# Patient Record
Sex: FEMALE | Race: WHITE | NOT HISPANIC OR LATINO | Employment: UNEMPLOYED | URBAN - METROPOLITAN AREA
[De-identification: names, ages, dates, MRNs, and addresses within clinical notes are randomized per-mention and may not be internally consistent; named-entity substitution may affect disease eponyms.]

---

## 2017-03-31 ENCOUNTER — HOSPITAL ENCOUNTER (OUTPATIENT)
Dept: FAMILY MEDICINE CLINIC | Facility: CLINIC | Age: 47
Setting detail: SPECIMEN
Discharge: HOME | End: 2017-03-31
Attending: FAMILY MEDICINE | Admitting: FAMILY MEDICINE

## 2017-03-31 LAB
ALBUMIN SERPL-MCNC: 3.9 G/DL (ref 3.5–4.8)
ALBUMIN/GLOB SERPL: 1.2 {RATIO} (ref 1–1.7)
ALP SERPL-CCNC: 55 IU/L (ref 32–91)
ALT SERPL-CCNC: 33 IU/L (ref 14–54)
ANION GAP SERPL CALC-SCNC: 14.3 MMOL/L (ref 10–20)
AST SERPL-CCNC: 25 IU/L (ref 15–41)
BILIRUB SERPL-MCNC: 0.7 MG/DL (ref 0.3–1.2)
BUN SERPL-MCNC: 11 MG/DL (ref 8–20)
BUN/CREAT SERPL: 15.7 (ref 5.4–26.2)
CALCIUM SERPL-MCNC: 9 MG/DL (ref 8.9–10.3)
CHLORIDE SERPL-SCNC: 104 MMOL/L (ref 101–111)
CHOLEST SERPL-MCNC: 216 MG/DL
CHOLEST/HDLC SERPL: 4 {RATIO}
CONV CO2: 25 MMOL/L (ref 22–32)
CONV LDL CHOLESTEROL DIRECT: 146 MG/DL (ref 0–100)
CONV TOTAL PROTEIN: 7.1 G/DL (ref 6.1–7.9)
CREAT UR-MCNC: 0.7 MG/DL (ref 0.4–1)
GLOBULIN UR ELPH-MCNC: 3.2 G/DL (ref 2.5–3.8)
GLUCOSE SERPL-MCNC: 94 MG/DL (ref 65–99)
HDLC SERPL-MCNC: 54 MG/DL
LDLC/HDLC SERPL: 2.7 {RATIO}
LIPID INTERPRETATION: ABNORMAL
POTASSIUM SERPL-SCNC: 4.3 MMOL/L (ref 3.6–5.1)
SODIUM SERPL-SCNC: 139 MMOL/L (ref 136–144)
TRIGL SERPL-MCNC: 85 MG/DL
VLDLC SERPL CALC-MCNC: 16.3 MG/DL

## 2018-10-07 ENCOUNTER — HOSPITAL ENCOUNTER (OUTPATIENT)
Dept: URGENT CARE | Facility: CLINIC | Age: 48
Setting detail: SPECIMEN
Discharge: HOME | End: 2018-10-07
Attending: FAMILY MEDICINE | Admitting: FAMILY MEDICINE

## 2018-10-07 LAB
BACTERIA SPEC AEROBE CULT: NORMAL
COLONY COUNT: NORMAL
Lab: NORMAL
MICRO REPORT STATUS: NORMAL
SPECIMEN SOURCE: NORMAL

## 2019-04-01 ENCOUNTER — HOSPITAL ENCOUNTER (OUTPATIENT)
Dept: FAMILY MEDICINE CLINIC | Facility: CLINIC | Age: 49
Setting detail: SPECIMEN
Discharge: HOME | End: 2019-04-01
Attending: FAMILY MEDICINE | Admitting: FAMILY MEDICINE

## 2019-04-01 LAB
ALBUMIN SERPL-MCNC: 3.9 G/DL (ref 3.5–4.8)
ALBUMIN/GLOB SERPL: 1.3 {RATIO} (ref 1–1.7)
ALP SERPL-CCNC: 69 IU/L (ref 32–91)
ALT SERPL-CCNC: 21 IU/L (ref 14–54)
ANION GAP SERPL CALC-SCNC: 14.8 MMOL/L (ref 10–20)
AST SERPL-CCNC: 24 IU/L (ref 15–41)
BASOPHILS # BLD AUTO: 0.1 10*3/UL (ref 0–0.2)
BASOPHILS NFR BLD AUTO: 1 % (ref 0–2)
BILIRUB SERPL-MCNC: 0.5 MG/DL (ref 0.3–1.2)
BUN SERPL-MCNC: 10 MG/DL (ref 8–20)
BUN/CREAT SERPL: 14.3 (ref 5.4–26.2)
CALCIUM SERPL-MCNC: 9.1 MG/DL (ref 8.9–10.3)
CHLORIDE SERPL-SCNC: 102 MMOL/L (ref 101–111)
CHOLEST SERPL-MCNC: 210 MG/DL
CHOLEST/HDLC SERPL: 4 {RATIO}
CONV CO2: 26 MMOL/L (ref 22–32)
CONV LDL CHOLESTEROL DIRECT: 146 MG/DL (ref 0–100)
CONV TOTAL PROTEIN: 6.8 G/DL (ref 6.1–7.9)
CREAT UR-MCNC: 0.7 MG/DL (ref 0.4–1)
DIFFERENTIAL METHOD BLD: (no result)
EOSINOPHIL # BLD AUTO: 0.3 10*3/UL (ref 0–0.3)
EOSINOPHIL # BLD AUTO: 5 % (ref 0–3)
ERYTHROCYTE [DISTWIDTH] IN BLOOD BY AUTOMATED COUNT: 15.2 % (ref 11.5–14.5)
GLOBULIN UR ELPH-MCNC: 2.9 G/DL (ref 2.5–3.8)
GLUCOSE SERPL-MCNC: 101 MG/DL (ref 65–99)
HCT VFR BLD AUTO: 36.3 % (ref 35–49)
HDLC SERPL-MCNC: 52 MG/DL
HGB BLD-MCNC: 11.8 G/DL (ref 12–15)
LDLC/HDLC SERPL: 2.8 {RATIO}
LIPID INTERPRETATION: ABNORMAL
LYMPHOCYTES # BLD AUTO: 1.5 10*3/UL (ref 0.8–4.8)
LYMPHOCYTES NFR BLD AUTO: 27 % (ref 18–42)
MCH RBC QN AUTO: 27.3 PG (ref 26–32)
MCHC RBC AUTO-ENTMCNC: 32.4 G/DL (ref 32–36)
MCV RBC AUTO: 84.3 FL (ref 80–94)
MONOCYTES # BLD AUTO: 0.5 10*3/UL (ref 0.1–1.3)
MONOCYTES NFR BLD AUTO: 9 % (ref 2–11)
NEUTROPHILS # BLD AUTO: 3.2 10*3/UL (ref 2.3–8.6)
NEUTROPHILS NFR BLD AUTO: 58 % (ref 50–75)
NRBC BLD AUTO-RTO: 0 /100{WBCS}
NRBC/RBC NFR BLD MANUAL: 0 10*3/UL
PLATELET # BLD AUTO: 293 10*3/UL (ref 150–450)
PMV BLD AUTO: 9 FL (ref 7.4–10.4)
POTASSIUM SERPL-SCNC: 3.8 MMOL/L (ref 3.6–5.1)
RBC # BLD AUTO: 4.3 10*6/UL (ref 4–5.4)
SODIUM SERPL-SCNC: 139 MMOL/L (ref 136–144)
TRIGL SERPL-MCNC: 252 MG/DL
VLDLC SERPL CALC-MCNC: 12.1 MG/DL
WBC # BLD AUTO: 5.6 10*3/UL (ref 4.5–11.5)

## 2019-08-05 RX ORDER — LISINOPRIL 10 MG/1
TABLET ORAL
Qty: 30 TABLET | Refills: 0 | Status: SHIPPED | OUTPATIENT
Start: 2019-08-05 | End: 2019-08-31 | Stop reason: SDUPTHER

## 2019-09-01 RX ORDER — LISINOPRIL 10 MG/1
TABLET ORAL
Qty: 30 TABLET | Refills: 3 | Status: SHIPPED | OUTPATIENT
Start: 2019-09-01 | End: 2019-12-22

## 2019-12-22 RX ORDER — LISINOPRIL 10 MG/1
TABLET ORAL
Qty: 30 TABLET | Refills: 3 | Status: SHIPPED | OUTPATIENT
Start: 2019-12-22 | End: 2020-06-29 | Stop reason: SDUPTHER

## 2019-12-31 RX ORDER — METHOCARBAMOL 500 MG/1
500 TABLET, FILM COATED ORAL EVERY 24 HOURS
Qty: 30 TABLET | Refills: 0 | Status: SHIPPED | OUTPATIENT
Start: 2019-12-31 | End: 2020-09-16

## 2020-02-03 ENCOUNTER — LAB (OUTPATIENT)
Dept: LAB | Facility: HOSPITAL | Age: 50
End: 2020-02-03

## 2020-02-03 ENCOUNTER — OFFICE VISIT (OUTPATIENT)
Dept: CARDIOLOGY | Facility: CLINIC | Age: 50
End: 2020-02-03

## 2020-02-03 VITALS
WEIGHT: 176.75 LBS | HEIGHT: 63 IN | DIASTOLIC BLOOD PRESSURE: 89 MMHG | OXYGEN SATURATION: 100 % | BODY MASS INDEX: 31.32 KG/M2 | SYSTOLIC BLOOD PRESSURE: 129 MMHG | HEART RATE: 103 BPM

## 2020-02-03 DIAGNOSIS — I10 ESSENTIAL HYPERTENSION: ICD-10-CM

## 2020-02-03 DIAGNOSIS — R00.2 PALPITATIONS: ICD-10-CM

## 2020-02-03 DIAGNOSIS — E78.5 DYSLIPIDEMIA: ICD-10-CM

## 2020-02-03 DIAGNOSIS — R06.02 SOB (SHORTNESS OF BREATH): ICD-10-CM

## 2020-02-03 DIAGNOSIS — R00.2 PALPITATIONS: Primary | ICD-10-CM

## 2020-02-03 LAB
ALBUMIN SERPL-MCNC: 4.1 G/DL (ref 3.5–5.2)
ALBUMIN/GLOB SERPL: 1.4 G/DL
ALP SERPL-CCNC: 86 U/L (ref 39–117)
ALT SERPL W P-5'-P-CCNC: 22 U/L (ref 1–33)
ANION GAP SERPL CALCULATED.3IONS-SCNC: 12.4 MMOL/L (ref 5–15)
AST SERPL-CCNC: 24 U/L (ref 1–32)
BASOPHILS # BLD AUTO: 0.06 10*3/MM3 (ref 0–0.2)
BASOPHILS NFR BLD AUTO: 1 % (ref 0–1.5)
BILIRUB SERPL-MCNC: <0.2 MG/DL (ref 0.2–1.2)
BUN BLD-MCNC: 15 MG/DL (ref 6–20)
BUN/CREAT SERPL: 22.1 (ref 7–25)
CALCIUM SPEC-SCNC: 9.2 MG/DL (ref 8.6–10.5)
CHLORIDE SERPL-SCNC: 100 MMOL/L (ref 98–107)
CO2 SERPL-SCNC: 25.6 MMOL/L (ref 22–29)
CREAT BLD-MCNC: 0.68 MG/DL (ref 0.57–1)
DEPRECATED RDW RBC AUTO: 41.5 FL (ref 37–54)
EOSINOPHIL # BLD AUTO: 0.16 10*3/MM3 (ref 0–0.4)
EOSINOPHIL NFR BLD AUTO: 2.7 % (ref 0.3–6.2)
ERYTHROCYTE [DISTWIDTH] IN BLOOD BY AUTOMATED COUNT: 14 % (ref 12.3–15.4)
GFR SERPL CREATININE-BSD FRML MDRD: 92 ML/MIN/1.73
GLOBULIN UR ELPH-MCNC: 3 GM/DL
GLUCOSE BLD-MCNC: 110 MG/DL (ref 65–99)
HCT VFR BLD AUTO: 35.3 % (ref 34–46.6)
HGB BLD-MCNC: 11.1 G/DL (ref 12–15.9)
IMM GRANULOCYTES # BLD AUTO: 0.03 10*3/MM3 (ref 0–0.05)
IMM GRANULOCYTES NFR BLD AUTO: 0.5 % (ref 0–0.5)
LYMPHOCYTES # BLD AUTO: 1.83 10*3/MM3 (ref 0.7–3.1)
LYMPHOCYTES NFR BLD AUTO: 30.4 % (ref 19.6–45.3)
MAGNESIUM SERPL-MCNC: 2 MG/DL (ref 1.6–2.6)
MCH RBC QN AUTO: 25.6 PG (ref 26.6–33)
MCHC RBC AUTO-ENTMCNC: 31.4 G/DL (ref 31.5–35.7)
MCV RBC AUTO: 81.5 FL (ref 79–97)
MONOCYTES # BLD AUTO: 0.49 10*3/MM3 (ref 0.1–0.9)
MONOCYTES NFR BLD AUTO: 8.2 % (ref 5–12)
NEUTROPHILS # BLD AUTO: 3.44 10*3/MM3 (ref 1.7–7)
NEUTROPHILS NFR BLD AUTO: 57.2 % (ref 42.7–76)
NRBC BLD AUTO-RTO: 0 /100 WBC (ref 0–0.2)
PLATELET # BLD AUTO: 383 10*3/MM3 (ref 140–450)
PMV BLD AUTO: 10.7 FL (ref 6–12)
POTASSIUM BLD-SCNC: 4 MMOL/L (ref 3.5–5.2)
PROT SERPL-MCNC: 7.1 G/DL (ref 6–8.5)
RBC # BLD AUTO: 4.33 10*6/MM3 (ref 3.77–5.28)
SODIUM BLD-SCNC: 138 MMOL/L (ref 136–145)
TSH SERPL DL<=0.05 MIU/L-ACNC: 1.47 UIU/ML (ref 0.27–4.2)
WBC NRBC COR # BLD: 6.01 10*3/MM3 (ref 3.4–10.8)

## 2020-02-03 PROCEDURE — 36415 COLL VENOUS BLD VENIPUNCTURE: CPT

## 2020-02-03 PROCEDURE — 85025 COMPLETE CBC W/AUTO DIFF WBC: CPT

## 2020-02-03 PROCEDURE — 84443 ASSAY THYROID STIM HORMONE: CPT

## 2020-02-03 PROCEDURE — 93000 ELECTROCARDIOGRAM COMPLETE: CPT | Performed by: INTERNAL MEDICINE

## 2020-02-03 PROCEDURE — 83735 ASSAY OF MAGNESIUM: CPT

## 2020-02-03 PROCEDURE — 99204 OFFICE O/P NEW MOD 45 MIN: CPT | Performed by: INTERNAL MEDICINE

## 2020-02-03 PROCEDURE — 80053 COMPREHEN METABOLIC PANEL: CPT

## 2020-02-03 RX ORDER — OMEPRAZOLE 40 MG/1
CAPSULE, DELAYED RELEASE ORAL
COMMUNITY
Start: 2020-01-13 | End: 2021-08-11

## 2020-02-03 NOTE — PROGRESS NOTES
Encounter Date:02/03/2020  New patient      Patient ID: Itzel Hogan is a 49 y.o. female.    Chief Complaint:  Palpitations  Hypertension  Dyslipidemia  Shortness of breath      History of Present Illness  The patient is a pleasant 49-year-old white female he is here for evaluation of palpitations and shortness of breath.  Patient has history of hypertension and dyslipidemia.  She recently went to urgent care center on 12/30/2019 with an episode of hives.  Patient's blood pressure was elevated.  Since then patient has been having intermittent palpitations and having exertional shortness of breath.  Denies having any chest discomfort heaviness or tightness in the chest.  Denies having any jaw pain left arm pain.  Patient has family history of mitral valve replacement.  No other associated aggravating or alleviating factors.    Patient is not having any chest discomfort dizziness or syncope.  Denies having any headache ,abdominal pain ,nausea, vomiting , diarrhea constipation, loss of weight or loss of appetite.  Denies having any excessive bruising ,hematuria or blood in the stool.    Review of all systems negative except as indicated    Assessment and Plan     ]]]]]]]]]]]]]]]]]  Impression  ========  -Palpitations    -Exertional shortness of breath    -Hypertension dyslipidemia migraine headaches    -Status post tonsillectomy sinus surgery carpal tunnel release    -Family history of coronary artery disease.    -Non-smoker    -Allergic to penicillin cephalexin prochlorperazine  ===========  Plan  ========  Patient has palpitations and exertional shortness of breath.  EKG showed sinus rhythm with small inferior Q waves.  Patient has multiple risk factors.  Ischemic cardiac work-up.  Stress Cardiolite test  Echocardiogram  Patient to have lab work including comprehensive metabolic panel CBC magnesium level and TSH.  Patient may benefit from additional beta-blocker.  Follow-up in the office on the same day.  Further  plan will depend on patient's progress.  [[[[[[[[[[[[[[[[[           Diagnosis Plan   1. Palpitations  Comprehensive Metabolic Panel    CBC & Differential    Magnesium    TSH    Adult Transthoracic Echo Complete W/ Cont if Necessary Per Protocol    Stress Test With Myocardial Perfusion One Day    ECG 12 Lead   2. Essential hypertension  Comprehensive Metabolic Panel    CBC & Differential    Magnesium    TSH    Adult Transthoracic Echo Complete W/ Cont if Necessary Per Protocol    Stress Test With Myocardial Perfusion One Day    ECG 12 Lead   3. Dyslipidemia  Comprehensive Metabolic Panel    CBC & Differential    Magnesium    TSH    Adult Transthoracic Echo Complete W/ Cont if Necessary Per Protocol    Stress Test With Myocardial Perfusion One Day    ECG 12 Lead   4. SOB (shortness of breath)  Comprehensive Metabolic Panel    CBC & Differential    Magnesium    TSH    Adult Transthoracic Echo Complete W/ Cont if Necessary Per Protocol    Stress Test With Myocardial Perfusion One Day    ECG 12 Lead   LAB RESULTS (LAST 7 DAYS)    CBC        BMP        CMP         BNP        TROPONIN        CoAg        Creatinine Clearance  CrCl cannot be calculated (Patient's most recent lab result is older than the maximum 30 days allowed.).    ABG        Radiology  No radiology results for the last day                The following portions of the patient's history were reviewed and updated as appropriate: allergies, current medications, past family history, past medical history, past social history, past surgical history and problem list.    Review of Systems   Constitution: Negative for malaise/fatigue.   Cardiovascular: Positive for chest pain (with exertion). Negative for leg swelling, palpitations and syncope.   Respiratory: Positive for shortness of breath (with exertion).    Skin: Negative for rash.   Gastrointestinal: Negative for nausea and vomiting.   Neurological: Negative for dizziness, light-headedness and numbness.          Current Outpatient Medications:   •  azelastine (ASTELIN) 0.1 % nasal spray, 1 spray into the nostril(s) as directed by provider., Disp: , Rfl:   •  cetirizine (ZYRTEC ALLERGY) 10 MG tablet, Daily., Disp: , Rfl:   •  fluticasone (FLONASE ALLERGY RELIEF) 50 MCG/ACT nasal spray, FLONASE ALLERGY RELIEF 50 MCG/ACT SUSP, Disp: , Rfl:   •  lisinopril (PRINIVIL,ZESTRIL) 10 MG tablet, TAKE 1 TABLET BY MOUTH DAILY, Disp: 30 tablet, Rfl: 3  •  methocarbamol (ROBAXIN) 500 MG tablet, Take 1 tablet by mouth Daily. (Patient taking differently: Take 500 mg by mouth As Needed.), Disp: 30 tablet, Rfl: 0  •  montelukast (SINGULAIR) 10 MG tablet, Take 10 mg by mouth Daily., Disp: , Rfl:   •  Multiple Vitamins-Minerals (MULTIVITAMIN WITH MINERALS) tablet tablet, Take 1 tablet by mouth Daily., Disp: , Rfl:   •  omeprazole (priLOSEC) 40 MG capsule, TK 1 C PO QD, Disp: , Rfl:   •  predniSONE (DELTASONE) 10 MG (21) tablet pack, Take  by mouth Daily. Use as directed on package, Disp: 21 each, Rfl: 0    Allergies   Allergen Reactions   • Prochlorperazine Dizziness   • Cephalexin Hives   • Penicillins Hives       Family History   Problem Relation Age of Onset   • Stroke Mother    • Heart failure Mother    • Cancer Mother    • Skin cancer Father        Past Surgical History:   Procedure Laterality Date   • CARPAL TUNNEL RELEASE Bilateral    • SINUS SURGERY     • TONSILLECTOMY     • WISDOM TOOTH EXTRACTION         Past Medical History:   Diagnosis Date   • Allergies    • Hyperlipidemia    • Hypertension    • Migraine        Family History   Problem Relation Age of Onset   • Stroke Mother    • Heart failure Mother    • Cancer Mother    • Skin cancer Father        Social History     Socioeconomic History   • Marital status:      Spouse name: Not on file   • Number of children: Not on file   • Years of education: Not on file   • Highest education level: Not on file   Tobacco Use   • Smoking status: Never Smoker   • Smokeless  "tobacco: Never Used   Substance and Sexual Activity   • Alcohol use: Yes     Comment: occasionally           ECG 12 Lead  Date/Time: 2/3/2020 11:11 AM  Performed by: Oleg Flores MD  Authorized by: Oleg Flores MD   Previous ECG: no previous ECG available  Comments: Normal sinus rhythm nonspecific ST-T wave changes 90/min small inferior Q waves normal axis normal intervals no ectopy no previous tracing available for comparison              Objective:       Physical Exam    /89 (BP Location: Left arm, Patient Position: Sitting, Cuff Size: Adult)   Pulse 103   Ht 160 cm (63\")   Wt 80.2 kg (176 lb 12 oz)   LMP 01/05/2020   SpO2 100%   BMI 31.31 kg/m²   The patient is alert, oriented and in no distress.    Vital signs as noted above.    Head and neck revealed no carotid bruits or jugular venous distension.  No thyromegaly or lymphadenopathy is present.    Lungs clear.  No wheezing.  Breath sounds are normal bilaterally.    Heart normal first and second heart sounds.  No murmur..  No pericardial rub is present.  No gallop is present.    Abdomen soft and nontender.  No organomegaly is present.    Extremities revealed good peripheral pulses without any pedal edema.    Skin warm and dry.    Musculoskeletal system is grossly normal.    CNS grossly normal.        "

## 2020-02-11 ENCOUNTER — HOSPITAL ENCOUNTER (OUTPATIENT)
Dept: CARDIOLOGY | Facility: HOSPITAL | Age: 50
Discharge: HOME | End: 2020-02-11

## 2020-02-11 ENCOUNTER — HOSPITAL ENCOUNTER (OUTPATIENT)
Dept: CARDIOLOGY | Facility: HOSPITAL | Age: 50
Discharge: HOME | End: 2020-02-11
Admitting: INTERNAL MEDICINE

## 2020-02-11 ENCOUNTER — OFFICE VISIT (OUTPATIENT)
Dept: CARDIOLOGY | Facility: CLINIC | Age: 50
End: 2020-02-11

## 2020-02-11 VITALS
WEIGHT: 176 LBS | DIASTOLIC BLOOD PRESSURE: 62 MMHG | HEIGHT: 63 IN | SYSTOLIC BLOOD PRESSURE: 99 MMHG | BODY MASS INDEX: 31.18 KG/M2

## 2020-02-11 VITALS
DIASTOLIC BLOOD PRESSURE: 80 MMHG | WEIGHT: 176 LBS | HEART RATE: 89 BPM | HEIGHT: 63 IN | SYSTOLIC BLOOD PRESSURE: 130 MMHG | BODY MASS INDEX: 31.18 KG/M2

## 2020-02-11 DIAGNOSIS — R06.02 SOB (SHORTNESS OF BREATH): ICD-10-CM

## 2020-02-11 DIAGNOSIS — E78.5 DYSLIPIDEMIA: ICD-10-CM

## 2020-02-11 DIAGNOSIS — I10 ESSENTIAL HYPERTENSION: ICD-10-CM

## 2020-02-11 DIAGNOSIS — R00.2 PALPITATIONS: ICD-10-CM

## 2020-02-11 DIAGNOSIS — I10 ESSENTIAL HYPERTENSION: Primary | ICD-10-CM

## 2020-02-11 LAB
BH CV ECHO MEAS - ACS: 2 CM
BH CV ECHO MEAS - AO MAX PG (FULL): 0.85 MMHG
BH CV ECHO MEAS - AO MAX PG: 5.7 MMHG
BH CV ECHO MEAS - AO MEAN PG (FULL): 1 MMHG
BH CV ECHO MEAS - AO MEAN PG: 3.6 MMHG
BH CV ECHO MEAS - AO ROOT AREA: 5.2 CM^2
BH CV ECHO MEAS - AO ROOT DIAM: 2.6 CM
BH CV ECHO MEAS - AO V2 MAX: 118.9 CM/SEC
BH CV ECHO MEAS - AO V2 MEAN: 92.1 CM/SEC
BH CV ECHO MEAS - AO V2 VTI: 26.1 CM
BH CV ECHO MEAS - ASC AORTA: 2.7 CM
BH CV ECHO MEAS - AVA(I,A): 2 CM^2
BH CV ECHO MEAS - AVA(I,D): 2 CM^2
BH CV ECHO MEAS - AVA(V,A): 2.1 CM^2
BH CV ECHO MEAS - AVA(V,D): 2.1 CM^2
BH CV ECHO MEAS - EDV(CUBED): 71.4 ML
BH CV ECHO MEAS - EDV(MOD-SP4): 77.6 ML
BH CV ECHO MEAS - EDV(TEICH): 76.3 ML
BH CV ECHO MEAS - EF(CUBED): 70.6 %
BH CV ECHO MEAS - EF(MOD-SP4): 69.7 %
BH CV ECHO MEAS - EF(TEICH): 62.7 %
BH CV ECHO MEAS - ESV(CUBED): 21 ML
BH CV ECHO MEAS - ESV(MOD-SP4): 23.5 ML
BH CV ECHO MEAS - ESV(TEICH): 28.5 ML
BH CV ECHO MEAS - FS: 33.5 %
BH CV ECHO MEAS - IVS/LVPW: 0.75
BH CV ECHO MEAS - IVSD: 0.8 CM
BH CV ECHO MEAS - LA DIMENSION: 3.9 CM
BH CV ECHO MEAS - LA/AO: 1.5
BH CV ECHO MEAS - LV MASS(C)D: 123.3 GRAMS
BH CV ECHO MEAS - LV MAX PG: 4.8 MMHG
BH CV ECHO MEAS - LV MEAN PG: 2.6 MMHG
BH CV ECHO MEAS - LV V1 MAX: 109.6 CM/SEC
BH CV ECHO MEAS - LV V1 MEAN: 77.1 CM/SEC
BH CV ECHO MEAS - LV V1 VTI: 22.2 CM
BH CV ECHO MEAS - LVIDD: 4.1 CM
BH CV ECHO MEAS - LVIDS: 2.8 CM
BH CV ECHO MEAS - LVOT AREA: 2.3 CM^2
BH CV ECHO MEAS - LVOT DIAM: 1.7 CM
BH CV ECHO MEAS - LVPWD: 1.1 CM
BH CV ECHO MEAS - MV A MAX VEL: 63.2 CM/SEC
BH CV ECHO MEAS - MV DEC SLOPE: 303.5 CM/SEC^2
BH CV ECHO MEAS - MV DEC TIME: 0.2 SEC
BH CV ECHO MEAS - MV E MAX VEL: 61.6 CM/SEC
BH CV ECHO MEAS - MV E/A: 0.97
BH CV ECHO MEAS - MV MAX PG: 3.8 MMHG
BH CV ECHO MEAS - MV MEAN PG: 1.9 MMHG
BH CV ECHO MEAS - MV V2 MAX: 97.7 CM/SEC
BH CV ECHO MEAS - MV V2 MEAN: 67 CM/SEC
BH CV ECHO MEAS - MV V2 VTI: 12.7 CM
BH CV ECHO MEAS - MVA(VTI): 4.1 CM^2
BH CV ECHO MEAS - PA ACC TIME: 0.15 SEC
BH CV ECHO MEAS - PA PR(ACCEL): 9.4 MMHG
BH CV ECHO MEAS - PULM DIAS VEL: 43.8 CM/SEC
BH CV ECHO MEAS - PULM S/D: 1.3
BH CV ECHO MEAS - PULM SYS VEL: 58.4 CM/SEC
BH CV ECHO MEAS - RAP SYSTOLE: 3 MMHG
BH CV ECHO MEAS - RV MAX PG: 3.6 MMHG
BH CV ECHO MEAS - RV MEAN PG: 1.9 MMHG
BH CV ECHO MEAS - RV V1 MAX: 95.3 CM/SEC
BH CV ECHO MEAS - RV V1 MEAN: 64.2 CM/SEC
BH CV ECHO MEAS - RV V1 VTI: 19.4 CM
BH CV ECHO MEAS - RVDD: 1.8 CM
BH CV ECHO MEAS - RVSP: 20.9 MMHG
BH CV ECHO MEAS - SV(AO): 135.3 ML
BH CV ECHO MEAS - SV(CUBED): 50.4 ML
BH CV ECHO MEAS - SV(LVOT): 51.7 ML
BH CV ECHO MEAS - SV(MOD-SP4): 54.1 ML
BH CV ECHO MEAS - SV(TEICH): 47.8 ML
BH CV ECHO MEAS - TR MAX VEL: 211.3 CM/SEC
BH CV STRESS BP STAGE 1: NORMAL
BH CV STRESS BP STAGE 2: NORMAL
BH CV STRESS DURATION MIN STAGE 1: 3
BH CV STRESS DURATION MIN STAGE 2: 3
BH CV STRESS DURATION SEC STAGE 1: 0
BH CV STRESS DURATION SEC STAGE 2: 0
BH CV STRESS GRADE STAGE 1: 10
BH CV STRESS GRADE STAGE 2: 12
BH CV STRESS HR STAGE 1: 130
BH CV STRESS HR STAGE 2: 144
BH CV STRESS METS STAGE 1: 5
BH CV STRESS METS STAGE 2: 7.5
BH CV STRESS PROTOCOL 1: NORMAL
BH CV STRESS RECOVERY BP: NORMAL MMHG
BH CV STRESS RECOVERY HR: 120 BPM
BH CV STRESS SPEED STAGE 1: 1.7
BH CV STRESS SPEED STAGE 2: 2.5
BH CV STRESS STAGE 1: 1
BH CV STRESS STAGE 2: 2
LV EF 2D ECHO EST: 60 %
MAXIMAL PREDICTED HEART RATE: 172 BPM
MAXIMAL PREDICTED HEART RATE: 172 BPM
PERCENT MAX PREDICTED HR: 90.7 %
STRESS BASELINE BP: NORMAL MMHG
STRESS BASELINE HR: 87 BPM
STRESS PERCENT HR: 107 %
STRESS POST EXERCISE DUR MIN: 8 MIN
STRESS POST EXERCISE DUR SEC: 50 SEC
STRESS POST PEAK HR: 156 BPM
STRESS TARGET HR: 146 BPM
STRESS TARGET HR: 146 BPM

## 2020-02-11 PROCEDURE — 93018 CV STRESS TEST I&R ONLY: CPT | Performed by: INTERNAL MEDICINE

## 2020-02-11 PROCEDURE — 0 TECHNETIUM SESTAMIBI: Performed by: INTERNAL MEDICINE

## 2020-02-11 PROCEDURE — A9500 TC99M SESTAMIBI: HCPCS | Performed by: INTERNAL MEDICINE

## 2020-02-11 PROCEDURE — 99213 OFFICE O/P EST LOW 20 MIN: CPT | Performed by: INTERNAL MEDICINE

## 2020-02-11 PROCEDURE — 78452 HT MUSCLE IMAGE SPECT MULT: CPT | Performed by: INTERNAL MEDICINE

## 2020-02-11 PROCEDURE — 93016 CV STRESS TEST SUPVJ ONLY: CPT | Performed by: INTERNAL MEDICINE

## 2020-02-11 PROCEDURE — 78452 HT MUSCLE IMAGE SPECT MULT: CPT

## 2020-02-11 PROCEDURE — 93017 CV STRESS TEST TRACING ONLY: CPT

## 2020-02-11 PROCEDURE — 93306 TTE W/DOPPLER COMPLETE: CPT

## 2020-02-11 PROCEDURE — 93306 TTE W/DOPPLER COMPLETE: CPT | Performed by: INTERNAL MEDICINE

## 2020-02-11 RX ADMIN — TECHNETIUM TC 99M SESTAMIBI 1 DOSE: 1 INJECTION INTRAVENOUS at 13:30

## 2020-02-11 RX ADMIN — TECHNETIUM TC 99M SESTAMIBI 1 DOSE: 1 INJECTION INTRAVENOUS at 15:15

## 2020-02-11 NOTE — PROGRESS NOTES
Encounter Date:02/11/2020  Last seen 2/3/2020      Patient ID: Itzel Hogan is a 49 y.o. female.    Chief Complaint:    Palpitations  Hypertension  Dyslipidemia  Shortness of breath        History of Present Illness  The patient is a pleasant 49-year-old white female he is here for evaluation of palpitations and shortness of breath.  Patient has history of hypertension and dyslipidemia.  She recently went to urgent care center on 12/30/2019 with an episode of hives.  Patient's blood pressure was elevated.  Since then patient has been having intermittent palpitations and having exertional shortness of breath.  Denies having any chest discomfort heaviness or tightness in the chest.  Denies having any jaw pain left arm pain.  Patient has family history of mitral valve replacement.  No other associated aggravating or alleviating factors.     Patient is not having any chest discomfort dizziness or syncope.  Denies having any headache ,abdominal pain ,nausea, vomiting , diarrhea constipation, loss of weight or loss of appetite.  Denies having any excessive bruising ,hematuria or blood in the stool.     Review of all systems negative except as indicated     Assessment and Plan      ]]]]]]]]]]]]]]]]]  Impression  ========  -Palpitations     -Exertional shortness of breath  Echocardiogram-normal 2/11/2020  Stress Cardiolite test-normal 2/11/2020     -Hypertension dyslipidemia migraine headaches     -Status post tonsillectomy sinus surgery carpal tunnel release     -Family history of coronary artery disease.     -Non-smoker     -Allergic to penicillin cephalexin prochlorperazine  ===========  Plan  ========  Patient has palpitations and exertional shortness of breath.  EKG showed sinus rhythm with small inferior Q waves.  Patient has multiple risk factors.  Ischemic cardiac work-up.  Stress Cardiolite test-normal as above  Echocardiogram-normal as above  Patient to have lab work including comprehensive metabolic panel CBC  magnesium level and TSH.-  Reviewed and normal  Patient may benefit from additional beta-blocker.  Follow-up in the office in 6 weeks.  Consider cardiac catheterization if patient has continued symptoms  Further plan will depend on patient's progress.  [[[[[[[[[[[[[[[[[           Diagnosis Plan   1. Essential hypertension     2. Palpitations     3. Dyslipidemia     LAB RESULTS (LAST 7 DAYS)    CBC        BMP        CMP         BNP        TROPONIN        CoAg        Creatinine Clearance  Estimated Creatinine Clearance: 101.3 mL/min (by C-G formula based on SCr of 0.68 mg/dL).    ABG        Radiology  No radiology results for the last day                The following portions of the patient's history were reviewed and updated as appropriate: allergies, current medications, past family history, past medical history, past social history, past surgical history and problem list.    Review of Systems   Constitution: Negative for fever and malaise/fatigue.   Cardiovascular: Negative for chest pain, dyspnea on exertion and palpitations.   Respiratory: Negative for cough and shortness of breath.    Skin: Negative for rash.   Gastrointestinal: Negative for abdominal pain, nausea and vomiting.   Neurological: Negative for focal weakness and headaches.   All other systems reviewed and are negative.        Current Outpatient Medications:   •  azelastine (ASTELIN) 0.1 % nasal spray, 1 spray into the nostril(s) as directed by provider., Disp: , Rfl:   •  cetirizine (ZYRTEC ALLERGY) 10 MG tablet, Daily., Disp: , Rfl:   •  fluticasone (FLONASE ALLERGY RELIEF) 50 MCG/ACT nasal spray, FLONASE ALLERGY RELIEF 50 MCG/ACT SUSP, Disp: , Rfl:   •  lisinopril (PRINIVIL,ZESTRIL) 10 MG tablet, TAKE 1 TABLET BY MOUTH DAILY, Disp: 30 tablet, Rfl: 3  •  methocarbamol (ROBAXIN) 500 MG tablet, Take 1 tablet by mouth Daily. (Patient taking differently: Take 500 mg by mouth As Needed.), Disp: 30 tablet, Rfl: 0  •  montelukast (SINGULAIR) 10 MG tablet,  "Take 10 mg by mouth Daily., Disp: , Rfl:   •  Multiple Vitamins-Minerals (MULTIVITAMIN WITH MINERALS) tablet tablet, Take 1 tablet by mouth Daily., Disp: , Rfl:   •  omeprazole (priLOSEC) 40 MG capsule, TK 1 C PO QD, Disp: , Rfl:   •  predniSONE (DELTASONE) 10 MG (21) tablet pack, Take  by mouth Daily. Use as directed on package, Disp: 21 each, Rfl: 0  No current facility-administered medications for this visit.     Allergies   Allergen Reactions   • Prochlorperazine Dizziness   • Cephalexin Hives   • Penicillins Hives       Family History   Problem Relation Age of Onset   • Stroke Mother    • Heart failure Mother    • Cancer Mother    • Skin cancer Father        Past Surgical History:   Procedure Laterality Date   • CARPAL TUNNEL RELEASE Bilateral    • SINUS SURGERY     • TONSILLECTOMY     • WISDOM TOOTH EXTRACTION         Past Medical History:   Diagnosis Date   • Allergies    • Hyperlipidemia    • Hypertension    • Migraine        Family History   Problem Relation Age of Onset   • Stroke Mother    • Heart failure Mother    • Cancer Mother    • Skin cancer Father        Social History     Socioeconomic History   • Marital status:      Spouse name: Not on file   • Number of children: Not on file   • Years of education: Not on file   • Highest education level: Not on file   Tobacco Use   • Smoking status: Never Smoker   • Smokeless tobacco: Never Used   Substance and Sexual Activity   • Alcohol use: Yes     Comment: occasionally         Procedures      Objective:       Physical Exam    /80   Pulse 89   Ht 160 cm (63\")   Wt 79.8 kg (176 lb)   BMI 31.18 kg/m²   The patient is alert, oriented and in no distress.    Vital signs as noted above.    Head and neck revealed no carotid bruits or jugular venous distension.  No thyromegaly or lymphadenopathy is present.    Lungs clear.  No wheezing.  Breath sounds are normal bilaterally.    Heart normal first and second heart sounds.  No murmur..  No " pericardial rub is present.  No gallop is present.    Abdomen soft and nontender.  No organomegaly is present.    Extremities revealed good peripheral pulses without any pedal edema.    Skin warm and dry.    Musculoskeletal system is grossly normal.    CNS grossly normal.

## 2020-04-29 RX ORDER — MONTELUKAST SODIUM 10 MG/1
TABLET ORAL
Qty: 90 TABLET | Refills: 0 | Status: SHIPPED | OUTPATIENT
Start: 2020-04-29 | End: 2020-07-24

## 2020-06-29 ENCOUNTER — OFFICE VISIT (OUTPATIENT)
Dept: CARDIOLOGY | Facility: CLINIC | Age: 50
End: 2020-06-29

## 2020-06-29 VITALS
BODY MASS INDEX: 30.65 KG/M2 | HEART RATE: 86 BPM | HEIGHT: 63 IN | OXYGEN SATURATION: 100 % | DIASTOLIC BLOOD PRESSURE: 83 MMHG | WEIGHT: 173 LBS | SYSTOLIC BLOOD PRESSURE: 120 MMHG

## 2020-06-29 DIAGNOSIS — R06.02 SOB (SHORTNESS OF BREATH): ICD-10-CM

## 2020-06-29 DIAGNOSIS — I10 ESSENTIAL HYPERTENSION: Primary | ICD-10-CM

## 2020-06-29 DIAGNOSIS — E78.5 DYSLIPIDEMIA: ICD-10-CM

## 2020-06-29 DIAGNOSIS — R00.2 PALPITATIONS: ICD-10-CM

## 2020-06-29 PROCEDURE — 93000 ELECTROCARDIOGRAM COMPLETE: CPT | Performed by: INTERNAL MEDICINE

## 2020-06-29 PROCEDURE — 99213 OFFICE O/P EST LOW 20 MIN: CPT | Performed by: INTERNAL MEDICINE

## 2020-06-29 RX ORDER — LISINOPRIL 10 MG/1
10 TABLET ORAL DAILY
Qty: 30 TABLET | Refills: 3 | Status: SHIPPED | OUTPATIENT
Start: 2020-06-29 | End: 2022-03-08 | Stop reason: SDUPTHER

## 2020-06-29 NOTE — PROGRESS NOTES
Encounter Date:06/29/2020  Last seen 2/11/2020      Patient ID: Itzel Hogan is a 49 y.o. female.    Chief Complaint:  Palpitations  Hypertension  Dyslipidemia  Shortness of breath        History of Present Illness  Since I have last seen, the patient has been without any chest discomfort ,shortness of breath, palpitations, dizziness or syncope.  Denies having any headache ,abdominal pain ,nausea, vomiting , diarrhea constipation, loss of weight or loss of appetite.  Denies having any excessive bruising ,hematuria or blood in the stool.    Review of all systems negative except as indicated    Assessment and Plan      ]]]]]]]]]]]]]]]]]  Impression  ========  -Palpitations-better     -Exertional shortness of breath-better    Echocardiogram-normal 2/11/2020  Stress Cardiolite test-normal 2/11/2020     -Hypertension dyslipidemia migraine headaches recent cholesterol 233  (March 2020)     -Status post tonsillectomy sinus surgery carpal tunnel release     -Family history of coronary artery disease.     -Non-smoker     -Allergic to penicillin cephalexin prochlorperazine  ===========  Plan  ========  Palpitations and shortness of breath better.  Patient is not having any angina pectoris or congestive heart failure  Recent labs were reviewed.  Cholesterol 233 .  Low-cholesterol low-fat diet.  Follow-up with primary care regarding elevated cholesterol.  EKG showed sinus rhythm with small inferior Q waves.  Recent lab work including comprehensive metabolic panel CBC magnesium level and TSH.-  Reviewed and normal  Follow-up in the office in 6 months.  Further plan will depend on patient's progress.  [[[[[[[[[[[[[[[[[              Diagnosis Plan   1. Essential hypertension     2. Palpitations     3. Dyslipidemia     4. SOB (shortness of breath)     LAB RESULTS (LAST 7 DAYS)    CBC        BMP        CMP         BNP        TROPONIN        CoAg        Creatinine Clearance  CrCl cannot be calculated (Patient's most  recent lab result is older than the maximum 30 days allowed.).    ABG        Radiology  No radiology results for the last day                The following portions of the patient's history were reviewed and updated as appropriate: allergies, current medications, past family history, past medical history, past social history, past surgical history and problem list.    Review of Systems   Constitution: Negative for malaise/fatigue.   Cardiovascular: Negative for chest pain, leg swelling, palpitations and syncope.   Respiratory: Negative for shortness of breath.    Skin: Negative for rash.   Gastrointestinal: Negative for nausea and vomiting.   Neurological: Negative for dizziness, light-headedness and numbness.         Current Outpatient Medications:   •  azelastine (ASTELIN) 0.1 % nasal spray, 1 spray into the nostril(s) as directed by provider., Disp: , Rfl:   •  cetirizine (ZYRTEC ALLERGY) 10 MG tablet, Daily., Disp: , Rfl:   •  fluticasone (FLONASE ALLERGY RELIEF) 50 MCG/ACT nasal spray, FLONASE ALLERGY RELIEF 50 MCG/ACT SUSP, Disp: , Rfl:   •  lisinopril (PRINIVIL,ZESTRIL) 10 MG tablet, Take 1 tablet by mouth Daily., Disp: 30 tablet, Rfl: 3  •  methocarbamol (ROBAXIN) 500 MG tablet, Take 1 tablet by mouth Daily. (Patient taking differently: Take 500 mg by mouth As Needed.), Disp: 30 tablet, Rfl: 0  •  montelukast (SINGULAIR) 10 MG tablet, TAKE 1 TABLET BY MOUTH EVERY DAY, Disp: 90 tablet, Rfl: 0  •  Multiple Vitamins-Minerals (MULTIVITAMIN WITH MINERALS) tablet tablet, Take 1 tablet by mouth Daily., Disp: , Rfl:   •  omeprazole (priLOSEC) 40 MG capsule, TK 1 C PO QD, Disp: , Rfl:   •  predniSONE (DELTASONE) 10 MG (21) tablet pack, Take  by mouth Daily. Use as directed on package, Disp: 21 each, Rfl: 0    Allergies   Allergen Reactions   • Prochlorperazine Dizziness   • Cephalexin Hives   • Penicillins Hives       Family History   Problem Relation Age of Onset   • Stroke Mother    • Heart failure Mother    •  "Cancer Mother    • Skin cancer Father        Past Surgical History:   Procedure Laterality Date   • CARPAL TUNNEL RELEASE Bilateral    • SINUS SURGERY     • TONSILLECTOMY     • WISDOM TOOTH EXTRACTION         Past Medical History:   Diagnosis Date   • Allergies    • Hyperlipidemia    • Hypertension    • Migraine        Family History   Problem Relation Age of Onset   • Stroke Mother    • Heart failure Mother    • Cancer Mother    • Skin cancer Father        Social History     Socioeconomic History   • Marital status:      Spouse name: Not on file   • Number of children: Not on file   • Years of education: Not on file   • Highest education level: Not on file   Tobacco Use   • Smoking status: Never Smoker   • Smokeless tobacco: Never Used   Substance and Sexual Activity   • Alcohol use: Yes     Comment: occasionally           ECG 12 Lead  Date/Time: 6/29/2020 1:51 PM  Performed by: Oleg Flores MD  Authorized by: Oleg Flores MD   Comparison: compared with previous ECG   Similar to previous ECG  Comments: Normal sinus rhythm nonspecific ST-T wave changes 88/min normal axis normal intervals no ectopy no change from previous EKG              Objective:       Physical Exam    /83 (BP Location: Left arm, Patient Position: Sitting, Cuff Size: Large Adult)   Pulse 86   Ht 160 cm (63\")   Wt 78.5 kg (173 lb)   SpO2 100%   BMI 30.65 kg/m²   The patient is alert, oriented and in no distress.    Vital signs as noted above.    Head and neck revealed no carotid bruits or jugular venous distension.  No thyromegaly or lymphadenopathy is present.    Lungs clear.  No wheezing.  Breath sounds are normal bilaterally.    Heart normal first and second heart sounds.  No murmur..  No pericardial rub is present.  No gallop is present.    Abdomen soft and nontender.  No organomegaly is present.    Extremities revealed good peripheral pulses without any pedal edema.    Skin warm and dry.    Musculoskeletal system is " grossly normal.    CNS grossly normal.

## 2020-07-24 RX ORDER — MONTELUKAST SODIUM 10 MG/1
TABLET ORAL
Qty: 90 TABLET | Refills: 0 | Status: SHIPPED | OUTPATIENT
Start: 2020-07-24 | End: 2020-09-16

## 2020-09-14 ENCOUNTER — HOSPITAL ENCOUNTER (EMERGENCY)
Facility: HOSPITAL | Age: 50
Discharge: HOME | End: 2020-09-14
Admitting: EMERGENCY MEDICINE

## 2020-09-14 VITALS
DIASTOLIC BLOOD PRESSURE: 73 MMHG | RESPIRATION RATE: 14 BRPM | HEART RATE: 92 BPM | TEMPERATURE: 98.2 F | OXYGEN SATURATION: 100 % | HEIGHT: 63 IN | BODY MASS INDEX: 30.78 KG/M2 | SYSTOLIC BLOOD PRESSURE: 123 MMHG | WEIGHT: 173.72 LBS

## 2020-09-14 DIAGNOSIS — R51.9 ACUTE INTRACTABLE HEADACHE, UNSPECIFIED HEADACHE TYPE: Primary | ICD-10-CM

## 2020-09-14 PROCEDURE — 25010000002 KETOROLAC TROMETHAMINE PER 15 MG: Performed by: PHYSICIAN ASSISTANT

## 2020-09-14 PROCEDURE — 96372 THER/PROPH/DIAG INJ SC/IM: CPT

## 2020-09-14 PROCEDURE — 99283 EMERGENCY DEPT VISIT LOW MDM: CPT

## 2020-09-14 PROCEDURE — 96375 TX/PRO/DX INJ NEW DRUG ADDON: CPT

## 2020-09-14 PROCEDURE — 25010000002 DIPHENHYDRAMINE PER 50 MG: Performed by: PHYSICIAN ASSISTANT

## 2020-09-14 PROCEDURE — 96374 THER/PROPH/DIAG INJ IV PUSH: CPT

## 2020-09-14 PROCEDURE — 25010000002 ONDANSETRON PER 1 MG: Performed by: PHYSICIAN ASSISTANT

## 2020-09-14 PROCEDURE — 25010000002 MORPHINE PER 10 MG: Performed by: EMERGENCY MEDICINE

## 2020-09-14 RX ORDER — SODIUM CHLORIDE 0.9 % (FLUSH) 0.9 %
10 SYRINGE (ML) INJECTION AS NEEDED
Status: DISCONTINUED | OUTPATIENT
Start: 2020-09-14 | End: 2020-09-14 | Stop reason: HOSPADM

## 2020-09-14 RX ORDER — MORPHINE SULFATE 4 MG/ML
2 INJECTION, SOLUTION INTRAMUSCULAR; INTRAVENOUS ONCE
Status: COMPLETED | OUTPATIENT
Start: 2020-09-14 | End: 2020-09-14

## 2020-09-14 RX ORDER — DIPHENHYDRAMINE HYDROCHLORIDE 50 MG/ML
25 INJECTION INTRAMUSCULAR; INTRAVENOUS ONCE
Status: COMPLETED | OUTPATIENT
Start: 2020-09-14 | End: 2020-09-14

## 2020-09-14 RX ORDER — PROCHLORPERAZINE EDISYLATE 5 MG/ML
10 INJECTION INTRAMUSCULAR; INTRAVENOUS ONCE
Status: DISCONTINUED | OUTPATIENT
Start: 2020-09-14 | End: 2020-09-14

## 2020-09-14 RX ORDER — KETOROLAC TROMETHAMINE 15 MG/ML
15 INJECTION, SOLUTION INTRAMUSCULAR; INTRAVENOUS ONCE
Status: COMPLETED | OUTPATIENT
Start: 2020-09-14 | End: 2020-09-14

## 2020-09-14 RX ORDER — SUMATRIPTAN 6 MG/.5ML
6 INJECTION, SOLUTION SUBCUTANEOUS ONCE
Status: COMPLETED | OUTPATIENT
Start: 2020-09-14 | End: 2020-09-14

## 2020-09-14 RX ORDER — ONDANSETRON 2 MG/ML
4 INJECTION INTRAMUSCULAR; INTRAVENOUS ONCE
Status: COMPLETED | OUTPATIENT
Start: 2020-09-14 | End: 2020-09-14

## 2020-09-14 RX ADMIN — SUMATRIPTAN 6 MG: 6 INJECTION SUBCUTANEOUS at 09:54

## 2020-09-14 RX ADMIN — MORPHINE SULFATE 2 MG: 4 INJECTION INTRAVENOUS at 11:03

## 2020-09-14 RX ADMIN — SODIUM CHLORIDE 500 ML: 900 INJECTION, SOLUTION INTRAVENOUS at 09:54

## 2020-09-14 RX ADMIN — DIPHENHYDRAMINE HYDROCHLORIDE 25 MG: 50 INJECTION, SOLUTION INTRAMUSCULAR; INTRAVENOUS at 09:54

## 2020-09-14 RX ADMIN — KETOROLAC TROMETHAMINE 15 MG: 15 INJECTION, SOLUTION INTRAMUSCULAR; INTRAVENOUS at 09:53

## 2020-09-14 RX ADMIN — ONDANSETRON 4 MG: 2 INJECTION INTRAMUSCULAR; INTRAVENOUS at 09:53

## 2020-09-14 NOTE — ED PROVIDER NOTES
Subjective   History: Patient is a 49-year-old female presents to the ER with a headache since Tuesday.  She reports she has a history of migraines this feels like a typical migraine but she has not been able to shake it.  She is been taking ibuprofen and Tylenol without significant relief.  She reports it is on the left side of her head and her left eye her left scalp she reports is worse when she bends over.  She had some associated blurry vision and flashes in her left eye that comes and goes along with photophobia.  She does not currently have a PCP.  No sudden onset or thunderclap onset.  It is not the worst headache of her life.      Onset: 6 days  Location: Head  Duration: Constant  Character: Pressure  Aggravating/Alleviating factors: None  Radiation none  Severity: moderate            Review of Systems   Constitutional: Negative for chills, diaphoresis, fatigue and fever.   Eyes: Positive for visual disturbance.   Respiratory: Negative for cough, choking and shortness of breath.    Cardiovascular: Negative for chest pain and palpitations.   Gastrointestinal: Negative for abdominal pain, nausea and vomiting.   Genitourinary: Negative.    Musculoskeletal: Negative.    Skin: Negative.    Neurological: Positive for headaches.   Psychiatric/Behavioral: Negative.        Past Medical History:   Diagnosis Date   • Allergies    • Hyperlipidemia    • Hypertension    • Migraine        Allergies   Allergen Reactions   • Penicillins Hives   • Prochlorperazine Dizziness   • Stadol [Butorphanol] Dizziness   • Cephalexin Hives       Past Surgical History:   Procedure Laterality Date   • CARPAL TUNNEL RELEASE Bilateral    • SINUS SURGERY     • TONSILLECTOMY     • WISDOM TOOTH EXTRACTION         Family History   Problem Relation Age of Onset   • Stroke Mother    • Heart failure Mother    • Cancer Mother    • Skin cancer Father        Social History     Socioeconomic History   • Marital status:      Spouse name: Not on  file   • Number of children: Not on file   • Years of education: Not on file   • Highest education level: Not on file   Tobacco Use   • Smoking status: Never Smoker   • Smokeless tobacco: Never Used   Substance and Sexual Activity   • Alcohol use: Yes     Comment: occasionally           Objective   Physical Exam  Constitutional:       General: She is not in acute distress.     Appearance: Normal appearance. She is well-developed. She is toxic-appearing. She is not ill-appearing or diaphoretic.   HENT:      Head: Normocephalic and atraumatic.   Eyes:      Extraocular Movements: Extraocular movements intact.   Neck:      Musculoskeletal: Normal range of motion and neck supple. No muscular tenderness.   Cardiovascular:      Rate and Rhythm: Normal rate and regular rhythm.   Pulmonary:      Effort: Pulmonary effort is normal.      Breath sounds: Normal breath sounds.   Musculoskeletal: Normal range of motion.         General: No swelling or tenderness.   Skin:     General: Skin is warm and dry.   Neurological:      General: No focal deficit present.      Mental Status: She is alert and oriented to person, place, and time. Mental status is at baseline.      Sensory: No sensory deficit.      Motor: No weakness.   Psychiatric:         Mood and Affect: Mood normal.         Behavior: Behavior normal.         Thought Content: Thought content normal.         Judgment: Judgment normal.         Procedures           ED Course          No radiology results for the last day  Labs Reviewed - No data to display  Medications   sodium chloride 0.9 % flush 10 mL (has no administration in time range)   Morphine sulfate (PF) injection 2 mg (has no administration in time range)   sodium chloride 0.9 % bolus 500 mL (500 mL Intravenous New Bag 9/14/20 3796)   SUMAtriptan (IMITREX) injection 6 mg (6 mg Subcutaneous Given 9/14/20 2019)   ketorolac (TORADOL) injection 15 mg (15 mg Intravenous Given 9/14/20 9853)   diphenhydrAMINE (BENADRYL)  injection 25 mg (25 mg Intravenous Given 9/14/20 1566)   ondansetron (ZOFRAN) injection 4 mg (4 mg Intravenous Given 9/14/20 9504)                                       MDM  Number of Diagnoses or Management Options  Acute intractable headache, unspecified headache type:   Diagnosis management comments: I examined the patient using the appropriate personal protective equipment.      DISPOSITION:   Chart Review:  Comorbidity:  has a past medical history of Allergies, Hyperlipidemia, Hypertension, and Migraine.  Differentials:this list is not all inclusive and does not constitute the entirety of considered causes --> migraine intracranial abnormality temporal arteritis    Imaging: Was interpreted by physician and reviewed by myself:  No radiology results for the last day    Disposition/Treatment:    Patient is a 49-year-old female history of migraines who presents to the ER with a migraine for the last 6 days.  She is been taking over-the-counter medication without relief she received a migraine cocktail and somewhat dull pressure she received a dose of pain medication told to follow-up with PCP this week for further management for migraine medication.  No thunderclap onset not the worst headache of her life.  She was stable at time of discharge return precaution follow-up terms provided she was in agreement with plan    Patient Progress  Patient progress: stable      Final diagnoses:   Acute intractable headache, unspecified headache type            Dolorse Prado PA-C  09/14/20 1046

## 2020-09-14 NOTE — DISCHARGE INSTRUCTIONS
Return to the ER for any worsening symptoms follow-up with your primary care provider if you do not have PCP see the above referrals.

## 2020-09-14 NOTE — ED NOTES
C/O migraine headache that has been present x six days. History of migraines.       Jojo Herrera RN  09/14/20 0939

## 2020-09-16 ENCOUNTER — HOSPITAL ENCOUNTER (OUTPATIENT)
Dept: GENERAL RADIOLOGY | Facility: HOSPITAL | Age: 50
Discharge: HOME | End: 2020-09-16
Admitting: NURSE PRACTITIONER

## 2020-09-16 ENCOUNTER — OFFICE VISIT (OUTPATIENT)
Dept: FAMILY MEDICINE CLINIC | Facility: CLINIC | Age: 50
End: 2020-09-16

## 2020-09-16 VITALS
HEART RATE: 83 BPM | WEIGHT: 174 LBS | HEIGHT: 63 IN | TEMPERATURE: 98 F | DIASTOLIC BLOOD PRESSURE: 78 MMHG | OXYGEN SATURATION: 100 % | BODY MASS INDEX: 30.83 KG/M2 | SYSTOLIC BLOOD PRESSURE: 116 MMHG

## 2020-09-16 DIAGNOSIS — M50.30 DDD (DEGENERATIVE DISC DISEASE), CERVICAL: ICD-10-CM

## 2020-09-16 DIAGNOSIS — M50.30 DDD (DEGENERATIVE DISC DISEASE), CERVICAL: Primary | ICD-10-CM

## 2020-09-16 DIAGNOSIS — G44.209 TENSION-TYPE HEADACHE, NOT INTRACTABLE, UNSPECIFIED CHRONICITY PATTERN: ICD-10-CM

## 2020-09-16 PROCEDURE — 99213 OFFICE O/P EST LOW 20 MIN: CPT | Performed by: NURSE PRACTITIONER

## 2020-09-16 PROCEDURE — 72040 X-RAY EXAM NECK SPINE 2-3 VW: CPT

## 2020-09-16 RX ORDER — MELATONIN
1000 DAILY
COMMUNITY
End: 2022-09-09

## 2020-09-16 RX ORDER — ASCORBIC ACID 500 MG
500 TABLET ORAL DAILY
COMMUNITY
End: 2022-09-09

## 2020-09-16 RX ORDER — TIZANIDINE HYDROCHLORIDE 4 MG/1
4 CAPSULE, GELATIN COATED ORAL 3 TIMES DAILY PRN
Qty: 30 CAPSULE | Refills: 2 | Status: SHIPPED | OUTPATIENT
Start: 2020-09-16 | End: 2020-12-07 | Stop reason: ALTCHOICE

## 2020-09-16 NOTE — PROGRESS NOTES
Subjective   Itzel Hogan is a 49 y.o. female.       HPI   Pt. is here today for ER follow up from 2 days ago for headache and neck pain. She was given pain relievers which she says  dulled the headache but it has not completely gone away.    Has DDD in neck; has done PT in the past. Thinks because her neck and neck muscles are painful that this is triggering her headache.  She has been trying to do the stretches she learned at PT at home but they have not been helping.  She is concerned the DDD is worsened.  She denies any numbness or tingling.  Says she uses Robaxin at home but doesn't feel like it works.  She has taken otc pain relievers without relief.    She denies any CP; palpitations; SOA; dizziness; headache; trouble with vision.    The following portions of the patient's history were reviewed and updated as appropriate: allergies, current medications, past family history, past medical history, past social history, past surgical history and problem list.    Review of Systems   Constitutional: Negative for activity change, appetite change, chills, diaphoresis, fatigue, fever, unexpected weight gain and unexpected weight loss.   HENT: Positive for rhinorrhea and sinus pressure. Negative for congestion, ear discharge, ear pain, nosebleeds, postnasal drip, sneezing, sore throat, tinnitus and trouble swallowing.    Eyes: Negative for blurred vision, double vision, photophobia and visual disturbance.   Respiratory: Negative for cough, chest tightness, shortness of breath and wheezing.    Cardiovascular: Negative for chest pain, palpitations and leg swelling.   Gastrointestinal: Negative for abdominal distention, abdominal pain, constipation, diarrhea, nausea, vomiting and indigestion.   Musculoskeletal: Positive for myalgias and neck pain. Negative for arthralgias and neck stiffness.   Skin: Negative for rash and skin lesions.   Neurological: Positive for headache. Negative for dizziness, tremors, syncope,  weakness, light-headedness, memory problem and confusion.   Psychiatric/Behavioral: Negative for depressed mood. The patient is not nervous/anxious.        Objective   Physical Exam  Vitals signs reviewed.   Constitutional:       General: She is not in acute distress.     Appearance: Normal appearance.   HENT:      Head: Normocephalic and atraumatic.      Right Ear: Hearing, tympanic membrane, ear canal and external ear normal.      Left Ear: Hearing, tympanic membrane, ear canal and external ear normal.      Nose: Nose normal.      Right Sinus: No maxillary sinus tenderness or frontal sinus tenderness.      Left Sinus: No maxillary sinus tenderness or frontal sinus tenderness.      Mouth/Throat:      Lips: Pink.      Mouth: Mucous membranes are moist.      Tongue: No lesions.      Pharynx: Oropharynx is clear. No oropharyngeal exudate.   Eyes:      General:         Right eye: No discharge.         Left eye: No discharge.      Conjunctiva/sclera: Conjunctivae normal.   Neck:      Musculoskeletal: Normal range of motion and neck supple. Muscular tenderness present. No neck rigidity.      Vascular: No carotid bruit.   Cardiovascular:      Rate and Rhythm: Normal rate and regular rhythm.      Pulses: Normal pulses.      Heart sounds: Normal heart sounds. No murmur.   Pulmonary:      Effort: Pulmonary effort is normal. No respiratory distress.      Breath sounds: Normal breath sounds. No wheezing.   Chest:      Chest wall: No tenderness.   Abdominal:      General: Abdomen is flat. Bowel sounds are normal. There is no distension.      Tenderness: There is no abdominal tenderness.   Musculoskeletal:      Cervical back: She exhibits tenderness, bony tenderness and spasm. She exhibits normal range of motion, no swelling, no edema and no deformity.   Lymphadenopathy:      Cervical: No cervical adenopathy.   Skin:     General: Skin is warm and dry.      Capillary Refill: Capillary refill takes less than 2 seconds.    Neurological:      General: No focal deficit present.      Mental Status: She is alert and oriented to person, place, and time.      Cranial Nerves: No cranial nerve deficit.   Psychiatric:         Mood and Affect: Mood normal.           Assessment/Plan   Itzel was seen today for hospital follow up visit, migraine, neck pain and sinusitis.    Diagnoses and all orders for this visit:    DDD (degenerative disc disease), cervical  Comments:  Will get updated xray of c-spine.   Tizanidine given PRN.   Heat/ice and rest; gentle stretches.   Orders:  -     XR Spine Cervical 2 or 3 View; Future  -     TiZANidine (ZANAFLEX) 4 MG capsule; Take 1 capsule by mouth 3 (Three) Times a Day As Needed for Muscle Spasms.    Tension-type headache, not intractable, unspecified chronicity pattern  Comments:  Will get updated xray of c-spine.   Tizanidine given PRN.   Can use Tylenol or Ibuprofen as needed.   Heat/ice and rest; gentle stretches.   Orders:  -     TiZANidine (ZANAFLEX) 4 MG capsule; Take 1 capsule by mouth 3 (Three) Times a Day As Needed for Muscle Spasms.

## 2020-12-07 ENCOUNTER — OFFICE VISIT (OUTPATIENT)
Dept: NEUROSURGERY | Facility: CLINIC | Age: 50
End: 2020-12-07

## 2020-12-07 VITALS
WEIGHT: 172 LBS | HEART RATE: 103 BPM | SYSTOLIC BLOOD PRESSURE: 127 MMHG | BODY MASS INDEX: 30.48 KG/M2 | DIASTOLIC BLOOD PRESSURE: 89 MMHG | HEIGHT: 63 IN

## 2020-12-07 DIAGNOSIS — M54.2 PAIN OF CERVICAL FACET JOINT: Primary | ICD-10-CM

## 2020-12-07 DIAGNOSIS — M50.30 DDD (DEGENERATIVE DISC DISEASE), CERVICAL: ICD-10-CM

## 2020-12-07 PROCEDURE — 96372 THER/PROPH/DIAG INJ SC/IM: CPT | Performed by: NURSE PRACTITIONER

## 2020-12-07 PROCEDURE — 99214 OFFICE O/P EST MOD 30 MIN: CPT | Performed by: NURSE PRACTITIONER

## 2020-12-07 RX ORDER — MELOXICAM 15 MG/1
15 TABLET ORAL NIGHTLY
Qty: 60 TABLET | Refills: 1 | Status: SHIPPED | OUTPATIENT
Start: 2020-12-07 | End: 2021-01-25

## 2020-12-07 RX ORDER — FERROUS SULFATE 325(65) MG
1 TABLET ORAL 2 TIMES DAILY
COMMUNITY
Start: 2020-10-14 | End: 2022-09-09

## 2020-12-07 RX ORDER — METHYLPREDNISOLONE ACETATE 80 MG/ML
80 INJECTION, SUSPENSION INTRA-ARTICULAR; INTRALESIONAL; INTRAMUSCULAR; SOFT TISSUE ONCE
Status: COMPLETED | OUTPATIENT
Start: 2020-12-07 | End: 2020-12-07

## 2020-12-07 RX ORDER — METHYLPREDNISOLONE 4 MG/1
TABLET ORAL
Qty: 1 EACH | Refills: 0 | Status: SHIPPED | OUTPATIENT
Start: 2020-12-07 | End: 2021-01-25

## 2020-12-07 RX ORDER — CYCLOBENZAPRINE HCL 10 MG
10 TABLET ORAL NIGHTLY
Qty: 60 TABLET | Refills: 0 | Status: SHIPPED | OUTPATIENT
Start: 2020-12-07 | End: 2022-03-08

## 2020-12-07 RX ORDER — ALBUTEROL SULFATE 90 UG/1
AEROSOL, METERED RESPIRATORY (INHALATION)
COMMUNITY
Start: 2020-11-13 | End: 2021-01-25

## 2020-12-07 RX ADMIN — METHYLPREDNISOLONE ACETATE 80 MG: 80 INJECTION, SUSPENSION INTRA-ARTICULAR; INTRALESIONAL; INTRAMUSCULAR; SOFT TISSUE at 11:27

## 2020-12-07 NOTE — PROGRESS NOTES
Subjective   History of Present Illness: Itzel Hogan is a 49 y.o. female been seen in the office today for neck pain.  Patient is new to clinic.  She states that she has been experiencing progressive cervical pain down her neck and upper thoracic area.  She also states she has a history of some lower lumbar pain as well.  Patient states that the pain in her neck and shoulder area is located on both sides of her neck.  She states that this pain is worsened upon increased activity when she works around the house.  She characterizes the pain as cramping muscle pain along the sides of her neck and down across her shoulders.  Also states that she did some tightening up the sides of her neck into her head causing headaches at times.  Patient describes no radiculopathy pain down her arms or numbness or tingling.  He denies any bowel/bladder dysfunction, leg weakness, arm pain, arm weakness, balance issues, dizziness.              Neck Pain   This is a chronic problem. The current episode started more than 1 year ago. The problem occurs constantly. The problem has been gradually worsening. The pain is associated with nothing. The pain is present in the left side and right side. The quality of the pain is described as aching. The pain is moderate. The symptoms are aggravated by stress. The pain is worse during the day. Associated symptoms include headaches. Pertinent negatives include no leg pain, numbness, pain with swallowing, photophobia, tingling, trouble swallowing or weakness. She has tried acetaminophen, muscle relaxants and NSAIDs for the symptoms. The treatment provided mild relief.       The following portions of the patient's history were reviewed and updated as appropriate: allergies, current medications, past family history, past medical history, past social history, past surgical history and problem list.    Review of Systems   HENT: Negative for trouble swallowing.    Eyes: Negative for photophobia.  "  Cardiovascular: Negative for leg swelling.   Genitourinary: Negative for difficulty urinating.   Musculoskeletal: Positive for back pain, neck pain and neck stiffness. Negative for gait problem.   Neurological: Positive for headaches. Negative for dizziness, tingling, weakness and numbness.   All other systems reviewed and are negative.      Objective     ./89 (BP Location: Left arm, Patient Position: Sitting, Cuff Size: Adult)   Pulse 103   Ht 160 cm (63\")   Wt 78 kg (172 lb)   BMI 30.47 kg/m²    Body mass index is 30.47 kg/m².      Physical Exam  Neurological:      Mental Status: She is oriented to person, place, and time.      Coordination: Finger-Nose-Finger Test and Romberg Test normal.      Gait: Gait is intact.      Deep Tendon Reflexes: Strength normal.   Psychiatric:         Speech: Speech normal.       Neurologic Exam     Mental Status   Oriented to person, place, and time.   Speech: speech is normal   Level of consciousness: alert    Cranial Nerves     CN XI   Right sternocleidomastoid strength: normal  Left sternocleidomastoid strength: normal  Right trapezius strength: normal  Left trapezius strength: normal    Motor Exam   Muscle bulk: normal  Overall muscle tone: normal    Strength   Strength 5/5 throughout.     Sensory Exam   Light touch normal.     Gait, Coordination, and Reflexes     Gait  Gait: normal    Coordination   Romberg: negative  Finger to nose coordination: normal    Reflexes   Reflexes 2+ except as noted.   Right plantar: normal  Left plantar: normal  Right Singh: absent  Left Singh: absent  Right ankle clonus: absent  Left ankle clonus: absent    Cervical spine  +/-  palpable masses, trachea midline, no lesions, no deformity  positive TTP midline, paraspinal   Decreased ROM     50 degrees right rotation   60 left rotation  Negative Spurling sign  Negative Lhermitte's sign  Muscle tightness along trapezius bilaterally  Muscle tightness paraspinal cervical  Thoracic  + TTP "  paraspinal  - deformity    Lumbar   + paraspinal  - deformity, masses, lesions  Normal ROM,     Assessment/Plan   Independent Review of Radiographic Studies:      I personally reviewed the images from the following studies.    Cervical x-rays 9/16/2020    1.  Multilevel DDD greatest at the C6-C7  2.  Grade 1 anterior listhesis of C7 on T1 of approximately 3 mm felt stable from previous imaging.    3.  Multilevel severe facet arthropathy    Medical Decision Making:    Patient is felt to be suffering from facet mediated pain along her cervical spine and into her thoracic spine.  She has no radiculopathy.  Does have severe facet arthropathy.  Upon examination, patient had tenderness bilaterally along the sides of her spine and muscles were very tight.  We will start her on a course of Flexeril, Mobic and sent her for physical therapy.  Patient wants all conservative measures completed.  She will also be given a Depo-Medrol injection today as well as on started on course of methyl prednisone Dosepak to reduce inflammation and pain.        Diagnoses and all orders for this visit:    1. Pain of cervical facet joint (Primary)  -     Ambulatory Referral to Physical Therapy Evaluate and treat; Soft Tissue Mobilizaton; Stretching, ROM, Strengthening  -     methylPREDNISolone acetate (DEPO-medrol) injection 80 mg    2. DDD (degenerative disc disease), cervical  -     Ambulatory Referral to Physical Therapy Evaluate and treat; Soft Tissue Mobilizaton; Stretching, ROM, Strengthening  -     methylPREDNISolone acetate (DEPO-medrol) injection 80 mg    Other orders  -     methylPREDNISolone (MEDROL) 4 MG dose pack; Take as directed on package instructions.  Dispense: 1 each; Refill: 0  -     meloxicam (MOBIC) 15 MG tablet; Take 1 tablet by mouth Every Night.  Dispense: 60 tablet; Refill: 1  -     cyclobenzaprine (FLEXERIL) 10 MG tablet; Take 1 tablet by mouth Every Night.  Dispense: 60 tablet; Refill: 0      Return in about 6 weeks  (around 1/18/2021).    This patient was examined wearing appropriate personal protective equipment.        Denea Church, HAVEN  12/07/20  08:41 EST

## 2021-01-12 ENCOUNTER — OFFICE VISIT (OUTPATIENT)
Dept: CARDIOLOGY | Facility: CLINIC | Age: 51
End: 2021-01-12

## 2021-01-12 VITALS
HEIGHT: 63 IN | BODY MASS INDEX: 30.39 KG/M2 | DIASTOLIC BLOOD PRESSURE: 82 MMHG | WEIGHT: 171.5 LBS | SYSTOLIC BLOOD PRESSURE: 119 MMHG | OXYGEN SATURATION: 99 % | TEMPERATURE: 97.8 F | HEART RATE: 78 BPM

## 2021-01-12 DIAGNOSIS — R06.02 SOB (SHORTNESS OF BREATH): Primary | ICD-10-CM

## 2021-01-12 DIAGNOSIS — I10 ESSENTIAL HYPERTENSION: ICD-10-CM

## 2021-01-12 DIAGNOSIS — R00.2 PALPITATIONS: ICD-10-CM

## 2021-01-12 DIAGNOSIS — E78.5 DYSLIPIDEMIA: ICD-10-CM

## 2021-01-12 PROCEDURE — 99214 OFFICE O/P EST MOD 30 MIN: CPT | Performed by: INTERNAL MEDICINE

## 2021-01-12 RX ORDER — VITAMIN B COMPLEX
CAPSULE ORAL DAILY
COMMUNITY
End: 2022-09-09

## 2021-01-12 NOTE — PROGRESS NOTES
Encounter Date:01/12/2021  Last seen 6/29/2020      Patient ID: Itzel Hogan is a 50 y.o. female.    Chief Complaint:    Palpitations  Hypertension  Dyslipidemia  Shortness of breath        History of Present Illness  Since I have last seen, the patient has been without any chest discomfort ,shortness of breath, palpitations, dizziness or syncope.  Denies having any headache ,abdominal pain ,nausea, vomiting , diarrhea constipation, loss of weight or loss of appetite.  Denies having any excessive bruising ,hematuria or blood in the stool.    Review of all systems negative except as indicated.    Reviewed ROS.  Assessment and Plan      ]]]]]]]]]]]]]]]]]  Impression  ========  -Palpitations-better     -Exertional shortness of breath-better     Echocardiogram-normal 2/11/2020  Stress Cardiolite test-normal 2/11/2020     -Hypertension dyslipidemia migraine headaches recent cholesterol 233  (March 2020)     -Status post tonsillectomy sinus surgery carpal tunnel release     -Family history of coronary artery disease.     -Non-smoker     -Allergic to penicillin cephalexin prochlorperazine  ===========  Plan  ========  Palpitations-better.    Shortness of breath better.    Hypertension-well-controlled-119/82  Continue present medications.    Dyslipidemia-diet controlled  Patient is not having any angina pectoris or congestive heart failure  Recent labs were reviewed.  Cholesterol 233 .  Low-cholesterol low-fat diet.  Recent lab work including comprehensive metabolic panel CBC magnesium level and TSH.-  Reviewed and normal    Follow-up in the office in 6 months.    Further plan will depend on patient's progress.  [[[[[[[[[[[[[[[[[             Diagnosis Plan   1. SOB (shortness of breath)     2. Palpitations     3. Essential hypertension     4. Dyslipidemia     LAB RESULTS (LAST 7 DAYS)    CBC        BMP        CMP         BNP        TROPONIN        CoAg        Creatinine Clearance  CrCl cannot be calculated  (Patient's most recent lab result is older than the maximum 30 days allowed.).    ABG        Radiology  No radiology results for the last day                The following portions of the patient's history were reviewed and updated as appropriate: allergies, current medications, past family history, past medical history, past social history, past surgical history and problem list.    Review of Systems   Constitution: Negative for malaise/fatigue.   Cardiovascular: Negative for chest pain, leg swelling, palpitations and syncope.   Respiratory: Negative for shortness of breath.    Skin: Negative for rash.   Gastrointestinal: Negative for nausea and vomiting.   Neurological: Negative for dizziness, light-headedness and numbness.         Current Outpatient Medications:   •  albuterol sulfate  (90 Base) MCG/ACT inhaler, , Disp: , Rfl:   •  azelastine (ASTELIN) 0.1 % nasal spray, 1 spray into the nostril(s) as directed by provider., Disp: , Rfl:   •  B Complex Vitamins (vitamin b complex) capsule capsule, Take  by mouth Daily., Disp: , Rfl:   •  cetirizine (ZYRTEC ALLERGY) 10 MG tablet, Daily., Disp: , Rfl:   •  cholecalciferol (VITAMIN D3) 25 MCG (1000 UT) tablet, Take 1,000 Units by mouth Daily., Disp: , Rfl:   •  cyclobenzaprine (FLEXERIL) 10 MG tablet, Take 1 tablet by mouth Every Night., Disp: 60 tablet, Rfl: 0  •  ferrous sulfate 325 (65 FE) MG tablet, Take 1 tablet by mouth 2 (Two) Times a Day., Disp: , Rfl:   •  fluticasone (FLONASE ALLERGY RELIEF) 50 MCG/ACT nasal spray, FLONASE ALLERGY RELIEF 50 MCG/ACT SUSP, Disp: , Rfl:   •  lisinopril (PRINIVIL,ZESTRIL) 10 MG tablet, Take 1 tablet by mouth Daily., Disp: 30 tablet, Rfl: 3  •  meloxicam (MOBIC) 15 MG tablet, Take 1 tablet by mouth Every Night., Disp: 60 tablet, Rfl: 1  •  methylPREDNISolone (MEDROL) 4 MG dose pack, Take as directed on package instructions., Disp: 1 each, Rfl: 0  •  omeprazole (priLOSEC) 40 MG capsule, TK 1 C PO QD, Disp: , Rfl:   •   "vitamin C (ASCORBIC ACID) 500 MG tablet, Take 500 mg by mouth Daily., Disp: , Rfl:   •  Zinc 50 MG capsule, Take  by mouth., Disp: , Rfl:     Allergies   Allergen Reactions   • Penicillins Hives   • Prochlorperazine Dizziness   • Stadol [Butorphanol] Dizziness   • Cephalexin Hives       Family History   Problem Relation Age of Onset   • Stroke Mother    • Heart failure Mother    • Cancer Mother    • Skin cancer Father        Past Surgical History:   Procedure Laterality Date   • CARPAL TUNNEL RELEASE Bilateral    • SINUS SURGERY     • TONSILLECTOMY     • WISDOM TOOTH EXTRACTION         Past Medical History:   Diagnosis Date   • Allergies    • Anemia    • B12 deficiency    • Hyperlipidemia    • Hypertension    • Migraine        Family History   Problem Relation Age of Onset   • Stroke Mother    • Heart failure Mother    • Cancer Mother    • Skin cancer Father        Social History     Socioeconomic History   • Marital status:      Spouse name: Not on file   • Number of children: Not on file   • Years of education: Not on file   • Highest education level: Not on file   Tobacco Use   • Smoking status: Never Smoker   • Smokeless tobacco: Never Used   Substance and Sexual Activity   • Alcohol use: Yes     Comment: occasionally   • Drug use: Never   • Sexual activity: Defer         Procedures      Objective:       Physical Exam    /82   Pulse 78   Temp 97.8 °F (36.6 °C)   Ht 160 cm (63\")   Wt 77.8 kg (171 lb 8 oz)   SpO2 99%   BMI 30.38 kg/m²   The patient is alert, oriented and in no distress.    Vital signs as noted above.    Head and neck revealed no carotid bruits or jugular venous distension.  No thyromegaly or lymphadenopathy is present.    Lungs clear.  No wheezing.  Breath sounds are normal bilaterally.    Heart normal first and second heart sounds.  No murmur..  No pericardial rub is present.  No gallop is present.    Abdomen soft and nontender.  No organomegaly is present.    Extremities " revealed good peripheral pulses without any pedal edema.    Skin warm and dry.    Musculoskeletal system is grossly normal.    CNS grossly normal.    Reviewed and unchanged from last visit.

## 2021-01-25 ENCOUNTER — OFFICE VISIT (OUTPATIENT)
Dept: FAMILY MEDICINE CLINIC | Facility: CLINIC | Age: 51
End: 2021-01-25

## 2021-01-25 VITALS
BODY MASS INDEX: 31.54 KG/M2 | HEIGHT: 63 IN | OXYGEN SATURATION: 98 % | DIASTOLIC BLOOD PRESSURE: 88 MMHG | WEIGHT: 178 LBS | SYSTOLIC BLOOD PRESSURE: 136 MMHG | HEART RATE: 94 BPM

## 2021-01-25 DIAGNOSIS — Z12.11 SCREENING FOR COLON CANCER: ICD-10-CM

## 2021-01-25 DIAGNOSIS — K21.9 GASTROESOPHAGEAL REFLUX DISEASE, UNSPECIFIED WHETHER ESOPHAGITIS PRESENT: Primary | ICD-10-CM

## 2021-01-25 PROCEDURE — 99213 OFFICE O/P EST LOW 20 MIN: CPT | Performed by: NURSE PRACTITIONER

## 2021-01-25 NOTE — PATIENT INSTRUCTIONS
Food Choices for Gastroesophageal Reflux Disease, Adult  When you have gastroesophageal reflux disease (GERD), the foods you eat and your eating habits are very important. Choosing the right foods can help ease your discomfort. Think about working with a nutrition specialist (dietitian) to help you make good choices.  What are tips for following this plan?    Meals  · Choose healthy foods that are low in fat, such as fruits, vegetables, whole grains, low-fat dairy products, and lean meat, fish, and poultry.  · Eat small meals often instead of 3 large meals a day. Eat your meals slowly, and in a place where you are relaxed. Avoid bending over or lying down until 2-3 hours after eating.  · Avoid eating meals 2-3 hours before bed.  · Avoid drinking a lot of liquid with meals.  · Cook foods using methods other than frying. Bake, grill, or broil food instead.  · Avoid or limit:  ? Chocolate.  ? Peppermint or spearmint.  ? Alcohol.  ? Pepper.  ? Black and decaffeinated coffee.  ? Black and decaffeinated tea.  ? Bubbly (carbonated) soft drinks.  ? Caffeinated energy drinks and soft drinks.  · Limit high-fat foods such as:  ? Fatty meat or fried foods.  ? Whole milk, cream, butter, or ice cream.  ? Nuts and nut butters.  ? Pastries, donuts, and sweets made with butter or shortening.  · Avoid foods that cause symptoms. These foods may be different for everyone. Common foods that cause symptoms include:  ? Tomatoes.  ? Oranges, merlene, and limes.  ? Peppers.  ? Spicy food.  ? Onions and garlic.  ? Vinegar.  Lifestyle  · Maintain a healthy weight. Ask your doctor what weight is healthy for you. If you need to lose weight, work with your doctor to do so safely.  · Exercise for at least 30 minutes for 5 or more days each week, or as told by your doctor.  · Wear loose-fitting clothes.  · Do not smoke. If you need help quitting, ask your doctor.  · Sleep with the head of your bed higher than your feet. Use a wedge under the  mattress or blocks under the bed frame to raise the head of the bed.  Summary  · When you have gastroesophageal reflux disease (GERD), food and lifestyle choices are very important in easing your symptoms.  · Eat small meals often instead of 3 large meals a day. Eat your meals slowly, and in a place where you are relaxed.  · Limit high-fat foods such as fatty meat or fried foods.  · Avoid bending over or lying down until 2-3 hours after eating.  · Avoid peppermint and spearmint, caffeine, alcohol, and chocolate.  This information is not intended to replace advice given to you by your health care provider. Make sure you discuss any questions you have with your health care provider.  Document Revised: 04/09/2020 Document Reviewed: 01/23/2018  Elsevier Patient Education © 2020 [employer]

## 2021-01-25 NOTE — PROGRESS NOTES
Subjective   Itzel Hogan is a 50 y.o. female.       HPI   Pt. Is here today to discuss acid reflux.  Currently on omeprazole 40 mg daily; has been on this for over a year; started by her GYN.  Recently had labs with GYN and found to have mild anemia; needs to start an iron supplement and it will interact with the omeprazole.  She has tried weaning  off it on her own but hasn't had much luck.  She doesn't monitor her diet for trigger foods.  She has tried otc meds without relief of reflux.  Her father had hx of a ulcer.  She denies any abdominal pain, N/V/D or constipation.  BM's are normal.  She had not had screening colonoscopy.      The following portions of the patient's history were reviewed and updated as appropriate: allergies, current medications, past family history, past medical history, past social history, past surgical history and problem list.    Review of Systems   Constitutional: Negative for activity change, appetite change, chills, diaphoresis, fatigue, fever, unexpected weight gain and unexpected weight loss.   Respiratory: Negative for cough, chest tightness, shortness of breath and wheezing.    Cardiovascular: Negative for chest pain, palpitations and leg swelling.   Gastrointestinal: Positive for GERD and indigestion. Negative for abdominal distention, abdominal pain, blood in stool, constipation, diarrhea, nausea and vomiting.   Genitourinary: Negative for decreased urine volume, difficulty urinating, dysuria, flank pain and frequency.   Musculoskeletal: Negative for arthralgias, back pain and myalgias.   Skin: Negative for rash.   Neurological: Negative for dizziness, weakness, light-headedness, headache and confusion.   Psychiatric/Behavioral: Negative for depressed mood. The patient is not nervous/anxious.        Objective   Physical Exam  Vitals signs reviewed.   Constitutional:       General: She is not in acute distress.     Appearance: Normal appearance.   Cardiovascular:      Rate  and Rhythm: Normal rate and regular rhythm.      Pulses: Normal pulses.      Heart sounds: Normal heart sounds. No murmur.   Pulmonary:      Effort: Pulmonary effort is normal. No respiratory distress.      Breath sounds: Normal breath sounds. No wheezing.   Chest:      Chest wall: No tenderness.   Abdominal:      General: Abdomen is flat. Bowel sounds are normal. There is no distension.      Palpations: Abdomen is soft.      Tenderness: There is no abdominal tenderness. There is no right CVA tenderness, left CVA tenderness, guarding or rebound.   Musculoskeletal:      Right lower leg: No edema.      Left lower leg: No edema.   Skin:     General: Skin is warm and dry.      Findings: No erythema.   Neurological:      General: No focal deficit present.      Mental Status: She is alert.   Psychiatric:         Mood and Affect: Mood normal.           Assessment/Plan   Diagnoses and all orders for this visit:    1. Gastroesophageal reflux disease, unspecified whether esophagitis present (Primary)  Comments:  Discussed how to wean from omeprazole over the next few months.   Referral to GI for EGD.   GERD friendly diet handout in AVS.   Orders:  -     Ambulatory referral for Screening EGD    2. Screening for colon cancer  Comments:  Referral for screening colonoscopy.   Orders:  -     Ambulatory Referral For Screening Colonoscopy

## 2021-01-27 ENCOUNTER — TELEPHONE (OUTPATIENT)
Dept: ORTHOPEDICS | Facility: OTHER | Age: 51
End: 2021-01-27

## 2021-02-08 ENCOUNTER — HOSPITAL ENCOUNTER (OUTPATIENT)
Facility: HOSPITAL | Age: 51
Setting detail: HOSPITAL OUTPATIENT SURGERY
End: 2021-02-08
Attending: INTERNAL MEDICINE | Admitting: INTERNAL MEDICINE

## 2021-03-25 RX ORDER — MELOXICAM 15 MG/1
15 TABLET ORAL DAILY
Qty: 90 TABLET | Refills: 0 | OUTPATIENT
Start: 2021-03-25

## 2021-03-25 NOTE — TELEPHONE ENCOUNTER
Refill request.  Last ov 12/7/2020, was to return in 6 weeks after physical therapy, had follow up scheduled here on 1/7/2021 but was a no show.  No physical therapy at this time that I can see

## 2021-04-07 ENCOUNTER — OFFICE (AMBULATORY)
Dept: URBAN - METROPOLITAN AREA PATHOLOGY 4 | Facility: PATHOLOGY | Age: 51
End: 2021-04-07
Payer: COMMERCIAL

## 2021-04-07 ENCOUNTER — OFFICE (AMBULATORY)
Dept: URBAN - METROPOLITAN AREA PATHOLOGY 4 | Facility: PATHOLOGY | Age: 51
End: 2021-04-07

## 2021-04-07 ENCOUNTER — ON CAMPUS - OUTPATIENT (AMBULATORY)
Dept: URBAN - METROPOLITAN AREA HOSPITAL 2 | Facility: HOSPITAL | Age: 51
End: 2021-04-07

## 2021-04-07 VITALS
DIASTOLIC BLOOD PRESSURE: 82 MMHG | HEART RATE: 85 BPM | RESPIRATION RATE: 14 BRPM | SYSTOLIC BLOOD PRESSURE: 175 MMHG | RESPIRATION RATE: 20 BRPM | HEART RATE: 82 BPM | OXYGEN SATURATION: 100 % | DIASTOLIC BLOOD PRESSURE: 92 MMHG | SYSTOLIC BLOOD PRESSURE: 158 MMHG | SYSTOLIC BLOOD PRESSURE: 117 MMHG | DIASTOLIC BLOOD PRESSURE: 69 MMHG | HEART RATE: 91 BPM | SYSTOLIC BLOOD PRESSURE: 154 MMHG | DIASTOLIC BLOOD PRESSURE: 87 MMHG | HEART RATE: 107 BPM | SYSTOLIC BLOOD PRESSURE: 126 MMHG | DIASTOLIC BLOOD PRESSURE: 103 MMHG | DIASTOLIC BLOOD PRESSURE: 80 MMHG | OXYGEN SATURATION: 98 % | SYSTOLIC BLOOD PRESSURE: 139 MMHG | HEART RATE: 97 BPM | OXYGEN SATURATION: 96 % | RESPIRATION RATE: 18 BRPM | DIASTOLIC BLOOD PRESSURE: 106 MMHG | RESPIRATION RATE: 16 BRPM | HEART RATE: 101 BPM | SYSTOLIC BLOOD PRESSURE: 138 MMHG | SYSTOLIC BLOOD PRESSURE: 152 MMHG | HEART RATE: 86 BPM | SYSTOLIC BLOOD PRESSURE: 124 MMHG | HEART RATE: 96 BPM | OXYGEN SATURATION: 99 % | SYSTOLIC BLOOD PRESSURE: 129 MMHG | DIASTOLIC BLOOD PRESSURE: 90 MMHG | HEART RATE: 104 BPM | TEMPERATURE: 97.8 F | DIASTOLIC BLOOD PRESSURE: 101 MMHG | DIASTOLIC BLOOD PRESSURE: 70 MMHG | RESPIRATION RATE: 12 BRPM | DIASTOLIC BLOOD PRESSURE: 83 MMHG | HEIGHT: 63 IN | HEART RATE: 99 BPM | WEIGHT: 175 LBS

## 2021-04-07 DIAGNOSIS — K62.1 RECTAL POLYP: ICD-10-CM

## 2021-04-07 DIAGNOSIS — R13.10 DYSPHAGIA, UNSPECIFIED: ICD-10-CM

## 2021-04-07 DIAGNOSIS — K64.1 SECOND DEGREE HEMORRHOIDS: ICD-10-CM

## 2021-04-07 DIAGNOSIS — D50.0 IRON DEFICIENCY ANEMIA SECONDARY TO BLOOD LOSS (CHRONIC): ICD-10-CM

## 2021-04-07 DIAGNOSIS — K21.00 GASTRO-ESOPHAGEAL REFLUX DISEASE WITH ESOPHAGITIS, WITHOUT B: ICD-10-CM

## 2021-04-07 PROBLEM — K20.80 OTHER ESOPHAGITIS WITHOUT BLEEDING: Status: ACTIVE | Noted: 2021-04-07

## 2021-04-07 LAB
GI HISTOLOGY: A. SELECT: (no result)
GI HISTOLOGY: B. UNSPECIFIED: (no result)
GI HISTOLOGY: PDF REPORT: (no result)

## 2021-04-07 PROCEDURE — 43450 DILATE ESOPHAGUS 1/MULT PASS: CPT | Performed by: INTERNAL MEDICINE

## 2021-04-07 PROCEDURE — 88305 TISSUE EXAM BY PATHOLOGIST: CPT | Mod: 26 | Performed by: INTERNAL MEDICINE

## 2021-04-07 PROCEDURE — 45380 COLONOSCOPY AND BIOPSY: CPT | Performed by: INTERNAL MEDICINE

## 2021-04-07 PROCEDURE — 43239 EGD BIOPSY SINGLE/MULTIPLE: CPT | Performed by: INTERNAL MEDICINE

## 2021-04-07 RX ORDER — OMEPRAZOLE 20 MG/1
TABLET, DELAYED RELEASE ORAL
Qty: 90 | Refills: 4 | Status: ACTIVE

## 2021-04-26 ENCOUNTER — TELEPHONE (OUTPATIENT)
Dept: FAMILY MEDICINE CLINIC | Facility: CLINIC | Age: 51
End: 2021-04-26

## 2021-04-26 NOTE — TELEPHONE ENCOUNTER
Caller: Itzel Hogan    Relationship: Self    Best call back number: [phone]    What medication are you requesting: ANTI ITCH    What are your current symptoms: POISON IVY COVERING ENTIRE BODY    How long have you been experiencing symptoms: SINCE THE WEEKEND    Have you had these symptoms before:    [x] Yes  [] No    Have you been treated for these symptoms before:   [x] Yes  [] No    If a prescription is needed, what is your preferred pharmacy and phone number: [id number] - [place], [place] - [address] [facility] [address] - [phone]  - [phone] FX     Additional notes:

## 2021-04-27 ENCOUNTER — OFFICE VISIT (OUTPATIENT)
Dept: FAMILY MEDICINE CLINIC | Facility: CLINIC | Age: 51
End: 2021-04-27

## 2021-04-27 VITALS
DIASTOLIC BLOOD PRESSURE: 83 MMHG | BODY MASS INDEX: 31.36 KG/M2 | HEIGHT: 63 IN | OXYGEN SATURATION: 99 % | HEART RATE: 76 BPM | WEIGHT: 177 LBS | TEMPERATURE: 98 F | SYSTOLIC BLOOD PRESSURE: 127 MMHG

## 2021-04-27 DIAGNOSIS — L23.7 POISON IVY DERMATITIS: Primary | ICD-10-CM

## 2021-04-27 PROCEDURE — 99213 OFFICE O/P EST LOW 20 MIN: CPT | Performed by: PHYSICIAN ASSISTANT

## 2021-04-27 PROCEDURE — 96372 THER/PROPH/DIAG INJ SC/IM: CPT | Performed by: PHYSICIAN ASSISTANT

## 2021-04-27 RX ORDER — METHYLPREDNISOLONE ACETATE 40 MG/ML
40 INJECTION, SUSPENSION INTRA-ARTICULAR; INTRALESIONAL; INTRAMUSCULAR; SOFT TISSUE ONCE
Status: COMPLETED | OUTPATIENT
Start: 2021-04-27 | End: 2021-04-27

## 2021-04-27 RX ORDER — OMEPRAZOLE 20 MG/1
CAPSULE, DELAYED RELEASE ORAL
COMMUNITY
Start: 2021-04-07 | End: 2022-06-20 | Stop reason: SDUPTHER

## 2021-04-27 RX ORDER — HYDROXYZINE HYDROCHLORIDE 25 MG/1
25 TABLET, FILM COATED ORAL 3 TIMES DAILY PRN
Qty: 30 TABLET | Refills: 2 | Status: SHIPPED | OUTPATIENT
Start: 2021-04-27 | End: 2022-03-08

## 2021-04-27 RX ORDER — PREDNISONE 20 MG/1
TABLET ORAL
Qty: 13 TABLET | Refills: 0 | Status: SHIPPED | OUTPATIENT
Start: 2021-04-27 | End: 2021-08-11

## 2021-04-27 RX ORDER — AZELASTINE 1 MG/ML
2 SPRAY, METERED NASAL 2 TIMES DAILY PRN
COMMUNITY
Start: 2021-03-25

## 2021-04-27 RX ADMIN — METHYLPREDNISOLONE ACETATE 40 MG: 40 INJECTION, SUSPENSION INTRA-ARTICULAR; INTRALESIONAL; INTRAMUSCULAR; SOFT TISSUE at 09:41

## 2021-04-27 NOTE — PROGRESS NOTES
Subjective   Itzel Hogan is a 50 y.o. female.     Pt presents with poison lizzeth. Pt has had multiple episodes of having poison ivy ever since buying her current piece of property.  She and her  were working on the land and both have poison ivy now.  She denies any chest pain, shortness of breath, troubles swallowing. No fevers.      Poison Lizzeth  Pertinent negatives include no fatigue, fever, shortness of breath or sore throat.        The following portions of the patient's history were reviewed and updated as appropriate: allergies, current medications, past family history, past medical history, past social history, past surgical history and problem list.  Past Medical History:   Diagnosis Date   • Allergies    • Anemia    • B12 deficiency    • Hyperlipidemia    • Hypertension    • Migraine      Past Surgical History:   Procedure Laterality Date   • CARPAL TUNNEL RELEASE Bilateral    • SINUS SURGERY     • TONSILLECTOMY     • WISDOM TOOTH EXTRACTION       Family History   Problem Relation Age of Onset   • Stroke Mother    • Heart failure Mother    • Cancer Mother    • Skin cancer Father      Social History     Socioeconomic History   • Marital status:      Spouse name: Not on file   • Number of children: Not on file   • Years of education: Not on file   • Highest education level: Not on file   Tobacco Use   • Smoking status: Never Smoker   • Smokeless tobacco: Never Used   Substance and Sexual Activity   • Alcohol use: Yes     Comment: occasionally   • Drug use: Never   • Sexual activity: Defer         Current Outpatient Medications:   •  azelastine (ASTELIN) 0.1 % nasal spray, , Disp: , Rfl:   •  B Complex Vitamins (vitamin b complex) capsule capsule, Take  by mouth Daily., Disp: , Rfl:   •  cetirizine (ZYRTEC ALLERGY) 10 MG tablet, Daily., Disp: , Rfl:   •  cholecalciferol (VITAMIN D3) 25 MCG (1000 UT) tablet, Take 1,000 Units by mouth Daily., Disp: , Rfl:   •  cyclobenzaprine (FLEXERIL) 10 MG  "tablet, Take 1 tablet by mouth Every Night., Disp: 60 tablet, Rfl: 0  •  ferrous sulfate 325 (65 FE) MG tablet, Take 1 tablet by mouth 2 (Two) Times a Day., Disp: , Rfl:   •  fluticasone (FLONASE ALLERGY RELIEF) 50 MCG/ACT nasal spray, FLONASE ALLERGY RELIEF 50 MCG/ACT SUSP, Disp: , Rfl:   •  lisinopril (PRINIVIL,ZESTRIL) 10 MG tablet, Take 1 tablet by mouth Daily., Disp: 30 tablet, Rfl: 3  •  omeprazole (priLOSEC) 20 MG capsule, TAKE 1 CAPSULE BY MOUTH EVERY DAY AS DIRECTED, Disp: , Rfl:   •  vitamin C (ASCORBIC ACID) 500 MG tablet, Take 500 mg by mouth Daily., Disp: , Rfl:   •  Zinc 50 MG capsule, Take  by mouth., Disp: , Rfl:   •  omeprazole (priLOSEC) 40 MG capsule, TK 1 C PO QD, Disp: , Rfl:     Review of Systems   Constitutional: Negative for activity change, appetite change, chills, diaphoresis, fatigue, fever, unexpected weight gain and unexpected weight loss.   HENT: Negative for sore throat and trouble swallowing.    Respiratory: Negative for chest tightness, shortness of breath and wheezing.    Cardiovascular: Negative for chest pain and palpitations.   Skin: Positive for rash.   Neurological: Negative for headache.     /83 (BP Location: Left arm, Patient Position: Sitting, Cuff Size: Adult)   Pulse 76   Temp 98 °F (36.7 °C) (Temporal)   Ht 160 cm (63\")   Wt 80.3 kg (177 lb)   SpO2 99%   BMI 31.35 kg/m²       Objective   Physical Exam  Vitals and nursing note reviewed.   Constitutional:       Appearance: Normal appearance.   Cardiovascular:      Rate and Rhythm: Normal rate and regular rhythm.      Heart sounds: Normal heart sounds.   Pulmonary:      Effort: Pulmonary effort is normal.      Breath sounds: Normal breath sounds.   Skin:     Findings: Rash present.   Neurological:      Mental Status: She is alert.   Psychiatric:         Mood and Affect: Mood normal.         Behavior: Behavior normal.         Procedures     Assessment/Plan   Diagnoses and all orders for this visit:    1. Poison " ivy dermatitis (Primary)  -     hydrOXYzine (ATARAX) 25 MG tablet; Take 1 tablet by mouth 3 (Three) Times a Day As Needed for Itching.  Dispense: 30 tablet; Refill: 2  -     methylPREDNISolone acetate (DEPO-medrol) injection 40 mg  -     predniSONE (DELTASONE) 20 MG tablet; Take 2 tabs once daily x 4 days, then take 1 tab once daily x 4 days, then take 1/2 tab once daily x 2 days  Dispense: 13 tablet; Refill: 0        Pt given depo-medrol 40mg IM injection in office today.  She can start hydroxyzine now but will wait to start oral steroid until 48 hours after injection.  She will watch for any signs of secondary infection.  She has had issues with the poison ivy being very difficult to get to resolve so she will let me know if further steroid is needed once finished with current dose.

## 2021-07-22 ENCOUNTER — OFFICE VISIT (OUTPATIENT)
Dept: CARDIOLOGY | Facility: CLINIC | Age: 51
End: 2021-07-22

## 2021-07-22 VITALS
BODY MASS INDEX: 31.18 KG/M2 | HEART RATE: 83 BPM | DIASTOLIC BLOOD PRESSURE: 79 MMHG | OXYGEN SATURATION: 100 % | WEIGHT: 176 LBS | SYSTOLIC BLOOD PRESSURE: 112 MMHG | HEIGHT: 63 IN

## 2021-07-22 DIAGNOSIS — I10 ESSENTIAL HYPERTENSION: ICD-10-CM

## 2021-07-22 DIAGNOSIS — R00.2 PALPITATIONS: ICD-10-CM

## 2021-07-22 DIAGNOSIS — E78.5 DYSLIPIDEMIA: ICD-10-CM

## 2021-07-22 DIAGNOSIS — R06.02 SOB (SHORTNESS OF BREATH): Primary | ICD-10-CM

## 2021-07-22 PROCEDURE — 99214 OFFICE O/P EST MOD 30 MIN: CPT | Performed by: INTERNAL MEDICINE

## 2021-07-22 PROCEDURE — 93000 ELECTROCARDIOGRAM COMPLETE: CPT | Performed by: INTERNAL MEDICINE

## 2021-07-22 NOTE — PROGRESS NOTES
Encounter Date:07/22/2021  Last seen 1/12/2021      Patient ID: Itzel Hogan is a 50 y.o. female.    Chief Complaint:    Palpitations  Hypertension  Dyslipidemia  Shortness of breath        History of Present Illness  Since I have last seen, the patient has been without any chest discomfort ,shortness of breath, palpitations, dizziness or syncope.  Denies having any headache ,abdominal pain ,nausea, vomiting , diarrhea constipation, loss of weight or loss of appetite.  Denies having any excessive bruising ,hematuria or blood in the stool.    Review of all systems negative except as indicated.    Reviewed ROS.  Assessment and Plan      ]]]]]]]]]]]]]]]]]  Impression  ========  -Palpitations-better     -Exertional shortness of breath-better     Echocardiogram-normal 2/11/2020  Stress Cardiolite test-normal 2/11/2020     -Hypertension dyslipidemia migraine headaches recent cholesterol 233  (March 2020)     -Status post tonsillectomy sinus surgery carpal tunnel release     -Family history of coronary artery disease.     -Non-smoker     -Allergic to penicillin cephalexin prochlorperazine  ===========  Plan  ========  Palpitations-better.     Shortness of breath better.  EKG showed sinus rhythm without any ischemic changes     Hypertension-well-controlled-119/82  Continue present medications.     Dyslipidemia-diet controlled    Medications were reviewed and updated.  Patient is not having any angina pectoris or congestive heart failure    Follow-up in the office in 6 months.     Further plan will depend on patient's progress.  [[[[[[[[[[[[[[[[[                 Diagnosis Plan   1. SOB (shortness of breath)  ECG 12 Lead   2. Palpitations  ECG 12 Lead   3. Essential hypertension  ECG 12 Lead   4. Dyslipidemia  ECG 12 Lead   LAB RESULTS (LAST 7 DAYS)    CBC        BMP        CMP         BNP        TROPONIN        CoAg        Creatinine Clearance  CrCl cannot be calculated (Patient's most recent lab result is older  than the maximum 30 days allowed.).    ABG        Radiology  No radiology results for the last day                The following portions of the patient's history were reviewed and updated as appropriate: allergies, current medications, past family history, past medical history, past social history, past surgical history and problem list.    Review of Systems   Constitutional: Negative for malaise/fatigue.   Cardiovascular: Negative for chest pain, leg swelling, palpitations and syncope.   Respiratory: Negative for shortness of breath.    Skin: Negative for rash.   Gastrointestinal: Negative for nausea and vomiting.   Neurological: Negative for dizziness, light-headedness and numbness.         Current Outpatient Medications:   •  azelastine (ASTELIN) 0.1 % nasal spray, , Disp: , Rfl:   •  B Complex Vitamins (vitamin b complex) capsule capsule, Take  by mouth Daily., Disp: , Rfl:   •  cetirizine (ZYRTEC ALLERGY) 10 MG tablet, Daily., Disp: , Rfl:   •  cholecalciferol (VITAMIN D3) 25 MCG (1000 UT) tablet, Take 1,000 Units by mouth Daily., Disp: , Rfl:   •  cyclobenzaprine (FLEXERIL) 10 MG tablet, Take 1 tablet by mouth Every Night., Disp: 60 tablet, Rfl: 0  •  ferrous sulfate 325 (65 FE) MG tablet, Take 1 tablet by mouth 2 (Two) Times a Day., Disp: , Rfl:   •  fluticasone (FLONASE ALLERGY RELIEF) 50 MCG/ACT nasal spray, FLONASE ALLERGY RELIEF 50 MCG/ACT SUSP, Disp: , Rfl:   •  hydrOXYzine (ATARAX) 25 MG tablet, Take 1 tablet by mouth 3 (Three) Times a Day As Needed for Itching., Disp: 30 tablet, Rfl: 2  •  lisinopril (PRINIVIL,ZESTRIL) 10 MG tablet, Take 1 tablet by mouth Daily., Disp: 30 tablet, Rfl: 3  •  omeprazole (priLOSEC) 20 MG capsule, TAKE 1 CAPSULE BY MOUTH EVERY DAY AS DIRECTED, Disp: , Rfl:   •  predniSONE (DELTASONE) 20 MG tablet, Take 2 tabs once daily x 4 days, then take 1 tab once daily x 4 days, then take 1/2 tab once daily x 2 days, Disp: 13 tablet, Rfl: 0  •  vitamin C (ASCORBIC ACID) 500 MG tablet,  Take 500 mg by mouth Daily., Disp: , Rfl:   •  Zinc 50 MG capsule, Take  by mouth., Disp: , Rfl:   •  omeprazole (priLOSEC) 40 MG capsule, TK 1 C PO QD, Disp: , Rfl:     Allergies   Allergen Reactions   • Penicillins Hives   • Prochlorperazine Dizziness   • Stadol [Butorphanol] Dizziness   • Cephalexin Hives       Family History   Problem Relation Age of Onset   • Stroke Mother    • Heart failure Mother    • Cancer Mother    • Asthma Mother    • Hyperlipidemia Mother    • Hypertension Mother    • Skin cancer Father    • Anemia Father    • Asthma Sister    • Heart attack Maternal Grandmother    • Heart attack Maternal Grandfather        Past Surgical History:   Procedure Laterality Date   • CARPAL TUNNEL RELEASE Bilateral    • COLONOSCOPY     • SINUS SURGERY     • TONSILLECTOMY     • WISDOM TOOTH EXTRACTION         Past Medical History:   Diagnosis Date   • Allergies    • Anemia    • B12 deficiency    • Hyperlipidemia    • Hypertension    • Migraine        Family History   Problem Relation Age of Onset   • Stroke Mother    • Heart failure Mother    • Cancer Mother    • Asthma Mother    • Hyperlipidemia Mother    • Hypertension Mother    • Skin cancer Father    • Anemia Father    • Asthma Sister    • Heart attack Maternal Grandmother    • Heart attack Maternal Grandfather        Social History     Socioeconomic History   • Marital status:      Spouse name: Not on file   • Number of children: Not on file   • Years of education: Not on file   • Highest education level: Not on file   Tobacco Use   • Smoking status: Never Smoker   • Smokeless tobacco: Never Used   Substance and Sexual Activity   • Alcohol use: Not Currently     Alcohol/week: 0.0 standard drinks     Comment: occasionally   • Drug use: Never   • Sexual activity: Yes     Partners: Male     Birth control/protection: Condom           ECG 12 Lead    Date/Time: 7/22/2021 11:29 AM  Performed by: Oleg Flores MD  Authorized by: Oleg Flores MD  "  Comparison: compared with previous ECG   Similar to previous ECG  Comparison to previous ECG: Normal sinus rhythm nonspecific ST-T wave changes 77/min normal axis normal intervals no ectopy no significant change from 6/29/2020                Objective:       Physical Exam    /79   Pulse 83   Ht 160 cm (63\")   Wt 79.8 kg (176 lb)   SpO2 100%   BMI 31.18 kg/m²   The patient is alert, oriented and in no distress.    Vital signs as noted above.    Head and neck revealed no carotid bruits or jugular venous distension.  No thyromegaly or lymphadenopathy is present.    Lungs clear.  No wheezing.  Breath sounds are normal bilaterally.    Heart normal first and second heart sounds.  No murmur..  No pericardial rub is present.  No gallop is present.    Abdomen soft and nontender.  No organomegaly is present.    Extremities revealed good peripheral pulses without any pedal edema.    Skin warm and dry.    Musculoskeletal system is grossly normal.    CNS grossly normal.    Reviewed and unchanged from last visit.        "

## 2021-07-23 DIAGNOSIS — L23.7 POISON IVY DERMATITIS: ICD-10-CM

## 2021-07-23 RX ORDER — PREDNISONE 20 MG/1
TABLET ORAL
Qty: 13 TABLET | Refills: 0 | OUTPATIENT
Start: 2021-07-23

## 2021-07-23 NOTE — TELEPHONE ENCOUNTER
Caller: Samira Itzel R    Relationship: Self    Best call back number: [phone]   Medication needed:   Requested Prescriptions     Pending Prescriptions Disp Refills   • predniSONE (DELTASONE) 20 MG tablet 13 tablet 0     Sig: Take 2 tabs once daily x 4 days, then take 1 tab once daily x 4 days, then take 1/2 tab once daily x 2 days       When do you need the refill by: 07/23/21    What additional details did the patient provide when requesting the medication: PATIENT HAS POISON IVY AND WOULD LIKE TO GET THIS REFILLED. SHE WILL BE LEAVING [facility] Monday MORNING    Does the patient have less than a 3 day supply:  [x] Yes  [] No    What is the patient's preferred pharmacy: [facility] #[id number] - [place], [place] - [address] - [phone]  - [phone] FX

## 2021-08-10 NOTE — PROGRESS NOTES
Subjective  Answers for HPI/ROS submitted by the patient on 8/11/2021  What is the primary reason for your visit?: Abbi Hogan is a 50 y.o. female.       HPI   Pt. is here today with a rash on her abdomen.   Has been working in the yard and feels she came into contact with poison ivy again.    Rash started mid abdomen but has now spread to both sides and down onto the right upper leg.  Itching intensely.  No open sores or lesions.   Pt. reports last time this happened the steroid shot worked well for her.        The following portions of the patient's history were reviewed and updated as appropriate: allergies, current medications, past family history, past medical history, past social history, past surgical history and problem list.    Review of Systems   Constitutional: Negative for fatigue and fever.   HENT: Negative for congestion, rhinorrhea and sore throat.    Eyes: Negative for pain.   Respiratory: Negative for cough and shortness of breath.    Gastrointestinal: Negative for diarrhea and vomiting.   Skin: Positive for rash.       Objective   Physical Exam  Vitals reviewed.   Constitutional:       General: She is not in acute distress.     Appearance: Normal appearance.   Cardiovascular:      Rate and Rhythm: Normal rate and regular rhythm.      Pulses: Normal pulses.      Heart sounds: Normal heart sounds. No murmur heard.     Pulmonary:      Effort: Pulmonary effort is normal. No respiratory distress.      Breath sounds: Normal breath sounds. No wheezing.   Chest:      Chest wall: No tenderness.   Abdominal:      Tenderness: There is no right CVA tenderness or left CVA tenderness.   Skin:     Findings: Rash (erythmatous papular/vesicular rash on abdomen, extending onto right upper leg.  Several areas in linear pattern.) present.   Neurological:      General: No focal deficit present.      Mental Status: She is alert and oriented to person, place, and time.   Psychiatric:         Mood and  Affect: Mood normal.           Assessment/Plan   Diagnoses and all orders for this visit:    1. Contact dermatitis due to poison ivy (Primary)  Comments:  Given 80 mg Depo Medrol IM today.    Can start oral prednisone taper on Friday.    Can use otc Calamine lotion; oatmeal baths for itch.   Orders:  -     predniSONE (DELTASONE) 20 MG tablet; Take 2 tabs once daily x 4 days, then take 1 tab once daily x 4 days, then take 1/2 tab once daily x 2 days  Dispense: 13 tablet; Refill: 0  -     methylPREDNISolone acetate (DEPO-medrol) injection 80 mg

## 2021-08-11 ENCOUNTER — OFFICE VISIT (OUTPATIENT)
Dept: FAMILY MEDICINE CLINIC | Facility: CLINIC | Age: 51
End: 2021-08-11

## 2021-08-11 VITALS
HEIGHT: 63 IN | BODY MASS INDEX: 30.83 KG/M2 | HEART RATE: 107 BPM | WEIGHT: 174 LBS | SYSTOLIC BLOOD PRESSURE: 137 MMHG | DIASTOLIC BLOOD PRESSURE: 93 MMHG | OXYGEN SATURATION: 99 %

## 2021-08-11 DIAGNOSIS — L23.7 CONTACT DERMATITIS DUE TO POISON IVY: Primary | ICD-10-CM

## 2021-08-11 PROCEDURE — 99213 OFFICE O/P EST LOW 20 MIN: CPT | Performed by: NURSE PRACTITIONER

## 2021-08-11 PROCEDURE — 96372 THER/PROPH/DIAG INJ SC/IM: CPT | Performed by: NURSE PRACTITIONER

## 2021-08-11 RX ORDER — METHYLPREDNISOLONE ACETATE 80 MG/ML
80 INJECTION, SUSPENSION INTRA-ARTICULAR; INTRALESIONAL; INTRAMUSCULAR; SOFT TISSUE ONCE
Status: COMPLETED | OUTPATIENT
Start: 2021-08-11 | End: 2021-08-11

## 2021-08-11 RX ORDER — PREDNISONE 20 MG/1
TABLET ORAL
Qty: 13 TABLET | Refills: 0 | Status: SHIPPED | OUTPATIENT
Start: 2021-08-11 | End: 2022-03-08

## 2021-08-11 RX ADMIN — METHYLPREDNISOLONE ACETATE 80 MG: 80 INJECTION, SUSPENSION INTRA-ARTICULAR; INTRALESIONAL; INTRAMUSCULAR; SOFT TISSUE at 10:44

## 2021-08-11 NOTE — PATIENT INSTRUCTIONS
Poison Ivy Dermatitis  Poison ivy dermatitis is inflammation of the skin that is caused by chemicals in the leaves of the poison ivy plant. The skin reaction often involves redness, swelling, blisters, and extreme itching.  What are the causes?  This condition is caused by a chemical (urushiol) found in the sap of the poison ivy plant. This chemical is sticky and can be easily spread to people, animals, and objects. You can get poison ivy dermatitis by:  · Having direct contact with a poison ivy plant.  · Touching animals, other people, or objects that have come in contact with poison ivy and have the chemical on them.  What increases the risk?  This condition is more likely to develop in people who:  · Are outdoors often in wooded or marshy areas.  · Go outdoors without wearing protective clothing, such as closed shoes, long pants, and a long-sleeved shirt.  What are the signs or symptoms?  Symptoms of this condition include:  · Redness of the skin.  · Extreme itching.  · A rash that often includes bumps and blisters. The rash usually appears 48 hours after exposure, if you have been exposed before. If this is the first time you have been exposed, the rash may not appear until a week after exposure.  · Swelling. This may occur if the reaction is more severe.  Symptoms usually last for 1-2 weeks. However, the first time you develop this condition, symptoms may last 3-4 weeks.  How is this diagnosed?  This condition may be diagnosed based on your symptoms and a physical exam. Your health care provider may also ask you about any recent outdoor activity.  How is this treated?  Treatment for this condition will vary depending on how severe it is. Treatment may include:  · Hydrocortisone cream or calamine lotion to relieve itching.  · Oatmeal baths to soothe the skin.  · Medicines, such as over-the-counter antihistamine tablets.  · Oral steroid medicine, for more severe reactions.  Follow these instructions at  home:  Medicines  · Take or apply over-the-counter and prescription medicines only as told by your health care provider.  · Use hydrocortisone cream or calamine lotion as needed to soothe the skin and relieve itching.  General instructions  · Do not scratch or rub your skin.  · Apply a cold, wet cloth (cold compress) to the affected areas or take baths in cool water. This will help with itching. Avoid hot baths and showers.  · Take oatmeal baths as needed. Use colloidal oatmeal. You can get this at your local pharmacy or grocery store. Follow the instructions on the packaging.  · While you have the rash, wash clothes right after you wear them.  · Keep all follow-up visits as told by your health care provider. This is important.  How is this prevented?    · Learn to identify the poison ivy plant and avoid contact with the plant. This plant can be recognized by the number of leaves. Generally, poison ivy has three leaves with flowering branches on a single stem. The leaves are typically glossy, and they have jagged edges that come to a point at the front.  · If you have been exposed to poison ivy, thoroughly wash with soap and water right away. You have about 30 minutes to remove the plant resin before it will cause the rash. Be sure to wash under your fingernails, because any plant resin there will continue to spread the rash.  · When hiking or camping, wear clothes that will help you to avoid exposure on the skin. This includes long pants, a long-sleeved shirt, tall socks, and hiking boots. You can also apply preventive lotion to your skin to help limit exposure.  · If you suspect that your clothes or outdoor gear came in contact with poison ivy, rinse them off outside with a garden hose before you bring them inside your house.  · When doing yard work or gardening, wear gloves, long sleeves, long pants, and boots. Wash your garden tools and gloves if they come in contact with poison ivy.  · If you suspect that your  pet has come into contact with poison ivy, wash him or her with pet shampoo and water. Make sure to wear gloves while washing your pet.  Contact a health care provider if you have:  · Open sores in the rash area.  · More redness, swelling, or pain in the affected area.  · Redness that spreads beyond the rash area.  · Fluid, blood, or pus coming from the affected area.  · A fever.  · A rash over a large area of your body.  · A rash on your eyes, mouth, or genitals.  · A rash that does not improve after a few weeks.  Get help right away if:  · Your face swells or your eyes swell shut.  · You have trouble breathing.  · You have trouble swallowing.  These symptoms may represent a serious problem that is an emergency. Do not wait to see if the symptoms will go away. Get medical help right away. Call your local emergency services (911 in the [place] Do not drive yourself to the hospital.  Summary  · Poison ivy dermatitis is inflammation of the skin that is caused by chemicals in the leaves of the poison ivy plant.  · Symptoms of this condition include redness, itching, a rash, and swelling.  · Do not scratch or rub your skin.  · Take or apply over-the-counter and prescription medicines only as told by your health care provider.  This information is not intended to replace advice given to you by your health care provider. Make sure you discuss any questions you have with your health care provider.  Document Revised: 04/10/2020 Document Reviewed: 12/13/2019  [employer] Patient Education © 2021 [employer]

## 2021-08-22 PROCEDURE — U0003 INFECTIOUS AGENT DETECTION BY NUCLEIC ACID (DNA OR RNA); SEVERE ACUTE RESPIRATORY SYNDROME CORONAVIRUS 2 (SARS-COV-2) (CORONAVIRUS DISEASE [COVID-19]), AMPLIFIED PROBE TECHNIQUE, MAKING USE OF HIGH THROUGHPUT TECHNOLOGIES AS DESCRIBED BY CMS-2020-01-R: HCPCS | Performed by: NURSE PRACTITIONER

## 2022-01-27 ENCOUNTER — OFFICE VISIT (OUTPATIENT)
Dept: CARDIOLOGY | Facility: CLINIC | Age: 52
End: 2022-01-27

## 2022-01-27 VITALS
HEIGHT: 62 IN | OXYGEN SATURATION: 99 % | BODY MASS INDEX: 33.95 KG/M2 | SYSTOLIC BLOOD PRESSURE: 139 MMHG | DIASTOLIC BLOOD PRESSURE: 83 MMHG | WEIGHT: 184.5 LBS | HEART RATE: 97 BPM

## 2022-01-27 DIAGNOSIS — R06.02 SOB (SHORTNESS OF BREATH): Primary | ICD-10-CM

## 2022-01-27 DIAGNOSIS — E78.5 DYSLIPIDEMIA: ICD-10-CM

## 2022-01-27 DIAGNOSIS — R00.2 PALPITATIONS: ICD-10-CM

## 2022-01-27 DIAGNOSIS — I10 ESSENTIAL HYPERTENSION: ICD-10-CM

## 2022-01-27 PROCEDURE — 99214 OFFICE O/P EST MOD 30 MIN: CPT | Performed by: INTERNAL MEDICINE

## 2022-01-27 PROCEDURE — 93000 ELECTROCARDIOGRAM COMPLETE: CPT | Performed by: INTERNAL MEDICINE

## 2022-01-27 NOTE — PROGRESS NOTES
Encounter Date:01/27/2022  Last seen 7/22/2021      Patient ID: Itzel Hogan is a 51 y.o. female.    Chief Complaint:    Palpitations  Hypertension  Dyslipidemia  Shortness of breath        History of Present Illness  Since I have last seen, the patient has been without any chest discomfort ,shortness of breath, palpitations, dizziness or syncope.  Denies having any headache ,abdominal pain ,nausea, vomiting , diarrhea constipation, loss of weight or loss of appetite.  Denies having any excessive bruising ,hematuria or blood in the stool.    Review of all systems negative except as indicated.    Reviewed ROS.  Assessment and Plan      ]]]]]]]]]]]]]]]]]  Impression  ========  -Palpitations-better     -Exertional shortness of breath-better     Echocardiogram-normal 2/11/2020  Stress Cardiolite test-normal 2/11/2020     -Hypertension dyslipidemia migraine headaches recent cholesterol 233  (March 2020)     -Status post tonsillectomy sinus surgery carpal tunnel release     -Family history of coronary artery disease.     -Non-smoker     -Allergic to penicillin cephalexin prochlorperazine  ===========  Plan  ========  Palpitations-better.     Shortness of breath better.  EKG showed sinus rhythm without any ischemic changes-1/27/2022     Hypertension-well-controlled-139/80  Continue present medications.     Dyslipidemia-diet controlled     Medications were reviewed and updated.  Patient is not having any angina pectoris or congestive heart failure     Follow-up in the office in 1 year.    Further plan will depend on patient's progress.  [[[[[[[[[[[[[[[[[                  Diagnosis Plan   1. SOB (shortness of breath)  ECG 12 Lead   2. Palpitations  ECG 12 Lead   3. Essential hypertension  ECG 12 Lead   4. Dyslipidemia  ECG 12 Lead   LAB RESULTS (LAST 7 DAYS)    CBC        BMP        CMP         BNP        TROPONIN        CoAg        Creatinine Clearance  CrCl cannot be calculated (Patient's most recent lab result  is older than the maximum 30 days allowed.).    AB        Radiology  No radiology results for the last day                The following portions of the patient's history were reviewed and updated as appropriate: allergies, current medications, past family history, past medical history, past social history, past surgical history and problem list.    Review of Systems   Constitutional: Negative for fever and malaise/fatigue.   HENT: Negative for ear pain and nosebleeds.    Eyes: Negative for blurred vision and double vision.   Cardiovascular: Negative for chest pain, dyspnea on exertion and palpitations.   Respiratory: Negative for cough and shortness of breath.    Skin: Negative for rash.   Musculoskeletal: Negative for joint pain.   Gastrointestinal: Negative for abdominal pain, nausea and vomiting.   Neurological: Negative for focal weakness and headaches.   Psychiatric/Behavioral: Negative for depression. The patient is not nervous/anxious.    All other systems reviewed and are negative.        Current Outpatient Medications:   •  azelastine (ASTELIN) 0.1 % nasal spray, , Disp: , Rfl:   •  B Complex Vitamins (vitamin b complex) capsule capsule, Take  by mouth Daily., Disp: , Rfl:   •  cetirizine (ZYRTEC ALLERGY) 10 MG tablet, Daily., Disp: , Rfl:   •  cholecalciferol (VITAMIN D3) 25 MCG (1000 UT) tablet, Take 1,000 Units by mouth Daily., Disp: , Rfl:   •  cyclobenzaprine (FLEXERIL) 10 MG tablet, Take 1 tablet by mouth Every Night., Disp: 60 tablet, Rfl: 0  •  ferrous sulfate 325 (65 FE) MG tablet, Take 1 tablet by mouth 2 (Two) Times a Day., Disp: , Rfl:   •  fluticasone (FLONASE ALLERGY RELIEF) 50 MCG/ACT nasal spray, FLONASE ALLERGY RELIEF 50 MCG/ACT SUSP, Disp: , Rfl:   •  hydrOXYzine (ATARAX) 25 MG tablet, Take 1 tablet by mouth 3 (Three) Times a Day As Needed for Itching., Disp: 30 tablet, Rfl: 2  •  lisinopril (PRINIVIL,ZESTRIL) 10 MG tablet, Take 1 tablet by mouth Daily., Disp: 30 tablet, Rfl: 3  •   omeprazole (priLOSEC) 20 MG capsule, TAKE 1 CAPSULE BY MOUTH EVERY DAY AS DIRECTED, Disp: , Rfl:   •  vitamin C (ASCORBIC ACID) 500 MG tablet, Take 500 mg by mouth Daily., Disp: , Rfl:   •  vitamin E 100 UNIT capsule, Take 100 Units by mouth Daily., Disp: , Rfl:   •  Zinc 50 MG capsule, Take  by mouth., Disp: , Rfl:   •  azithromycin (Zithromax Z-Gerry) 250 MG tablet, Take 2 tablets the first day, then 1 tablet daily for 4 days., Disp: 6 tablet, Rfl: 0  •  predniSONE (DELTASONE) 20 MG tablet, Take 2 tabs once daily x 4 days, then take 1 tab once daily x 4 days, then take 1/2 tab once daily x 2 days, Disp: 13 tablet, Rfl: 0    Allergies   Allergen Reactions   • Penicillins Hives   • Prochlorperazine Dizziness   • Stadol [Butorphanol] Dizziness   • Cephalexin Hives       Family History   Problem Relation Age of Onset   • Stroke Mother    • Heart failure Mother    • Cancer Mother    • Asthma Mother    • Hyperlipidemia Mother    • Hypertension Mother    • Skin cancer Father    • Anemia Father    • Asthma Sister    • Heart attack Maternal Grandmother    • Heart attack Maternal Grandfather        Past Surgical History:   Procedure Laterality Date   • CARPAL TUNNEL RELEASE Bilateral    • COLONOSCOPY     • SINUS SURGERY     • TONSILLECTOMY     • WISDOM TOOTH EXTRACTION         Past Medical History:   Diagnosis Date   • Allergies    • Anemia    • B12 deficiency    • Hyperlipidemia    • Hypertension    • Migraine        Family History   Problem Relation Age of Onset   • Stroke Mother    • Heart failure Mother    • Cancer Mother    • Asthma Mother    • Hyperlipidemia Mother    • Hypertension Mother    • Skin cancer Father    • Anemia Father    • Asthma Sister    • Heart attack Maternal Grandmother    • Heart attack Maternal Grandfather        Social History     Socioeconomic History   • Marital status:    Tobacco Use   • Smoking status: Never Smoker   • Smokeless tobacco: Never Used   Vaping Use   • Vaping Use: Never used  "  Substance and Sexual Activity   • Alcohol use: Not Currently     Alcohol/week: 0.0 standard drinks     Comment: occasionally   • Drug use: Never   • Sexual activity: Yes     Partners: Male     Birth control/protection: Condom           ECG 12 Lead    Date/Time: 1/27/2022 11:17 AM  Performed by: Oleg Flores MD  Authorized by: Oleg Flores MD   Comparison: compared with previous ECG   Similar to previous ECG  Comparison to previous ECG: Normal sinus rhythm normal ECG 86/min normal axis normal intervals no ectopy no significant change from 7/22/2021                Objective:       Physical Exam    /83   Pulse 97   Ht 157.5 cm (62\")   Wt 83.7 kg (184 lb 8 oz)   SpO2 99%   BMI 33.75 kg/m²   The patient is alert, oriented and in no distress.    Vital signs as noted above.    Head and neck revealed no carotid bruits or jugular venous distension.  No thyromegaly or lymphadenopathy is present.    Lungs clear.  No wheezing.  Breath sounds are normal bilaterally.    Heart normal first and second heart sounds.  No murmur..  No pericardial rub is present.  No gallop is present.    Abdomen soft and nontender.  No organomegaly is present.    Extremities revealed good peripheral pulses without any pedal edema.    Skin warm and dry.    Musculoskeletal system is grossly normal.    CNS grossly normal.    Reviewed and updated.        "

## 2022-03-07 NOTE — PROGRESS NOTES
Subjective   Itzel Hogan is a 51 y.o. female.       HPI   Pt is here today for follow up on HTN.   Currently on lisinopril 10 mg daily.   Denies any Cp; palpitations; SOA; dizziness; headache; trouble with vision.   Last lipid check was 2019; TC was 210; Trigs 252; .  She admits diet could be improved.      The following portions of the patient's history were reviewed and updated as appropriate: allergies, current medications, past family history, past medical history, past social history, past surgical history and problem list.    Review of Systems   Constitutional: Negative for activity change, appetite change, chills, diaphoresis, fatigue, fever, unexpected weight gain and unexpected weight loss.   Eyes: Negative for visual disturbance.   Respiratory: Negative for cough, chest tightness, shortness of breath and wheezing.    Cardiovascular: Negative for chest pain, palpitations and leg swelling.   Gastrointestinal: Negative for diarrhea, nausea and vomiting.   Genitourinary: Negative for dysuria, flank pain, frequency and urgency.   Neurological: Negative for dizziness, weakness and headache.   Psychiatric/Behavioral: Negative for depressed mood. The patient is not nervous/anxious.        Objective   Physical Exam  Vitals reviewed.   Constitutional:       General: She is not in acute distress.     Appearance: Normal appearance. She is obese.   Cardiovascular:      Rate and Rhythm: Normal rate and regular rhythm.      Pulses: Normal pulses.      Heart sounds: Normal heart sounds. No murmur heard.  Pulmonary:      Effort: Pulmonary effort is normal. No respiratory distress.      Breath sounds: Normal breath sounds. No wheezing or rhonchi.   Chest:      Chest wall: No tenderness.   Abdominal:      Tenderness: There is no right CVA tenderness or left CVA tenderness.   Musculoskeletal:      Right lower leg: No edema.      Left lower leg: No edema.   Skin:     General: Skin is warm and dry.      Findings: No  erythema.   Neurological:      General: No focal deficit present.      Mental Status: She is alert and oriented to person, place, and time.   Psychiatric:         Mood and Affect: Mood normal.           Assessment/Plan   Diagnoses and all orders for this visit:    1. Essential hypertension (Primary)  Comments:  Stable.   Cont. current medicaiton.   Labs today.   RTO in 6 mo.   Orders:  -     Comprehensive metabolic panel; Future  -     Lipid panel; Future  -     lisinopril (PRINIVIL,ZESTRIL) 10 MG tablet; Take 1 tablet by mouth Daily.  Dispense: 90 tablet; Refill: 3  -     Hemoglobin A1c; Future    2. Mixed hyperlipidemia  Comments:  Labs today.   Work on healthy lifestyle changes.   Orders:  -     Hemoglobin A1c; Future    3. Health care maintenance  Comments:  Labs today.   Orders:  -     Hepatitis C antibody; Future

## 2022-03-08 ENCOUNTER — LAB (OUTPATIENT)
Dept: FAMILY MEDICINE CLINIC | Facility: CLINIC | Age: 52
End: 2022-03-08

## 2022-03-08 ENCOUNTER — OFFICE VISIT (OUTPATIENT)
Dept: FAMILY MEDICINE CLINIC | Facility: CLINIC | Age: 52
End: 2022-03-08

## 2022-03-08 VITALS
WEIGHT: 181 LBS | DIASTOLIC BLOOD PRESSURE: 87 MMHG | HEIGHT: 62 IN | HEART RATE: 75 BPM | OXYGEN SATURATION: 100 % | SYSTOLIC BLOOD PRESSURE: 128 MMHG | BODY MASS INDEX: 33.31 KG/M2

## 2022-03-08 DIAGNOSIS — E78.2 MIXED HYPERLIPIDEMIA: ICD-10-CM

## 2022-03-08 DIAGNOSIS — Z00.00 HEALTH CARE MAINTENANCE: ICD-10-CM

## 2022-03-08 DIAGNOSIS — I10 ESSENTIAL HYPERTENSION: ICD-10-CM

## 2022-03-08 DIAGNOSIS — I10 ESSENTIAL HYPERTENSION: Primary | ICD-10-CM

## 2022-03-08 LAB
ALBUMIN SERPL-MCNC: 4.7 G/DL (ref 3.5–5.2)
ALBUMIN/GLOB SERPL: 1.8 G/DL
ALP SERPL-CCNC: 77 U/L (ref 39–117)
ALT SERPL W P-5'-P-CCNC: 59 U/L (ref 1–33)
ANION GAP SERPL CALCULATED.3IONS-SCNC: 10.6 MMOL/L (ref 5–15)
AST SERPL-CCNC: 34 U/L (ref 1–32)
BILIRUB SERPL-MCNC: 0.4 MG/DL (ref 0–1.2)
BUN SERPL-MCNC: 11 MG/DL (ref 6–20)
BUN/CREAT SERPL: 15.3 (ref 7–25)
CALCIUM SPEC-SCNC: 9.7 MG/DL (ref 8.6–10.5)
CHLORIDE SERPL-SCNC: 101 MMOL/L (ref 98–107)
CHOLEST SERPL-MCNC: 233 MG/DL (ref 0–200)
CO2 SERPL-SCNC: 27.4 MMOL/L (ref 22–29)
CREAT SERPL-MCNC: 0.72 MG/DL (ref 0.57–1)
EGFRCR SERPLBLD CKD-EPI 2021: 102 ML/MIN/1.73
GLOBULIN UR ELPH-MCNC: 2.6 GM/DL
GLUCOSE SERPL-MCNC: 97 MG/DL (ref 65–99)
HBA1C MFR BLD: 5.4 % (ref 3.5–5.6)
HCV AB SER DONR QL: NORMAL
HDLC SERPL-MCNC: 50 MG/DL (ref 40–60)
LDLC SERPL CALC-MCNC: 158 MG/DL (ref 0–100)
LDLC/HDLC SERPL: 3.1 {RATIO}
POTASSIUM SERPL-SCNC: 4.2 MMOL/L (ref 3.5–5.2)
PROT SERPL-MCNC: 7.3 G/DL (ref 6–8.5)
SODIUM SERPL-SCNC: 139 MMOL/L (ref 136–145)
TRIGL SERPL-MCNC: 140 MG/DL (ref 0–150)
VLDLC SERPL-MCNC: 25 MG/DL (ref 5–40)

## 2022-03-08 PROCEDURE — 36415 COLL VENOUS BLD VENIPUNCTURE: CPT

## 2022-03-08 PROCEDURE — 80053 COMPREHEN METABOLIC PANEL: CPT | Performed by: NURSE PRACTITIONER

## 2022-03-08 PROCEDURE — 86803 HEPATITIS C AB TEST: CPT | Performed by: NURSE PRACTITIONER

## 2022-03-08 PROCEDURE — 80061 LIPID PANEL: CPT | Performed by: NURSE PRACTITIONER

## 2022-03-08 PROCEDURE — 99214 OFFICE O/P EST MOD 30 MIN: CPT | Performed by: NURSE PRACTITIONER

## 2022-03-08 PROCEDURE — 83036 HEMOGLOBIN GLYCOSYLATED A1C: CPT | Performed by: NURSE PRACTITIONER

## 2022-03-08 RX ORDER — ONDANSETRON 4 MG/1
4 TABLET, FILM COATED ORAL EVERY 8 HOURS
COMMUNITY
Start: 2022-03-01 | End: 2022-09-09

## 2022-03-08 RX ORDER — LISINOPRIL 10 MG/1
10 TABLET ORAL DAILY
Qty: 90 TABLET | Refills: 3 | Status: SHIPPED | OUTPATIENT
Start: 2022-03-08 | End: 2023-02-20

## 2022-03-10 DIAGNOSIS — R74.8 ELEVATED LIVER ENZYMES: Primary | ICD-10-CM

## 2022-03-27 PROBLEM — J10.1 INFLUENZA A: Status: ACTIVE | Noted: 2017-03-22

## 2022-03-27 PROBLEM — I10 HYPERTENSION: Status: ACTIVE | Noted: 2017-03-31

## 2022-03-27 PROBLEM — L25.9 CONTACT DERMATITIS: Status: ACTIVE | Noted: 2018-05-22

## 2022-03-27 PROBLEM — Z23 NEED FOR OTHER PROPHYLACTIC VACCINATION AND INOCULATION AGAINST SINGLE DISEASES: Status: ACTIVE | Noted: 2019-03-19

## 2022-03-27 PROBLEM — N39.0 URINARY TRACT INFECTION: Status: ACTIVE | Noted: 2018-10-07

## 2022-03-27 PROBLEM — M25.511 PAIN IN JOINT OF RIGHT SHOULDER: Status: ACTIVE | Noted: 2018-09-18

## 2022-03-27 PROBLEM — M54.2 NECK PAIN: Status: ACTIVE | Noted: 2019-04-01

## 2022-03-27 PROBLEM — G43.909 HEADACHE, MIGRAINE: Status: ACTIVE | Noted: 2022-03-27

## 2022-03-27 PROBLEM — R30.0 DIFFICULT OR PAINFUL URINATION: Status: ACTIVE | Noted: 2018-10-07

## 2022-03-27 PROBLEM — E66.3 OVERWEIGHT: Status: ACTIVE | Noted: 2017-03-31

## 2022-03-27 PROBLEM — Z00.8 OTHER SPECIFIED GENERAL MEDICAL EXAMINATIONS: Status: ACTIVE | Noted: 2017-03-31

## 2022-04-11 ENCOUNTER — LAB (OUTPATIENT)
Dept: FAMILY MEDICINE CLINIC | Facility: CLINIC | Age: 52
End: 2022-04-11

## 2022-04-11 DIAGNOSIS — R74.8 ELEVATED LIVER ENZYMES: ICD-10-CM

## 2022-04-11 LAB
ALBUMIN SERPL-MCNC: 4.2 G/DL (ref 3.5–5.2)
ALP SERPL-CCNC: 87 U/L (ref 39–117)
ALT SERPL W P-5'-P-CCNC: 25 U/L (ref 1–33)
AST SERPL-CCNC: 22 U/L (ref 1–32)
BILIRUB CONJ SERPL-MCNC: <0.2 MG/DL (ref 0–0.3)
BILIRUB INDIRECT SERPL-MCNC: NORMAL MG/DL
BILIRUB SERPL-MCNC: 0.4 MG/DL (ref 0–1.2)
PROT SERPL-MCNC: 7.1 G/DL (ref 6–8.5)

## 2022-04-11 PROCEDURE — 36415 COLL VENOUS BLD VENIPUNCTURE: CPT

## 2022-04-11 PROCEDURE — 80076 HEPATIC FUNCTION PANEL: CPT | Performed by: NURSE PRACTITIONER

## 2022-06-20 DIAGNOSIS — K21.9 GASTROESOPHAGEAL REFLUX DISEASE, UNSPECIFIED WHETHER ESOPHAGITIS PRESENT: Primary | ICD-10-CM

## 2022-06-20 RX ORDER — OMEPRAZOLE 20 MG/1
20 CAPSULE, DELAYED RELEASE ORAL DAILY
Qty: 90 CAPSULE | Refills: 1 | Status: SHIPPED | OUTPATIENT
Start: 2022-06-20 | End: 2022-10-27 | Stop reason: SDUPTHER

## 2022-09-09 ENCOUNTER — OFFICE VISIT (OUTPATIENT)
Dept: FAMILY MEDICINE CLINIC | Facility: CLINIC | Age: 52
End: 2022-09-09

## 2022-09-09 ENCOUNTER — LAB (OUTPATIENT)
Dept: FAMILY MEDICINE CLINIC | Facility: CLINIC | Age: 52
End: 2022-09-09

## 2022-09-09 VITALS
OXYGEN SATURATION: 99 % | BODY MASS INDEX: 32.57 KG/M2 | HEIGHT: 62 IN | DIASTOLIC BLOOD PRESSURE: 76 MMHG | HEART RATE: 87 BPM | WEIGHT: 177 LBS | SYSTOLIC BLOOD PRESSURE: 108 MMHG

## 2022-09-09 DIAGNOSIS — K21.9 GASTROESOPHAGEAL REFLUX DISEASE, UNSPECIFIED WHETHER ESOPHAGITIS PRESENT: ICD-10-CM

## 2022-09-09 DIAGNOSIS — F41.9 ANXIETY: ICD-10-CM

## 2022-09-09 DIAGNOSIS — I10 ESSENTIAL HYPERTENSION: ICD-10-CM

## 2022-09-09 DIAGNOSIS — I10 ESSENTIAL HYPERTENSION: Primary | ICD-10-CM

## 2022-09-09 DIAGNOSIS — E78.2 MIXED HYPERLIPIDEMIA: ICD-10-CM

## 2022-09-09 LAB
ALBUMIN SERPL-MCNC: 4.6 G/DL (ref 3.5–5.2)
ALBUMIN/GLOB SERPL: 1.6 G/DL
ALP SERPL-CCNC: 92 U/L (ref 39–117)
ALT SERPL W P-5'-P-CCNC: 25 U/L (ref 1–33)
ANION GAP SERPL CALCULATED.3IONS-SCNC: 10 MMOL/L (ref 5–15)
AST SERPL-CCNC: 17 U/L (ref 1–32)
BASOPHILS # BLD AUTO: 0.05 10*3/MM3 (ref 0–0.2)
BASOPHILS NFR BLD AUTO: 0.8 % (ref 0–1.5)
BILIRUB SERPL-MCNC: 0.3 MG/DL (ref 0–1.2)
BUN SERPL-MCNC: 17 MG/DL (ref 6–20)
BUN/CREAT SERPL: 20.2 (ref 7–25)
CALCIUM SPEC-SCNC: 9.7 MG/DL (ref 8.6–10.5)
CHLORIDE SERPL-SCNC: 100 MMOL/L (ref 98–107)
CHOLEST SERPL-MCNC: 229 MG/DL (ref 0–200)
CO2 SERPL-SCNC: 28 MMOL/L (ref 22–29)
CREAT SERPL-MCNC: 0.84 MG/DL (ref 0.57–1)
DEPRECATED RDW RBC AUTO: 40 FL (ref 37–54)
EGFRCR SERPLBLD CKD-EPI 2021: 84.8 ML/MIN/1.73
EOSINOPHIL # BLD AUTO: 0.15 10*3/MM3 (ref 0–0.4)
EOSINOPHIL NFR BLD AUTO: 2.4 % (ref 0.3–6.2)
ERYTHROCYTE [DISTWIDTH] IN BLOOD BY AUTOMATED COUNT: 12.1 % (ref 12.3–15.4)
GLOBULIN UR ELPH-MCNC: 2.9 GM/DL
GLUCOSE SERPL-MCNC: 104 MG/DL (ref 65–99)
HCT VFR BLD AUTO: 41.2 % (ref 34–46.6)
HDLC SERPL-MCNC: 60 MG/DL (ref 40–60)
HGB BLD-MCNC: 14.4 G/DL (ref 12–15.9)
IMM GRANULOCYTES # BLD AUTO: 0.03 10*3/MM3 (ref 0–0.05)
IMM GRANULOCYTES NFR BLD AUTO: 0.5 % (ref 0–0.5)
LDLC SERPL CALC-MCNC: 135 MG/DL (ref 0–100)
LDLC/HDLC SERPL: 2.18 {RATIO}
LYMPHOCYTES # BLD AUTO: 2.09 10*3/MM3 (ref 0.7–3.1)
LYMPHOCYTES NFR BLD AUTO: 33 % (ref 19.6–45.3)
MCH RBC QN AUTO: 31.1 PG (ref 26.6–33)
MCHC RBC AUTO-ENTMCNC: 35 G/DL (ref 31.5–35.7)
MCV RBC AUTO: 89 FL (ref 79–97)
MONOCYTES # BLD AUTO: 0.52 10*3/MM3 (ref 0.1–0.9)
MONOCYTES NFR BLD AUTO: 8.2 % (ref 5–12)
NEUTROPHILS NFR BLD AUTO: 3.5 10*3/MM3 (ref 1.7–7)
NEUTROPHILS NFR BLD AUTO: 55.1 % (ref 42.7–76)
NRBC BLD AUTO-RTO: 0.2 /100 WBC (ref 0–0.2)
PLATELET # BLD AUTO: 294 10*3/MM3 (ref 140–450)
PMV BLD AUTO: 11.1 FL (ref 6–12)
POTASSIUM SERPL-SCNC: 4.1 MMOL/L (ref 3.5–5.2)
PROT SERPL-MCNC: 7.5 G/DL (ref 6–8.5)
RBC # BLD AUTO: 4.63 10*6/MM3 (ref 3.77–5.28)
SODIUM SERPL-SCNC: 138 MMOL/L (ref 136–145)
TRIGL SERPL-MCNC: 192 MG/DL (ref 0–150)
VLDLC SERPL-MCNC: 34 MG/DL (ref 5–40)
WBC NRBC COR # BLD: 6.34 10*3/MM3 (ref 3.4–10.8)

## 2022-09-09 PROCEDURE — 80053 COMPREHEN METABOLIC PANEL: CPT | Performed by: NURSE PRACTITIONER

## 2022-09-09 PROCEDURE — 80061 LIPID PANEL: CPT | Performed by: NURSE PRACTITIONER

## 2022-09-09 PROCEDURE — 36415 COLL VENOUS BLD VENIPUNCTURE: CPT

## 2022-09-09 PROCEDURE — 85025 COMPLETE CBC W/AUTO DIFF WBC: CPT | Performed by: NURSE PRACTITIONER

## 2022-09-09 PROCEDURE — 99214 OFFICE O/P EST MOD 30 MIN: CPT | Performed by: NURSE PRACTITIONER

## 2022-09-09 RX ORDER — HYDROXYZINE HYDROCHLORIDE 25 MG/1
25 TABLET, FILM COATED ORAL 3 TIMES DAILY PRN
Qty: 30 TABLET | Refills: 1 | Status: SHIPPED | OUTPATIENT
Start: 2022-09-09 | End: 2023-04-01

## 2022-09-09 NOTE — PROGRESS NOTES
Subjective   Itzel Hogan is a 51 y.o. female.       HPI   Pt is here today for 6 month follow up.   1) HTN - currently on lisinopril 10 mg daily.  BP stable.  Denies any CP; palpations; SOA; dizziness; headache; trouble with vision.    2) Hyperlipidemia - not currently on medication. Concerned because her mother has recently had heart surgery and she doesn't want to go thru this.   3) GERD - currently on omeprazole 20 mg daily.   4) Anxiety - has had more stress at home recently also dealing with changing hormones with menopause.  Feels moods are down at times but she feels anxious; worrying more.  Denies any SI or HI. Really doesn't want to be on medication.     The following portions of the patient's history were reviewed and updated as appropriate: allergies, current medications, past family history, past medical history, past social history, past surgical history and problem list.    Review of Systems   Constitutional: Negative for chills, fatigue and fever.   Respiratory: Negative for cough, chest tightness, shortness of breath and wheezing.    Cardiovascular: Negative for chest pain and palpitations.   Gastrointestinal: Negative for diarrhea, nausea and vomiting.   Genitourinary: Negative for dysuria, flank pain, frequency and urgency.   Neurological: Negative for dizziness, weakness and headache.   Psychiatric/Behavioral: Positive for stress. Negative for self-injury, suicidal ideas and depressed mood. The patient is nervous/anxious.        Objective   Physical Exam  Vitals reviewed.   Constitutional:       General: She is not in acute distress.     Appearance: Normal appearance.   Cardiovascular:      Rate and Rhythm: Normal rate and regular rhythm.      Pulses: Normal pulses.      Heart sounds: Normal heart sounds. No murmur heard.  Pulmonary:      Effort: Pulmonary effort is normal. No respiratory distress.      Breath sounds: Normal breath sounds. No wheezing or rhonchi.   Chest:      Chest wall: No  tenderness.   Abdominal:      Tenderness: There is no right CVA tenderness or left CVA tenderness.   Musculoskeletal:      Right lower leg: No edema.      Left lower leg: No edema.   Skin:     General: Skin is warm and dry.      Findings: No erythema.   Neurological:      General: No focal deficit present.      Mental Status: She is alert and oriented to person, place, and time.   Psychiatric:         Mood and Affect: Mood normal.           Assessment & Plan   Diagnoses and all orders for this visit:    1. Essential hypertension (Primary)  Comments:  Stable.   Cont current medication.   Labs ordered.   RTO in 6 mo.   Orders:  -     Comprehensive metabolic panel; Future  -     Lipid panel; Future  -     CBC w AUTO Differential; Future    2. Mixed hyperlipidemia  Comments:  Stable.   Cont current medication.   Labs ordered.   RTO in 6 mo.  Orders:  -     Comprehensive metabolic panel; Future  -     Lipid panel; Future  -     CBC w AUTO Differential; Future    3. Gastroesophageal reflux disease, unspecified whether esophagitis present  Comments:  Stable.   Cont current medication.   Labs ordered.   RTo in 6 mo.  Orders:  -     Comprehensive metabolic panel; Future  -     Lipid panel; Future  -     CBC w AUTO Differential; Future    4. Anxiety  Comments:  Given hydroxyzine 25 mg tid PRN   Call for worsening.   Orders:  -     hydrOXYzine (ATARAX) 25 MG tablet; Take 1 tablet by mouth 3 (Three) Times a Day As Needed for Itching.  Dispense: 30 tablet; Refill: 1

## 2022-09-09 NOTE — PATIENT INSTRUCTIONS
Just ingredients (website)    [employer] (dressings and [employer] options)  [employer] (dressings)

## 2022-09-11 DIAGNOSIS — E78.2 MIXED HYPERLIPIDEMIA: Primary | ICD-10-CM

## 2022-09-11 RX ORDER — ATORVASTATIN CALCIUM 10 MG/1
10 TABLET, FILM COATED ORAL DAILY
Qty: 90 TABLET | Refills: 1 | Status: SHIPPED | OUTPATIENT
Start: 2022-09-11 | End: 2022-10-27 | Stop reason: SDUPTHER

## 2022-10-27 DIAGNOSIS — K21.9 GASTROESOPHAGEAL REFLUX DISEASE, UNSPECIFIED WHETHER ESOPHAGITIS PRESENT: ICD-10-CM

## 2022-10-27 DIAGNOSIS — E78.2 MIXED HYPERLIPIDEMIA: ICD-10-CM

## 2022-10-27 RX ORDER — ATORVASTATIN CALCIUM 10 MG/1
10 TABLET, FILM COATED ORAL DAILY
Qty: 90 TABLET | Refills: 1 | Status: SHIPPED | OUTPATIENT
Start: 2022-10-27

## 2022-10-27 RX ORDER — OMEPRAZOLE 20 MG/1
20 CAPSULE, DELAYED RELEASE ORAL DAILY
Qty: 90 CAPSULE | Refills: 1 | Status: SHIPPED | OUTPATIENT
Start: 2022-10-27

## 2023-02-20 DIAGNOSIS — I10 ESSENTIAL HYPERTENSION: ICD-10-CM

## 2023-02-20 RX ORDER — LISINOPRIL 10 MG/1
TABLET ORAL
Qty: 90 TABLET | Refills: 3 | Status: SHIPPED | OUTPATIENT
Start: 2023-02-20

## 2023-03-27 ENCOUNTER — OFFICE VISIT (OUTPATIENT)
Dept: FAMILY MEDICINE CLINIC | Facility: CLINIC | Age: 53
End: 2023-03-27
Payer: COMMERCIAL

## 2023-03-27 ENCOUNTER — LAB (OUTPATIENT)
Dept: FAMILY MEDICINE CLINIC | Facility: CLINIC | Age: 53
End: 2023-03-27
Payer: COMMERCIAL

## 2023-03-27 VITALS
HEART RATE: 85 BPM | BODY MASS INDEX: 34.23 KG/M2 | OXYGEN SATURATION: 100 % | DIASTOLIC BLOOD PRESSURE: 84 MMHG | SYSTOLIC BLOOD PRESSURE: 122 MMHG | WEIGHT: 186 LBS | HEIGHT: 62 IN

## 2023-03-27 DIAGNOSIS — F41.9 ANXIETY: ICD-10-CM

## 2023-03-27 DIAGNOSIS — E78.2 MIXED HYPERLIPIDEMIA: ICD-10-CM

## 2023-03-27 DIAGNOSIS — K21.9 GASTROESOPHAGEAL REFLUX DISEASE, UNSPECIFIED WHETHER ESOPHAGITIS PRESENT: ICD-10-CM

## 2023-03-27 DIAGNOSIS — I10 ESSENTIAL HYPERTENSION: ICD-10-CM

## 2023-03-27 DIAGNOSIS — I10 ESSENTIAL HYPERTENSION: Primary | ICD-10-CM

## 2023-03-27 LAB
ALBUMIN SERPL-MCNC: 4.4 G/DL (ref 3.5–5.2)
ALBUMIN/GLOB SERPL: 2 G/DL
ALP SERPL-CCNC: 94 U/L (ref 39–117)
ALT SERPL W P-5'-P-CCNC: 29 U/L (ref 1–33)
ANION GAP SERPL CALCULATED.3IONS-SCNC: 11.2 MMOL/L (ref 5–15)
AST SERPL-CCNC: 24 U/L (ref 1–32)
BILIRUB SERPL-MCNC: 0.5 MG/DL (ref 0–1.2)
BUN SERPL-MCNC: 12 MG/DL (ref 6–20)
BUN/CREAT SERPL: 17.6 (ref 7–25)
CALCIUM SPEC-SCNC: 9.1 MG/DL (ref 8.6–10.5)
CHLORIDE SERPL-SCNC: 100 MMOL/L (ref 98–107)
CHOLEST SERPL-MCNC: 143 MG/DL (ref 0–200)
CO2 SERPL-SCNC: 24.8 MMOL/L (ref 22–29)
CREAT SERPL-MCNC: 0.68 MG/DL (ref 0.57–1)
EGFRCR SERPLBLD CKD-EPI 2021: 105.6 ML/MIN/1.73
GLOBULIN UR ELPH-MCNC: 2.2 GM/DL
GLUCOSE SERPL-MCNC: 97 MG/DL (ref 65–99)
HDLC SERPL-MCNC: 48 MG/DL (ref 40–60)
LDLC SERPL CALC-MCNC: 72 MG/DL (ref 0–100)
LDLC/HDLC SERPL: 1.44 {RATIO}
POTASSIUM SERPL-SCNC: 3.9 MMOL/L (ref 3.5–5.2)
PROT SERPL-MCNC: 6.6 G/DL (ref 6–8.5)
SODIUM SERPL-SCNC: 136 MMOL/L (ref 136–145)
TRIGL SERPL-MCNC: 129 MG/DL (ref 0–150)
VLDLC SERPL-MCNC: 23 MG/DL (ref 5–40)

## 2023-03-27 PROCEDURE — 36415 COLL VENOUS BLD VENIPUNCTURE: CPT

## 2023-03-27 PROCEDURE — 80061 LIPID PANEL: CPT | Performed by: NURSE PRACTITIONER

## 2023-03-27 PROCEDURE — 99214 OFFICE O/P EST MOD 30 MIN: CPT | Performed by: NURSE PRACTITIONER

## 2023-03-27 PROCEDURE — 80053 COMPREHEN METABOLIC PANEL: CPT | Performed by: NURSE PRACTITIONER

## 2023-04-01 ENCOUNTER — HOSPITAL ENCOUNTER (OUTPATIENT)
Facility: HOSPITAL | Age: 53
Setting detail: OBSERVATION
Discharge: HOME | End: 2023-04-02
Attending: EMERGENCY MEDICINE | Admitting: EMERGENCY MEDICINE
Payer: COMMERCIAL

## 2023-04-01 ENCOUNTER — APPOINTMENT (OUTPATIENT)
Dept: CT IMAGING | Facility: HOSPITAL | Age: 53
End: 2023-04-01
Payer: COMMERCIAL

## 2023-04-01 DIAGNOSIS — K57.92 ACUTE DIVERTICULITIS: Primary | ICD-10-CM

## 2023-04-01 DIAGNOSIS — R10.32 LEFT LOWER QUADRANT PAIN: ICD-10-CM

## 2023-04-01 LAB
ANION GAP SERPL CALCULATED.3IONS-SCNC: 10 MMOL/L (ref 5–15)
B-HCG UR QL: NEGATIVE
BACTERIA UR QL AUTO: ABNORMAL /HPF
BASOPHILS # BLD AUTO: 0 10*3/MM3 (ref 0–0.2)
BASOPHILS NFR BLD AUTO: 0.5 % (ref 0–1.5)
BILIRUB UR QL STRIP: NEGATIVE
BUN SERPL-MCNC: 14 MG/DL (ref 6–20)
BUN/CREAT SERPL: 21.5 (ref 7–25)
CALCIUM SPEC-SCNC: 9.8 MG/DL (ref 8.6–10.5)
CHLORIDE SERPL-SCNC: 99 MMOL/L (ref 98–107)
CLARITY UR: CLEAR
CO2 SERPL-SCNC: 28 MMOL/L (ref 22–29)
COLOR UR: YELLOW
CREAT SERPL-MCNC: 0.65 MG/DL (ref 0.57–1)
DEPRECATED RDW RBC AUTO: 42.9 FL (ref 37–54)
EGFRCR SERPLBLD CKD-EPI 2021: 106.7 ML/MIN/1.73
EOSINOPHIL # BLD AUTO: 0 10*3/MM3 (ref 0–0.4)
EOSINOPHIL NFR BLD AUTO: 0.4 % (ref 0.3–6.2)
ERYTHROCYTE [DISTWIDTH] IN BLOOD BY AUTOMATED COUNT: 13.5 % (ref 12.3–15.4)
GLUCOSE SERPL-MCNC: 100 MG/DL (ref 65–99)
GLUCOSE UR STRIP-MCNC: NEGATIVE MG/DL
HCT VFR BLD AUTO: 45 % (ref 34–46.6)
HGB BLD-MCNC: 14.7 G/DL (ref 12–15.9)
HGB UR QL STRIP.AUTO: ABNORMAL
HYALINE CASTS UR QL AUTO: ABNORMAL /LPF
KETONES UR QL STRIP: NEGATIVE
LEUKOCYTE ESTERASE UR QL STRIP.AUTO: NEGATIVE
LYMPHOCYTES # BLD AUTO: 1 10*3/MM3 (ref 0.7–3.1)
LYMPHOCYTES NFR BLD AUTO: 9.6 % (ref 19.6–45.3)
MCH RBC QN AUTO: 30 PG (ref 26.6–33)
MCHC RBC AUTO-ENTMCNC: 32.6 G/DL (ref 31.5–35.7)
MCV RBC AUTO: 91.8 FL (ref 79–97)
MONOCYTES # BLD AUTO: 0.8 10*3/MM3 (ref 0.1–0.9)
MONOCYTES NFR BLD AUTO: 7.6 % (ref 5–12)
NEUTROPHILS NFR BLD AUTO: 81.9 % (ref 42.7–76)
NEUTROPHILS NFR BLD AUTO: 9 10*3/MM3 (ref 1.7–7)
NITRITE UR QL STRIP: NEGATIVE
NRBC BLD AUTO-RTO: 0 /100 WBC (ref 0–0.2)
PH UR STRIP.AUTO: 8 [PH] (ref 5–8)
PLATELET # BLD AUTO: 260 10*3/MM3 (ref 140–450)
PMV BLD AUTO: 9.1 FL (ref 6–12)
POTASSIUM SERPL-SCNC: 4.5 MMOL/L (ref 3.5–5.2)
PROT UR QL STRIP: NEGATIVE
RBC # BLD AUTO: 4.9 10*6/MM3 (ref 3.77–5.28)
RBC # UR STRIP: ABNORMAL /HPF
REF LAB TEST METHOD: ABNORMAL
SODIUM SERPL-SCNC: 137 MMOL/L (ref 136–145)
SP GR UR STRIP: 1.01 (ref 1–1.03)
SQUAMOUS #/AREA URNS HPF: ABNORMAL /HPF
UROBILINOGEN UR QL STRIP: ABNORMAL
WBC # UR STRIP: ABNORMAL /HPF
WBC NRBC COR # BLD: 10.9 10*3/MM3 (ref 3.4–10.8)

## 2023-04-01 PROCEDURE — 99284 EMERGENCY DEPT VISIT MOD MDM: CPT

## 2023-04-01 PROCEDURE — G0378 HOSPITAL OBSERVATION PER HR: HCPCS

## 2023-04-01 PROCEDURE — 96375 TX/PRO/DX INJ NEW DRUG ADDON: CPT

## 2023-04-01 PROCEDURE — 80048 BASIC METABOLIC PNL TOTAL CA: CPT | Performed by: EMERGENCY MEDICINE

## 2023-04-01 PROCEDURE — 81001 URINALYSIS AUTO W/SCOPE: CPT | Performed by: EMERGENCY MEDICINE

## 2023-04-01 PROCEDURE — 74176 CT ABD & PELVIS W/O CONTRAST: CPT

## 2023-04-01 PROCEDURE — 25010000002 LEVOFLOXACIN PER 250 MG: Performed by: EMERGENCY MEDICINE

## 2023-04-01 PROCEDURE — 96368 THER/DIAG CONCURRENT INF: CPT

## 2023-04-01 PROCEDURE — 96366 THER/PROPH/DIAG IV INF ADDON: CPT

## 2023-04-01 PROCEDURE — 25010000002 HYDROMORPHONE 1 MG/ML SOLUTION: Performed by: EMERGENCY MEDICINE

## 2023-04-01 PROCEDURE — 96361 HYDRATE IV INFUSION ADD-ON: CPT

## 2023-04-01 PROCEDURE — 96365 THER/PROPH/DIAG IV INF INIT: CPT

## 2023-04-01 PROCEDURE — 25010000002 ONDANSETRON PER 1 MG: Performed by: EMERGENCY MEDICINE

## 2023-04-01 PROCEDURE — 81025 URINE PREGNANCY TEST: CPT | Performed by: EMERGENCY MEDICINE

## 2023-04-01 PROCEDURE — 85025 COMPLETE CBC W/AUTO DIFF WBC: CPT | Performed by: EMERGENCY MEDICINE

## 2023-04-01 RX ORDER — ONDANSETRON 2 MG/ML
4 INJECTION INTRAMUSCULAR; INTRAVENOUS EVERY 6 HOURS PRN
Status: DISCONTINUED | OUTPATIENT
Start: 2023-04-01 | End: 2023-04-02 | Stop reason: HOSPADM

## 2023-04-01 RX ORDER — SODIUM CHLORIDE 9 MG/ML
40 INJECTION, SOLUTION INTRAVENOUS AS NEEDED
Status: DISCONTINUED | OUTPATIENT
Start: 2023-04-01 | End: 2023-04-02 | Stop reason: HOSPADM

## 2023-04-01 RX ORDER — METRONIDAZOLE 500 MG/100ML
500 INJECTION, SOLUTION INTRAVENOUS EVERY 8 HOURS
Status: DISCONTINUED | OUTPATIENT
Start: 2023-04-01 | End: 2023-04-02 | Stop reason: HOSPADM

## 2023-04-01 RX ORDER — ONDANSETRON 2 MG/ML
4 INJECTION INTRAMUSCULAR; INTRAVENOUS ONCE
Status: COMPLETED | OUTPATIENT
Start: 2023-04-01 | End: 2023-04-01

## 2023-04-01 RX ORDER — ACETAMINOPHEN 325 MG/1
650 TABLET ORAL EVERY 4 HOURS PRN
Status: DISCONTINUED | OUTPATIENT
Start: 2023-04-01 | End: 2023-04-02 | Stop reason: HOSPADM

## 2023-04-01 RX ORDER — AMOXICILLIN 250 MG
2 CAPSULE ORAL 2 TIMES DAILY
Status: DISCONTINUED | OUTPATIENT
Start: 2023-04-01 | End: 2023-04-02 | Stop reason: HOSPADM

## 2023-04-01 RX ORDER — LEVOFLOXACIN 5 MG/ML
750 INJECTION, SOLUTION INTRAVENOUS EVERY 24 HOURS
Status: DISCONTINUED | OUTPATIENT
Start: 2023-04-02 | End: 2023-04-02 | Stop reason: HOSPADM

## 2023-04-01 RX ORDER — SODIUM CHLORIDE, SODIUM LACTATE, POTASSIUM CHLORIDE, CALCIUM CHLORIDE 600; 310; 30; 20 MG/100ML; MG/100ML; MG/100ML; MG/100ML
100 INJECTION, SOLUTION INTRAVENOUS CONTINUOUS
Status: DISCONTINUED | OUTPATIENT
Start: 2023-04-01 | End: 2023-04-02 | Stop reason: HOSPADM

## 2023-04-01 RX ORDER — SODIUM CHLORIDE 0.9 % (FLUSH) 0.9 %
10 SYRINGE (ML) INJECTION AS NEEDED
Status: DISCONTINUED | OUTPATIENT
Start: 2023-04-01 | End: 2023-04-02 | Stop reason: HOSPADM

## 2023-04-01 RX ORDER — LEVOFLOXACIN 5 MG/ML
750 INJECTION, SOLUTION INTRAVENOUS ONCE
Status: COMPLETED | OUTPATIENT
Start: 2023-04-01 | End: 2023-04-01

## 2023-04-01 RX ORDER — AZELASTINE 1 MG/ML
2 SPRAY, METERED NASAL 2 TIMES DAILY PRN
Status: DISCONTINUED | OUTPATIENT
Start: 2023-04-01 | End: 2023-04-02 | Stop reason: HOSPADM

## 2023-04-01 RX ORDER — POLYETHYLENE GLYCOL 3350 17 G/17G
17 POWDER, FOR SOLUTION ORAL DAILY PRN
Status: DISCONTINUED | OUTPATIENT
Start: 2023-04-01 | End: 2023-04-02 | Stop reason: HOSPADM

## 2023-04-01 RX ORDER — ONDANSETRON 4 MG/1
4 TABLET, FILM COATED ORAL EVERY 6 HOURS PRN
Status: DISCONTINUED | OUTPATIENT
Start: 2023-04-01 | End: 2023-04-02 | Stop reason: HOSPADM

## 2023-04-01 RX ORDER — CA/D3/MAG OX/ZINC/COP/MANG/BOR 600 MG-800
1 TABLET,CHEWABLE ORAL DAILY
COMMUNITY

## 2023-04-01 RX ORDER — SODIUM CHLORIDE 0.9 % (FLUSH) 0.9 %
10 SYRINGE (ML) INJECTION EVERY 12 HOURS SCHEDULED
Status: DISCONTINUED | OUTPATIENT
Start: 2023-04-01 | End: 2023-04-02 | Stop reason: HOSPADM

## 2023-04-01 RX ORDER — BISACODYL 5 MG/1
5 TABLET, DELAYED RELEASE ORAL DAILY PRN
Status: DISCONTINUED | OUTPATIENT
Start: 2023-04-01 | End: 2023-04-02 | Stop reason: HOSPADM

## 2023-04-01 RX ORDER — TRIAMCINOLONE ACETONIDE 55 UG/1
2 SPRAY, METERED NASAL 2 TIMES DAILY PRN
COMMUNITY
End: 2023-04-05

## 2023-04-01 RX ORDER — METRONIDAZOLE 500 MG/100ML
500 INJECTION, SOLUTION INTRAVENOUS ONCE
Status: COMPLETED | OUTPATIENT
Start: 2023-04-01 | End: 2023-04-01

## 2023-04-01 RX ORDER — BISACODYL 10 MG
10 SUPPOSITORY, RECTAL RECTAL DAILY PRN
Status: DISCONTINUED | OUTPATIENT
Start: 2023-04-01 | End: 2023-04-02 | Stop reason: HOSPADM

## 2023-04-01 RX ADMIN — METRONIDAZOLE 500 MG: 500 INJECTION, SOLUTION INTRAVENOUS at 15:24

## 2023-04-01 RX ADMIN — ONDANSETRON 4 MG: 2 INJECTION INTRAMUSCULAR; INTRAVENOUS at 16:28

## 2023-04-01 RX ADMIN — LEVOFLOXACIN 750 MG: 5 INJECTION, SOLUTION INTRAVENOUS at 15:24

## 2023-04-01 RX ADMIN — ACETAMINOPHEN 650 MG: 325 TABLET, FILM COATED ORAL at 20:48

## 2023-04-01 RX ADMIN — SODIUM CHLORIDE, POTASSIUM CHLORIDE, SODIUM LACTATE AND CALCIUM CHLORIDE 100 ML/HR: 600; 310; 30; 20 INJECTION, SOLUTION INTRAVENOUS at 17:08

## 2023-04-01 RX ADMIN — HYDROMORPHONE HYDROCHLORIDE 0.5 MG: 1 INJECTION, SOLUTION INTRAMUSCULAR; INTRAVENOUS; SUBCUTANEOUS at 16:29

## 2023-04-01 NOTE — ED PROVIDER NOTES
Subjective   History of Present Illness  52-year-old female presents with left lower abdominal pain.  She started with symptoms last night.  She has had some nausea but no vomiting.  She states her bowels have been a little loose.  She reports no change in urination.  She states she has had some vaginal bleeding since last Wednesday.  She states she is perimenopausal and periods are irregular.  She has had no fever no cough no shortness of breath.  She has had no previous abdominal surgeries.  Review of Systems    Past Medical History:   Diagnosis Date   • Allergic    • Allergies    • Anemia    • B12 deficiency    • Hyperlipidemia    • Hypertension    • Migraine        Allergies   Allergen Reactions   • Penicillins Hives   • Prochlorperazine Dizziness   • Stadol [Butorphanol] Dizziness   • Cephalexin Hives       Past Surgical History:   Procedure Laterality Date   • CARPAL TUNNEL RELEASE Bilateral    • COLONOSCOPY     • SINUS SURGERY     • TONSILLECTOMY     • WISDOM TOOTH EXTRACTION         Family History   Problem Relation Age of Onset   • Stroke Mother    • Heart failure Mother    • Cancer Mother    • Asthma Mother    • Hyperlipidemia Mother    • Hypertension Mother    • Skin cancer Father    • Anemia Father    • Asthma Sister    • Heart attack Maternal Grandmother    • Heart attack Maternal Grandfather        Social History     Socioeconomic History   • Marital status:    Tobacco Use   • Smoking status: Never   • Smokeless tobacco: Never   Vaping Use   • Vaping Use: Never used   Substance and Sexual Activity   • Alcohol use: Not Currently     Comment: occasionally   • Drug use: Never   • Sexual activity: Yes     Partners: Male     Birth control/protection: Condom     Current medications Prilosec Lipitor lisinopril and a probiotic      Objective   Physical Exam  52-year-old female awake alert.  Generally well-developed well-nourished.  Chest clear equal breath sounds cardiovascular regular rate and rhythm.   She has no CVA tenderness.  Abdomen is soft positive bowel sounds with some left lower abdominal tenderness without involuntary guarding or rebound.   Skin without rash noted.  Procedures           ED Course      Results for orders placed or performed during the hospital encounter of 04/01/23   Basic Metabolic Panel    Specimen: Blood   Result Value Ref Range    Glucose 100 (H) 65 - 99 mg/dL    BUN 14 6 - 20 mg/dL    Creatinine 0.65 0.57 - 1.00 mg/dL    Sodium 137 136 - 145 mmol/L    Potassium 4.5 3.5 - 5.2 mmol/L    Chloride 99 98 - 107 mmol/L    CO2 28.0 22.0 - 29.0 mmol/L    Calcium 9.8 8.6 - 10.5 mg/dL    BUN/Creatinine Ratio 21.5 7.0 - 25.0    Anion Gap 10.0 5.0 - 15.0 mmol/L    eGFR 106.7 >60.0 mL/min/1.73   Pregnancy, Urine - Urine, Clean Catch    Specimen: Urine, Clean Catch   Result Value Ref Range    HCG, Urine QL Negative Negative   Urinalysis With Microscopic If Indicated (No Culture) - Urine, Clean Catch    Specimen: Urine, Clean Catch   Result Value Ref Range    Color, UA Yellow Yellow, Straw    Appearance, UA Clear Clear    pH, UA 8.0 5.0 - 8.0    Specific Gravity, UA 1.009 1.005 - 1.030    Glucose, UA Negative Negative    Ketones, UA Negative Negative    Bilirubin, UA Negative Negative    Blood, UA Large (3+) (A) Negative    Protein, UA Negative Negative    Leuk Esterase, UA Negative Negative    Nitrite, UA Negative Negative    Urobilinogen, UA 0.2 E.U./dL 0.2 - 1.0 E.U./dL   CBC Auto Differential    Specimen: Blood   Result Value Ref Range    WBC 10.90 (H) 3.40 - 10.80 10*3/mm3    RBC 4.90 3.77 - 5.28 10*6/mm3    Hemoglobin 14.7 12.0 - 15.9 g/dL    Hematocrit 45.0 34.0 - 46.6 %    MCV 91.8 79.0 - 97.0 fL    MCH 30.0 26.6 - 33.0 pg    MCHC 32.6 31.5 - 35.7 g/dL    RDW 13.5 12.3 - 15.4 %    RDW-SD 42.9 37.0 - 54.0 fl    MPV 9.1 6.0 - 12.0 fL    Platelets 260 140 - 450 10*3/mm3    Neutrophil % 81.9 (H) 42.7 - 76.0 %    Lymphocyte % 9.6 (L) 19.6 - 45.3 %    Monocyte % 7.6 5.0 - 12.0 %    Eosinophil %  "0.4 0.3 - 6.2 %    Basophil % 0.5 0.0 - 1.5 %    Neutrophils, Absolute 9.00 (H) 1.70 - 7.00 10*3/mm3    Lymphocytes, Absolute 1.00 0.70 - 3.10 10*3/mm3    Monocytes, Absolute 0.80 0.10 - 0.90 10*3/mm3    Eosinophils, Absolute 0.00 0.00 - 0.40 10*3/mm3    Basophils, Absolute 0.00 0.00 - 0.20 10*3/mm3    nRBC 0.0 0.0 - 0.2 /100 WBC   Urinalysis, Microscopic Only - Urine, Clean Catch    Specimen: Urine, Clean Catch   Result Value Ref Range    RBC, UA Too Numerous to Count (A) None Seen /HPF    WBC, UA 0-2 (A) None Seen /HPF    Bacteria, UA None Seen None Seen /HPF    Squamous Epithelial Cells, UA 0-2 None Seen, 0-2 /HPF    Hyaline Casts, UA None Seen None Seen /LPF    Methodology Automated Microscopy      CT Abdomen Pelvis Without Contrast    Result Date: 4/1/2023  1.There is severe diverticulitis of the sigmoid descending colon junction. No perforation or extraluminal abscess. I would recommend a follow-up CT and/or colonoscopy once acute symptoms have resolved to exclude the possibility of underlying mass causing  wall thickening this location. 2.Small hiatal hernia. Electronically Signed: Santa Kohli  4/1/2023 2:18 PM EDT  Workstation ID: [id number]    Medications   sodium chloride 0.9 % flush 10 mL (has no administration in time range)   metroNIDAZOLE (FLAGYL) IVPB 500 mg (500 mg Intravenous Incomplete 4/1/23 1524)   levoFLOXacin (LEVAQUIN) 750 mg/150 mL D5W (premix) (LEVAQUIN) 750 mg (750 mg Intravenous Incomplete 4/1/23 1524)     /81   Pulse 105   Temp 98.2 °F (36.8 °C) (Oral)   Resp 18   Ht 157.5 cm (62\")   Wt 84.6 kg (186 lb 8.2 oz)   SpO2 100%   BMI 34.11 kg/m²                                        MDM  Chart review: Patient had office visit on the 27th of last month for hypertension hyperlipidemia reflux and anxiety  Comorbidity: As per past history   Differential: UTI, ureterolithiasis, diverticulitis, ovarian cyst, pregnancy  My EKG interpretation: Not indicated  Lab: Urine hCG negative " urinalysis TNTC red blood cells but only 0-2 white cells consistent with menses.  Patient metabolic panel normal glucose 100 CBC white count 10.9 with 81 segs no bands on differential  My Radiology review and interpretation: CT scan abdomen pelvis reveals evidence of diverticulitis of the sigmoid:.  There is no evidence of perforation or abscess.  There is noted to be wall thickening of the colon at the descending sigmoid colon junction.  Discussion/treatment: Patient had IV placed.  She was placed on Levaquin and Flagyl due to developing hives after penicillin and Keflex.  Disposition was discussed with her after discussion was elected to bring her in for observation for IV antibiotics.  Patient replaced in observation for continued care.  Patient was given Dilaudid and Zofran for pain and nausea  Patient was evaluated using appropriate PPE      Final diagnoses:   Acute diverticulitis   Left lower quadrant pain       ED Disposition  ED Disposition     ED Disposition   Decision to Admit    Condition   --    Comment   --             No follow-up provider specified.       Medication List      No changes were made to your prescriptions during this visit.          Chester Guillory MD  04/01/23 1526       Chester Guillory MD  04/01/23 1527       Chester Guillory MD  04/01/23 1548

## 2023-04-01 NOTE — LETTER
April 2, 2023     Patient: Itzel Hogan   YOB: 1970   Date of Visit: 4/1/2023       To Whom It May Concern:    It is my medical opinion that Itzel Hogan may return to work 4/5/23. She may not lift any items weighing more than 5lbs until 4/7/23.           Sincerely,     Chester Guillory MD

## 2023-04-02 ENCOUNTER — READMISSION MANAGEMENT (OUTPATIENT)
Dept: CALL CENTER | Facility: HOSPITAL | Age: 53
End: 2023-04-02
Payer: COMMERCIAL

## 2023-04-02 VITALS
DIASTOLIC BLOOD PRESSURE: 66 MMHG | TEMPERATURE: 97.9 F | SYSTOLIC BLOOD PRESSURE: 102 MMHG | BODY MASS INDEX: 34.28 KG/M2 | HEART RATE: 73 BPM | OXYGEN SATURATION: 99 % | HEIGHT: 62 IN | RESPIRATION RATE: 18 BRPM | WEIGHT: 186.29 LBS

## 2023-04-02 LAB
ANION GAP SERPL CALCULATED.3IONS-SCNC: 8 MMOL/L (ref 5–15)
BASOPHILS # BLD AUTO: 0.1 10*3/MM3 (ref 0–0.2)
BASOPHILS NFR BLD AUTO: 0.6 % (ref 0–1.5)
BUN SERPL-MCNC: 10 MG/DL (ref 6–20)
BUN/CREAT SERPL: 14.7 (ref 7–25)
CALCIUM SPEC-SCNC: 9 MG/DL (ref 8.6–10.5)
CHLORIDE SERPL-SCNC: 100 MMOL/L (ref 98–107)
CO2 SERPL-SCNC: 29 MMOL/L (ref 22–29)
CREAT SERPL-MCNC: 0.68 MG/DL (ref 0.57–1)
DEPRECATED RDW RBC AUTO: 44.6 FL (ref 37–54)
EGFRCR SERPLBLD CKD-EPI 2021: 105.6 ML/MIN/1.73
EOSINOPHIL # BLD AUTO: 0.1 10*3/MM3 (ref 0–0.4)
EOSINOPHIL NFR BLD AUTO: 0.9 % (ref 0.3–6.2)
ERYTHROCYTE [DISTWIDTH] IN BLOOD BY AUTOMATED COUNT: 13.4 % (ref 12.3–15.4)
GLUCOSE SERPL-MCNC: 110 MG/DL (ref 65–99)
HCT VFR BLD AUTO: 41.4 % (ref 34–46.6)
HGB BLD-MCNC: 14 G/DL (ref 12–15.9)
LYMPHOCYTES # BLD AUTO: 1.8 10*3/MM3 (ref 0.7–3.1)
LYMPHOCYTES NFR BLD AUTO: 17.3 % (ref 19.6–45.3)
MCH RBC QN AUTO: 30.8 PG (ref 26.6–33)
MCHC RBC AUTO-ENTMCNC: 33.8 G/DL (ref 31.5–35.7)
MCV RBC AUTO: 91 FL (ref 79–97)
MONOCYTES # BLD AUTO: 0.8 10*3/MM3 (ref 0.1–0.9)
MONOCYTES NFR BLD AUTO: 7.7 % (ref 5–12)
NEUTROPHILS NFR BLD AUTO: 7.5 10*3/MM3 (ref 1.7–7)
NEUTROPHILS NFR BLD AUTO: 73.5 % (ref 42.7–76)
NRBC BLD AUTO-RTO: 0 /100 WBC (ref 0–0.2)
PLATELET # BLD AUTO: 247 10*3/MM3 (ref 140–450)
PMV BLD AUTO: 8.9 FL (ref 6–12)
POTASSIUM SERPL-SCNC: 3.8 MMOL/L (ref 3.5–5.2)
RBC # BLD AUTO: 4.55 10*6/MM3 (ref 3.77–5.28)
SODIUM SERPL-SCNC: 137 MMOL/L (ref 136–145)
WBC NRBC COR # BLD: 10.2 10*3/MM3 (ref 3.4–10.8)

## 2023-04-02 PROCEDURE — 80048 BASIC METABOLIC PNL TOTAL CA: CPT | Performed by: NURSE PRACTITIONER

## 2023-04-02 PROCEDURE — 96366 THER/PROPH/DIAG IV INF ADDON: CPT

## 2023-04-02 PROCEDURE — 85025 COMPLETE CBC W/AUTO DIFF WBC: CPT | Performed by: NURSE PRACTITIONER

## 2023-04-02 PROCEDURE — G0378 HOSPITAL OBSERVATION PER HR: HCPCS

## 2023-04-02 PROCEDURE — 25010000002 LEVOFLOXACIN PER 250 MG: Performed by: EMERGENCY MEDICINE

## 2023-04-02 RX ORDER — LISINOPRIL 5 MG/1
10 TABLET ORAL DAILY
Status: DISCONTINUED | OUTPATIENT
Start: 2023-04-02 | End: 2023-04-02 | Stop reason: HOSPADM

## 2023-04-02 RX ORDER — LEVOFLOXACIN 750 MG/1
750 TABLET ORAL DAILY
Qty: 7 TABLET | Refills: 0 | Status: SHIPPED | OUTPATIENT
Start: 2023-04-02

## 2023-04-02 RX ORDER — SODIUM CHLORIDE 0.9 % (FLUSH) 0.9 %
10 SYRINGE (ML) INJECTION EVERY 12 HOURS SCHEDULED
Status: DISCONTINUED | OUTPATIENT
Start: 2023-04-02 | End: 2023-04-02 | Stop reason: HOSPADM

## 2023-04-02 RX ORDER — METRONIDAZOLE 500 MG/1
500 TABLET ORAL 3 TIMES DAILY
Qty: 21 TABLET | Refills: 0 | Status: SHIPPED | OUTPATIENT
Start: 2023-04-02 | End: 2023-04-09

## 2023-04-02 RX ORDER — ONDANSETRON 4 MG/1
4 TABLET, FILM COATED ORAL EVERY 6 HOURS PRN
Qty: 30 TABLET | Refills: 0 | Status: SHIPPED | OUTPATIENT
Start: 2023-04-02

## 2023-04-02 RX ORDER — L.ACID,PARA/B.BIFIDUM/S.THERM 8B CELL
1 CAPSULE ORAL DAILY
Status: DISCONTINUED | OUTPATIENT
Start: 2023-04-02 | End: 2023-04-02 | Stop reason: HOSPADM

## 2023-04-02 RX ORDER — HYDROCODONE BITARTRATE AND ACETAMINOPHEN 5; 325 MG/1; MG/1
1 TABLET ORAL EVERY 6 HOURS PRN
Qty: 25 TABLET | Refills: 0 | Status: SHIPPED | OUTPATIENT
Start: 2023-04-02 | End: 2023-04-09

## 2023-04-02 RX ORDER — SODIUM CHLORIDE 9 MG/ML
40 INJECTION, SOLUTION INTRAVENOUS AS NEEDED
Status: DISCONTINUED | OUTPATIENT
Start: 2023-04-02 | End: 2023-04-02 | Stop reason: HOSPADM

## 2023-04-02 RX ORDER — SODIUM CHLORIDE 0.9 % (FLUSH) 0.9 %
10 SYRINGE (ML) INJECTION AS NEEDED
Status: DISCONTINUED | OUTPATIENT
Start: 2023-04-02 | End: 2023-04-02 | Stop reason: HOSPADM

## 2023-04-02 RX ORDER — ATORVASTATIN CALCIUM 10 MG/1
10 TABLET, FILM COATED ORAL DAILY
Status: DISCONTINUED | OUTPATIENT
Start: 2023-04-02 | End: 2023-04-02 | Stop reason: HOSPADM

## 2023-04-02 RX ORDER — HYDROCODONE BITARTRATE AND ACETAMINOPHEN 5; 325 MG/1; MG/1
1 TABLET ORAL EVERY 6 HOURS PRN
Status: DISCONTINUED | OUTPATIENT
Start: 2023-04-02 | End: 2023-04-02 | Stop reason: HOSPADM

## 2023-04-02 RX ORDER — PANTOPRAZOLE SODIUM 40 MG/1
40 TABLET, DELAYED RELEASE ORAL
Status: DISCONTINUED | OUTPATIENT
Start: 2023-04-02 | End: 2023-04-02 | Stop reason: HOSPADM

## 2023-04-02 RX ADMIN — METRONIDAZOLE 500 MG: 500 INJECTION, SOLUTION INTRAVENOUS at 09:49

## 2023-04-02 RX ADMIN — Medication 1 CAPSULE: at 09:49

## 2023-04-02 RX ADMIN — SENNOSIDES AND DOCUSATE SODIUM 2 TABLET: 50; 8.6 TABLET ORAL at 09:49

## 2023-04-02 RX ADMIN — SODIUM CHLORIDE, POTASSIUM CHLORIDE, SODIUM LACTATE AND CALCIUM CHLORIDE 100 ML/HR: 600; 310; 30; 20 INJECTION, SOLUTION INTRAVENOUS at 07:07

## 2023-04-02 RX ADMIN — Medication 10 ML: at 09:50

## 2023-04-02 RX ADMIN — ATORVASTATIN CALCIUM 10 MG: 10 TABLET, FILM COATED ORAL at 09:49

## 2023-04-02 RX ADMIN — PANTOPRAZOLE SODIUM 40 MG: 40 TABLET, DELAYED RELEASE ORAL at 09:49

## 2023-04-02 RX ADMIN — AZELASTINE HYDROCHLORIDE 2 SPRAY: 137 SPRAY, METERED NASAL at 00:38

## 2023-04-02 RX ADMIN — LISINOPRIL 10 MG: 5 TABLET ORAL at 09:49

## 2023-04-02 RX ADMIN — LEVOFLOXACIN 750 MG: 5 INJECTION, SOLUTION INTRAVENOUS at 15:29

## 2023-04-02 RX ADMIN — HYDROCODONE BITARTRATE AND ACETAMINOPHEN 1 TABLET: 5; 325 TABLET ORAL at 10:38

## 2023-04-02 RX ADMIN — METRONIDAZOLE 500 MG: 500 INJECTION, SOLUTION INTRAVENOUS at 00:37

## 2023-04-02 NOTE — H&P
[facility] Observation Unit H&P    Patient Name: Itzel Hogan  : 1970  MRN: [id number]  Primary Care Physician: Keily Rodas APRN  Date of admission: 2023     Patient Care Team:  Keily Rodas APRN as PCP - General (Nurse Practitioner)  Oleg Flores MD as Consulting Physician (Cardiology)          Subjective   History Present Illness     Chief Complaint:   Chief Complaint   Patient presents with   • Abdominal Pain     Abdominal Pain     Ms. Hogan is a 52 y.o.  presents to [facility] complaining of abdominal pain       History of Present Illness    ED 23: 52-year-old female presents with left lower abdominal pain.  She started with symptoms last night.  She has had some nausea but no vomiting.  She states her bowels have been a little loose.  She reports no change in urination.  She states she has had some vaginal bleeding since last Wednesday.  She states she is perimenopausal and periods are irregular.  She has had no fever no cough no shortness of breath.  She has had no previous abdominal surgeries.    OBS 23: Patient is a 52-year-old female presented to the hospital with left lower abdominal pain.  Patient states she has nausea without vomiting, fever, chest pain, syncope, melena or hematochezia.  Patient states she is perimenopausal and menstruating currently.  No previous abdominal surgeries.  Patient states she had colonoscopy 2 years ago with benign polyps.  No family history of colon cancer family history of diverticulitis    Review of Systems   Constitutional: Positive for malaise/fatigue.   Eyes: Negative.    Cardiovascular: Negative.    Respiratory: Negative.    Endocrine: Negative.    Hematologic/Lymphatic: Negative.    Skin: Negative.    Musculoskeletal: Negative.    Gastrointestinal: Positive for abdominal pain and nausea.   Genitourinary: Negative.    Neurological: Positive for headaches.   Psychiatric/Behavioral: Negative.    Allergic/Immunologic: Negative.             Personal History     Past Medical History:   Past Medical History:   Diagnosis Date   • Allergic    • Allergies    • Anemia    • B12 deficiency    • GERD (gastroesophageal reflux disease)    • Hyperlipidemia    • Hypertension    • Migraine        Surgical History:      Past Surgical History:   Procedure Laterality Date   • CARPAL TUNNEL RELEASE Bilateral    • COLONOSCOPY     • ENDOSCOPY     • SINUS SURGERY     • TONSILLECTOMY     • WISDOM TOOTH EXTRACTION             Family History: family history includes Anemia in her father; Asthma in her mother and sister; Cancer in her mother; Heart attack in her maternal grandfather and maternal grandmother; Heart failure in her mother; Hyperlipidemia in her mother; Hypertension in her mother; Skin cancer in her father; Stroke in her mother. Otherwise pertinent FHx was reviewed and unremarkable.     Social History:  reports that she has never smoked. She has never used smokeless tobacco. She reports that she does not currently use alcohol. She reports that she does not use drugs.      Medications:  Prior to Admission medications    Medication Sig Start Date End Date Taking? Authorizing Provider   atorvastatin (LIPITOR) 10 MG tablet Take 1 tablet by mouth Daily. 10/27/22  Yes Keily Rodas APRN   azelastine (ASTELIN) 0.1 % nasal spray 2 sprays into the nostril(s) as directed by provider 2 (Two) Times a Day As Needed. 3/25/21  Yes Susie Marin MD   lisinopril (PRINIVIL,ZESTRIL) 10 MG tablet TAKE 1 TABLET DAILY 2/20/23  Yes Keily Rodas APRN   omeprazole (priLOSEC) 20 MG capsule Take 1 capsule by mouth Daily. 10/27/22  Yes Keily Rodas APRN   Probiotic Product (Probiotic Advanced) capsule Take 1 capsule by mouth Daily. Specific for vaginal health   Yes ProviderSusie MD   Triamcinolone Acetonide (NASACORT) 55 MCG/ACT nasal inhaler 2 sprays into the nostril(s) as directed by provider 2 (Two) Times a Day As Needed.   Yes Provider,  Historical, MD       Allergies:    Allergies   Allergen Reactions   • Penicillins Hives   • Prochlorperazine Dizziness   • Stadol [Butorphanol] Dizziness   • Cephalexin Hives       Objective   Objective     Vital Signs  Temp:  [98.2 °F (36.8 °C)-99.7 °F (37.6 °C)] 98.2 °F (36.8 °C)  Heart Rate:  [] 84  Resp:  [16-18] 16  BP: ()/(60-95) 118/74  SpO2:  [97 %-100 %] 99 %  on   ;   Device (Oxygen Therapy): room air  Body mass index is 34.03 kg/m².    Physical Exam  Vitals and nursing note reviewed.   Constitutional:       Appearance: Normal appearance.   HENT:      Head: Normocephalic and atraumatic.      Right Ear: External ear normal.      Left Ear: External ear normal.      Nose: Nose normal.      Mouth/Throat:      Mouth: Mucous membranes are moist.      Pharynx: Oropharynx is clear.   Eyes:      Extraocular Movements: Extraocular movements intact.      Conjunctiva/sclera: Conjunctivae normal.      Pupils: Pupils are equal, round, and reactive to light.   Cardiovascular:      Rate and Rhythm: Normal rate and regular rhythm.      Pulses: Normal pulses.      Heart sounds: Normal heart sounds.   Pulmonary:      Effort: Pulmonary effort is normal.      Breath sounds: Normal breath sounds.   Abdominal:      General: Bowel sounds are normal.      Palpations: Abdomen is soft.      Tenderness: There is abdominal tenderness.   Musculoskeletal:         General: Normal range of motion.      Cervical back: Normal range of motion.   Skin:     General: Skin is warm.      Capillary Refill: Capillary refill takes less than 2 seconds.   Neurological:      General: No focal deficit present.      Mental Status: She is alert and oriented to person, place, and time.   Psychiatric:         Mood and Affect: Mood normal.         Behavior: Behavior normal.         Thought Content: Thought content normal.         Judgment: Judgment normal.           Results Review:  I have personally reviewed most recent cardiac tracings, lab  results, microbiology results and radiology images and interpretations and agree with findings, most notably: CBC, CMP, lipase, CT abdomen pelvis, urinalysis.    Results from last 7 days   Lab Units 04/02/23  0535   WBC 10*3/mm3 10.20   HEMOGLOBIN g/dL 14.0   HEMATOCRIT % 41.4   PLATELETS 10*3/mm3 247     Results from last 7 days   Lab Units 04/02/23  0535 04/01/23  1257 03/27/23  1051   SODIUM mmol/L 137   < > 136   POTASSIUM mmol/L 3.8   < > 3.9   CHLORIDE mmol/L 100   < > 100   CO2 mmol/L 29.0   < > 24.8   BUN mg/dL 10   < > 12   CREATININE mg/dL 0.68   < > 0.68   GLUCOSE mg/dL 110*   < > 97   CALCIUM mg/dL 9.0   < > 9.1   ALT (SGPT) U/L  --   --  29   AST (SGOT) U/L  --   --  24    < > = values in this interval not displayed.     Estimated Creatinine Clearance: 98.6 mL/min (by C-G formula based on SCr of 0.68 mg/dL).  Brief Urine Lab Results  (Last result in the past 365 days)      Color   Clarity   Blood   Leuk Est   Nitrite   Protein   CREAT   Urine HCG        04/01/23 1301               Negative       04/01/23 1301 Yellow   Clear   Large (3+)   Negative   Negative   Negative                 Microbiology Results (last 10 days)     ** No results found for the last 240 hours. **          ECG/EMG Results (most recent)     None              Results for orders placed during the hospital encounter of 02/11/20    Adult Transthoracic Echo Complete W/ Cont if Necessary Per Protocol    Interpretation Summary  · Estimated EF = 60%.  · Left ventricular systolic function is normal.    Indications  Shortness of breath    Technically satisfactory study.  Mitral valve is structurally normal.  Tricuspid valve is structurally normal.  Aortic valve is structurally normal.  Pulmonic valve could not be well visualized.  No evidence for mitral tricuspid or aortic regurgitation is seen by Doppler study.  Left atrium is normal in size.  Right atrium is normal in size.  Left ventricle is normal in size and contractility with ejection  fraction of 60%.  Right ventricle is normal in size.  Atrial septum is intact.  Aorta is normal.  No pericardial effusion or intracardiac thrombus is seen.    Impression  Structurally and functionally normal cardiac valves.  Left ventricular size and contractility is normal with ejection fraction of 60%      CT Abdomen Pelvis Without Contrast    Result Date: 4/1/2023  1.There is severe diverticulitis of the sigmoid descending colon junction. No perforation or extraluminal abscess. I would recommend a follow-up CT and/or colonoscopy once acute symptoms have resolved to exclude the possibility of underlying mass causing  wall thickening this location. 2.Small hiatal hernia. Electronically Signed: Santa Kohli  4/1/2023 2:18 PM EDT  Workstation ID: [id number]        Estimated Creatinine Clearance: 98.6 mL/min (by C-G formula based on SCr of 0.68 mg/dL).    Assessment & Plan   Assessment/Plan       Active Hospital Problems    Diagnosis  POA   • **Acute diverticulitis [K57.92]  Yes      Resolved Hospital Problems   No resolved problems to display.     Acute diverticulitis  -CT abdomen pelvis showed severe diverticulitis sigmoid colon descending to colon junction with no perforation or abscess, small hiatal hernia  -IV/oral antiemetics and analgesics as needed  -Continue IV Levaquin and flagyl  - WBC 10.2 with slight increase in absolute neutrophil, monitor trend  - Clear liquid diet, advance as tolerated   - Continue IVF    Hyperlipidemia  -Continue atorvastatin    Chronic essential hypertension  -Continue lisinopril    GERD  -Continue PPI    VTE Prophylaxis -   Mechanical Order History:      Ordered        04/02/23 0812  Place Sequential Compression Device  Once            04/02/23 0812  Maintain Sequential Compression Device  Continuous            04/01/23 1726  Place Sequential Compression Device  Once            04/01/23 1726  Maintain Sequential Compression Device  Continuous                    Pharmalogical Order  History:     None          CODE STATUS:    Code Status and Medical Interventions:   Ordered at: 04/02/23 0813     Level Of Support Discussed With:    Patient     Code Status (Patient has no pulse and is not breathing):    CPR (Attempt to Resuscitate)     Medical Interventions (Patient has pulse or is breathing):    Full Support       This patient has been examined wearing personal protective equipment.     I discussed the patient's findings and my recommendations with patient, family, nursing staff and primary care team.      Signature:Electronically signed by HAVEN Thmoas, 04/02/23, 11:41 AM EDT.          I spent 45 minutes caring for Itzel on this date of service. This time includes time spent by me in the following activities: reviewing tests, obtaining and/or reviewing a separately obtained history, performing a medically appropriate examination and/or evaluation, counseling and educating the patient/family/caregiver, ordering medications, tests, or procedures, referring and communicating with other health care professionals, documenting information in the medical record, independently interpreting results and communicating that information with the patient/family/caregiver and care coordination.

## 2023-04-02 NOTE — PLAN OF CARE
Problem: Adult Inpatient Plan of Care  Goal: Plan of Care Review  Outcome: Ongoing, Not Progressing  Flowsheets (Taken 4/2/2023 0610)  Progress: no change  Plan of Care Reviewed With: patient  Outcome Evaluation: pt stable, getting IV fluids and IV abx, VSS, SR on tele, plan of care continued  Goal: Patient-Specific Goal (Individualized)  Outcome: Ongoing, Not Progressing  Goal: Absence of Hospital-Acquired Illness or Injury  Outcome: Ongoing, Not Progressing  Goal: Optimal Comfort and Wellbeing  Outcome: Ongoing, Not Progressing  Goal: Readiness for Transition of Care  Outcome: Ongoing, Not Progressing  Intervention: Mutually Develop Transition Plan  Recent Flowsheet Documentation  Taken 4/1/2023 2118 by Leonor Washington, RN  Transportation Anticipated: family or friend will provide  Patient/Family Anticipated Services at Transition: none  Patient/Family Anticipates Transition to: home with family  Taken 4/1/2023 2031 by Leonor Washington, RN  Equipment Currently Used at Home: none     Problem: Hypertension Comorbidity  Goal: Blood Pressure in Desired Range  Outcome: Ongoing, Not Progressing     Problem: Pain Acute  Goal: Acceptable Pain Control and Functional Ability  Outcome: Ongoing, Not Progressing     Problem: Nausea and Vomiting  Goal: Fluid and Electrolyte Balance  Outcome: Ongoing, Not Progressing   Goal Outcome Evaluation:  Plan of Care Reviewed With: patient        Progress: no change  Outcome Evaluation: pt stable, getting IV fluids and IV abx, VSS, SR on tele, plan of care continued

## 2023-04-02 NOTE — DISCHARGE SUMMARY
[facility]    Keily Rodas, APRN    CHIEF COMPLAINT:     Abdominal pain      HISTORY OF PRESENT ILLNESS:    [facility]    ED 4/1/23: 52-year-old female presents with left lower abdominal pain.  She started with symptoms last night.  She has had some nausea but no vomiting.  She states her bowels have been a little loose.  She reports no change in urination.  She states she has had some vaginal bleeding since last Wednesday.  She states she is perimenopausal and periods are irregular.  She has had no fever no cough no shortness of breath.  She has had no previous abdominal surgeries.     OBS 4/2/23: Patient is a 52-year-old female presented to the hospital with left lower abdominal pain.  Patient states she has nausea without vomiting, fever, chest pain, syncope, melena or hematochezia.  Patient states she is perimenopausal and menstruating currently.  No previous abdominal surgeries.  Patient states she had colonoscopy 2 years ago with benign polyps.  No family history of colon cancer family history of diverticulitis.    Past Medical History:   Diagnosis Date   • Allergic    • Allergies    • Anemia    • B12 deficiency    • GERD (gastroesophageal reflux disease)    • Hyperlipidemia    • Hypertension    • Migraine      Past Surgical History:   Procedure Laterality Date   • CARPAL TUNNEL RELEASE Bilateral    • COLONOSCOPY     • ENDOSCOPY     • SINUS SURGERY     • TONSILLECTOMY     • WISDOM TOOTH EXTRACTION       Family History   Problem Relation Age of Onset   • Stroke Mother    • Heart failure Mother    • Cancer Mother    • Asthma Mother    • Hyperlipidemia Mother    • Hypertension Mother    • Skin cancer Father    • Anemia Father    • Asthma Sister    • Heart attack Maternal Grandmother    • Heart attack Maternal Grandfather      Social History     Tobacco Use   • Smoking status: Never   • Smokeless tobacco: Never   Vaping Use   • Vaping Use: Never used   Substance Use Topics   • Alcohol use: Not  Currently     Comment: occasionally   • Drug use: Never     No medications prior to admission.     Allergies:  Penicillins, Prochlorperazine, Stadol [butorphanol], and Cephalexin    Immunization History   Administered Date(s) Administered   • Hep A, 2 Dose 03/19/2019   • Influenza Quad Vaccine (Inpatient) 10/22/2014   • Influenza, Unspecified 10/10/2019, 10/13/2020   • Tdap 06/11/2018           REVIEW OF SYSTEMS:    Review of Systems   Constitutional: Positive for malaise/fatigue.   HENT: Negative.    Eyes: Negative.    Cardiovascular: Negative.    Respiratory: Negative.    Endocrine: Negative.    Hematologic/Lymphatic: Negative.    Skin: Negative.    Musculoskeletal: Negative.    Gastrointestinal: Positive for abdominal pain and nausea.   Genitourinary: Negative.    Neurological: Negative.    Psychiatric/Behavioral: Negative.    Allergic/Immunologic: Negative.        Vital Signs             Physical Exam:  Physical Exam  Vitals and nursing note reviewed.   Constitutional:       Appearance: Normal appearance.   HENT:      Head: Normocephalic and atraumatic.      Right Ear: External ear normal.      Left Ear: External ear normal.      Nose: Nose normal.      Mouth/Throat:      Mouth: Mucous membranes are moist.      Pharynx: Oropharynx is clear.   Eyes:      Extraocular Movements: Extraocular movements intact.      Conjunctiva/sclera: Conjunctivae normal.      Pupils: Pupils are equal, round, and reactive to light.   Cardiovascular:      Rate and Rhythm: Normal rate and regular rhythm.      Pulses: Normal pulses.      Heart sounds: Normal heart sounds.   Pulmonary:      Effort: Pulmonary effort is normal.      Breath sounds: Normal breath sounds.   Abdominal:      General: Bowel sounds are normal.      Palpations: Abdomen is soft.      Tenderness: There is abdominal tenderness.   Musculoskeletal:         General: Normal range of motion.      Cervical back: Normal range of motion.   Skin:     General: Skin is warm.       Capillary Refill: Capillary refill takes less than 2 seconds.   Neurological:      General: No focal deficit present.      Mental Status: She is alert and oriented to person, place, and time.   Psychiatric:         Mood and Affect: Mood normal.         Behavior: Behavior normal.         Thought Content: Thought content normal.         Judgment: Judgment normal.         Emotional Behavior:    WNL   Debilities:   none  Results Review:    I reviewed the patient's new clinical results.  Lab Results (most recent)     Procedure Component Value Units Date/Time    Basic Metabolic Panel [[id number]]  (Abnormal) Collected: 04/02/23 0535    Specimen: Blood from Arm, Right Updated: 04/02/23 0612     Glucose 110 mg/dL      BUN 10 mg/dL      Creatinine 0.68 mg/dL      Sodium 137 mmol/L      Potassium 3.8 mmol/L      Chloride 100 mmol/L      CO2 29.0 mmol/L      Calcium 9.0 mg/dL      BUN/Creatinine Ratio 14.7     Anion Gap 8.0 mmol/L      eGFR 105.6 mL/min/1.73     Narrative:      GFR Normal >60  Chronic Kidney Disease <60  Kidney Failure <15      CBC & Differential [[id number]]  (Abnormal) Collected: 04/02/23 0535    Specimen: Blood from Arm, Right Updated: 04/02/23 0545    Narrative:      The following orders were created for panel order CBC & Differential.  Procedure                               Abnormality         Status                     ---------                               -----------         ------                     CBC Auto Differential[[id number]]        Abnormal            Final result                 Please view results for these tests on the individual orders.    CBC Auto Differential [[id number]]  (Abnormal) Collected: 04/02/23 0535    Specimen: Blood from Arm, Right Updated: 04/02/23 0545     WBC 10.20 10*3/mm3      RBC 4.55 10*6/mm3      Hemoglobin 14.0 g/dL      Hematocrit 41.4 %      MCV 91.0 fL      MCH 30.8 pg      MCHC 33.8 g/dL      RDW 13.4 %      RDW-SD 44.6 fl      MPV 8.9 fL      Platelets 247  10*3/mm3      Neutrophil % 73.5 %      Lymphocyte % 17.3 %      Monocyte % 7.7 %      Eosinophil % 0.9 %      Basophil % 0.6 %      Neutrophils, Absolute 7.50 10*3/mm3      Lymphocytes, Absolute 1.80 10*3/mm3      Monocytes, Absolute 0.80 10*3/mm3      Eosinophils, Absolute 0.10 10*3/mm3      Basophils, Absolute 0.10 10*3/mm3      nRBC 0.0 /100 WBC     Basic Metabolic Panel [[id number]]  (Abnormal) Collected: 04/01/23 1257    Specimen: Blood Updated: 04/01/23 1337     Glucose 100 mg/dL      BUN 14 mg/dL      Creatinine 0.65 mg/dL      Sodium 137 mmol/L      Potassium 4.5 mmol/L      Chloride 99 mmol/L      CO2 28.0 mmol/L      Calcium 9.8 mg/dL      BUN/Creatinine Ratio 21.5     Anion Gap 10.0 mmol/L      eGFR 106.7 mL/min/1.73     Narrative:      GFR Normal >60  Chronic Kidney Disease <60  Kidney Failure <15      Pregnancy, Urine - Urine, Clean Catch [[id number]]  (Normal) Collected: 04/01/23 1301    Specimen: Urine, Clean Catch Updated: 04/01/23 1315     HCG, Urine QL Negative    Urinalysis With Microscopic If Indicated (No Culture) - Urine, Clean Catch [[id number]]  (Abnormal) Collected: 04/01/23 1301    Specimen: Urine, Clean Catch Updated: 04/01/23 1314     Color, UA Yellow     Appearance, UA Clear     pH, UA 8.0     Specific Gravity, UA 1.009     Glucose, UA Negative     Ketones, UA Negative     Bilirubin, UA Negative     Blood, UA Large (3+)     Protein, UA Negative     Leuk Esterase, UA Negative     Nitrite, UA Negative     Urobilinogen, UA 0.2 E.U./dL    Urinalysis, Microscopic Only - Urine, Clean Catch [[id number]]  (Abnormal) Collected: 04/01/23 1301    Specimen: Urine, Clean Catch Updated: 04/01/23 1314     RBC, UA Too Numerous to Count /HPF      WBC, UA 0-2 /HPF      Bacteria, UA None Seen /HPF      Squamous Epithelial Cells, UA 0-2 /HPF      Hyaline Casts, UA None Seen /LPF      Methodology Automated Microscopy    CBC & Differential [[id number]]  (Abnormal) Collected: 04/01/23 1257    Specimen: Blood  Updated: 04/01/23 1312    Narrative:      The following orders were created for panel order CBC & Differential.  Procedure                               Abnormality         Status                     ---------                               -----------         ------                     CBC Auto Differential[[id number]]        Abnormal            Final result                 Please view results for these tests on the individual orders.    CBC Auto Differential [[id number]]  (Abnormal) Collected: 04/01/23 1257    Specimen: Blood Updated: 04/01/23 1312     WBC 10.90 10*3/mm3      RBC 4.90 10*6/mm3      Hemoglobin 14.7 g/dL      Hematocrit 45.0 %      MCV 91.8 fL      MCH 30.0 pg      MCHC 32.6 g/dL      RDW 13.5 %      RDW-SD 42.9 fl      MPV 9.1 fL      Platelets 260 10*3/mm3      Neutrophil % 81.9 %      Lymphocyte % 9.6 %      Monocyte % 7.6 %      Eosinophil % 0.4 %      Basophil % 0.5 %      Neutrophils, Absolute 9.00 10*3/mm3      Lymphocytes, Absolute 1.00 10*3/mm3      Monocytes, Absolute 0.80 10*3/mm3      Eosinophils, Absolute 0.00 10*3/mm3      Basophils, Absolute 0.00 10*3/mm3      nRBC 0.0 /100 WBC           Imaging Results (Most Recent)     Procedure Component Value Units Date/Time    CT Abdomen Pelvis Without Contrast [[id number]] Collected: 04/01/23 1414     Updated: 04/01/23 1420    Narrative:      CT ABDOMEN PELVIS WO CONTRAST    Date of Exam: 4/1/2023 1:54 PM EDT    Indication: pain.  Left abdominal pain and flank pain. Loose stools. Nausea.    Comparison: None available.    Technique: Axial CT images were obtained of the abdomen and pelvis without the administration of contrast. Sagittal and coronal reconstructions were performed.  Automated exposure control and iterative reconstruction methods were used.     Findings:  Lower Thorax: Lung bases are clear.    Peritoneum: No free air or free fluid.     Appendix: Appendix is well seen and is normal.    Kidneys, ureters, and urinary bladder: No  hydronephrosis.  No nephroureterolithiasis. No focal renal lesions.  Normal appearance of urinary bladder given the amount of distention.    Liver, gallbladder, and bile ducts: No focal hepatic lesions. Gallbladder and bile ducts are unremarkable.    Spleen: Spleen is normal size.  No focal splenic lesions.    Adrenal glands: Unremarkable.    Pancreas: No focal masses.  No pancreatic duct dilation. No surrounding inflammation.    Abdominal aorta and Vascular Structures: No aneurysmal dilation. No significant atherosclerotic disease.    Stomach and Bowel: There is severe wall thickening of the colon at the descending sigmoid colon junction with associated inflamed diverticuli. There is associated inflammatory fluid. No perforation is seen. No extraluminal gas is present. Small hiatal   hernia. Ligament of Treitz has normal anatomic position.    Reproductive Organs: Within normal limits.    Lymph nodes: No pathologically enlarged lymph nodes.    Soft tissues: Unremarkable.    Osseous structures: No aggressive focal lytic or sclerotic osseous lesions.    Evaluation of bowel and solid organs is limited without contrast administration.      Impression:      1.There is severe diverticulitis of the sigmoid descending colon junction. No perforation or extraluminal abscess. I would recommend a follow-up CT and/or colonoscopy once acute symptoms have resolved to exclude the possibility of underlying mass causing   wall thickening this location.  2.Small hiatal hernia.        Electronically Signed: Santa Kholi    4/1/2023 2:18 PM EDT    Workstation ID: [id number]        reviewed    ECG/EMG Results (most recent)     Procedure Component Value Units Date/Time    SCANNED - TELEMETRY   [[id number]] Resulted: 04/01/23     Updated: 04/03/23 1626    SCANNED - TELEMETRY   [[id number]] Resulted: 04/01/23     Updated: 04/04/23 0615    SCANNED - TELEMETRY   [[id number]] Resulted: 04/01/23     Updated: 04/04/23 0615    SCANNED - TELEMETRY    [[id number]] Resulted: 04/01/23     Updated: 04/04/23 0624    SCANNED - TELEMETRY   [[id number]] Resulted: 04/01/23     Updated: 04/04/23 0624    SCANNED - TELEMETRY   [[id number]] Resulted: 04/01/23     Updated: 04/04/23 0631        reviewed        Results for orders placed during the hospital encounter of 02/11/20    Adult Transthoracic Echo Complete W/ Cont if Necessary Per Protocol    Interpretation Summary  · Estimated EF = 60%.  · Left ventricular systolic function is normal.    Indications  Shortness of breath    Technically satisfactory study.  Mitral valve is structurally normal.  Tricuspid valve is structurally normal.  Aortic valve is structurally normal.  Pulmonic valve could not be well visualized.  No evidence for mitral tricuspid or aortic regurgitation is seen by Doppler study.  Left atrium is normal in size.  Right atrium is normal in size.  Left ventricle is normal in size and contractility with ejection fraction of 60%.  Right ventricle is normal in size.  Atrial septum is intact.  Aorta is normal.  No pericardial effusion or intracardiac thrombus is seen.    Impression  Structurally and functionally normal cardiac valves.  Left ventricular size and contractility is normal with ejection fraction of 60%      Microbiology Results (last 10 days)     ** No results found for the last 240 hours. **          Assessment & Plan     Acute diverticulitis     Acute diverticulitis  -CT abdomen pelvis showed severe diverticulitis sigmoid colon descending to colon junction with no perforation or abscess, small hiatal hernia  -IV/oral antiemetics and analgesics as needed  -Continue IV Levaquin and flagyl  - WBC 10.2 with slight increase in absolute neutrophil, monitor trend  - Clear liquid diet, advance as tolerated   - Continue IVF     Hyperlipidemia  -Continue atorvastatin     Chronic essential hypertension  -Continue lisinopril     GERD  -Continue PPI    I discussed the patients findings and my recommendations  with patient.     Discharge Diagnosis:      Acute diverticulitis      Hospital Course  Patient is a 52 y.o. female presented with abdominal pain.  Patient reports some nausea without vomiting or fever.  Patient states that she is in premenopause but on her period currently with some bleeding but nothing abnormal gynecological wise for her.  Patient reports history of family having diverticulitis.  CT of abdomen pelvis showed severe diverticulitis with sigmoid colon descending colon junction with no perforation or abscess small hiatal hernia.  Patient given IV fluids and oral antiemetics and analgesics as needed.  Patient started IV Levaquin and Flagyl in ED and continued upon observation unit.  Patient had white blood cell 10.2 with increased absolute neutrophil count.  Patient able to tolerate clear diet and advance to low residue and tolerated well without nausea or vomiting.  Patient afebrile and hemodynamically stable upon discharge.  Patient states that she felt she was well enough to go home and continue on oral antibiotics outpatient.  Test and recommendations reviewed with patient and she agrees treatment plan.  Patient referral for outpatient GI evaluation.  Patient to follow PCP in 1 week for continued care management.  If symptoms worsen patient to call 911 or go to nearest ED.    Past Medical History:     Past Medical History:   Diagnosis Date   • Allergic    • Allergies    • Anemia    • B12 deficiency    • GERD (gastroesophageal reflux disease)    • Hyperlipidemia    • Hypertension    • Migraine        Past Surgical History:     Past Surgical History:   Procedure Laterality Date   • CARPAL TUNNEL RELEASE Bilateral    • COLONOSCOPY     • ENDOSCOPY     • SINUS SURGERY     • TONSILLECTOMY     • WISDOM TOOTH EXTRACTION         Social History:   Social History     Socioeconomic History   • Marital status:    Tobacco Use   • Smoking status: Never   • Smokeless tobacco: Never   Vaping Use   • Vaping  Use: Never used   Substance and Sexual Activity   • Alcohol use: Not Currently     Comment: occasionally   • Drug use: Never   • Sexual activity: Yes     Partners: Male     Birth control/protection: Condom       Procedures Performed         Consults:   Consults     No orders found from 3/3/2023 to 4/2/2023.          Condition on Discharge:     Stable    Discharge Disposition  Home or Self Care    Discharge Medications     Discharge Medications      New Medications      Instructions Start Date   HYDROcodone-acetaminophen 5-325 MG per tablet  Commonly known as: NORCO   1 tablet, Oral, Every 6 Hours PRN      levoFLOXacin 750 MG tablet  Commonly known as: Levaquin   750 mg, Oral, Daily      metroNIDAZOLE 500 MG tablet  Commonly known as: Flagyl   500 mg, Oral, 3 Times Daily      ondansetron 4 MG tablet  Commonly known as: ZOFRAN   4 mg, Oral, Every 6 Hours PRN         Continue These Medications      Instructions Start Date   atorvastatin 10 MG tablet  Commonly known as: LIPITOR   10 mg, Oral, Daily      azelastine 0.1 % nasal spray  Commonly known as: ASTELIN   2 sprays, Nasal, 2 Times Daily PRN      lisinopril 10 MG tablet  Commonly known as: PRINIVIL,ZESTRIL   TAKE 1 TABLET DAILY      omeprazole 20 MG capsule  Commonly known as: priLOSEC   20 mg, Oral, Daily      Probiotic Advanced capsule   1 capsule, Oral, Daily, Specific for vaginal health      Triamcinolone Acetonide 55 MCG/ACT nasal inhaler  Commonly known as: NASACORT   2 sprays, Nasal, 2 Times Daily PRN             Discharge Diet:     Activity at Discharge:   Activity Instructions     Activity as Tolerated      Lifting Restrictions      Type of Restriction: Lifting    Lifting Restrictions: Avoid Straining to Lift    Length of Lifting Restriction: 5 days    Work Restrictions      Type of Restriction: Work    May Return to Work: Specific Date    Return To Work Date: 4/5/2023    With / Without Restrictions: With Restrictions    Explain Work Restrictions: lifting           Follow-up Appointments  Future Appointments   Date Time Provider Department Center   4/5/2023 11:00 AM Keily Rodas APRN [facility]   4/27/2023 12:30 PM HERMES Corbin DPM [facility]   9/26/2023  2:45 PM Keily Rodas APRN [facility]     Additional Instructions for the Follow-ups that You Need to Schedule     Discharge Follow-up with PCP   As directed       Currently Documented PCP:    Keily Rodas APRN    PCP Phone Number:    [phone]     Follow Up Details: 7-10 days               Test Results Pending at Discharge       Risk for Readmission (LACE) Score: 1 (4/2/2023  6:00 AM)          HAVEN Thomas  04/04/23  08:27 EDT        I spent 45 minutes caring for Itzel on this date of service. This time includes time spent by me in the following activities: reviewing tests, obtaining and/or reviewing a separately obtained history, performing a medically appropriate examination and/or evaluation, counseling and educating the patient/family/caregiver, ordering medications, tests, or procedures, referring and communicating with other health care professionals, documenting information in the medical record, independently interpreting results and communicating that information with the patient/family/caregiver and care coordination.

## 2023-04-02 NOTE — OUTREACH NOTE
Prep Survey    Flowsheet Row Responses   [facility] discharged from? [facility]   Is LACE score < 7 ? Yes   Eligibility [facility]   Date of Admission 04/01/23   Date of Discharge 04/02/23   Discharge Disposition Home or Self Care   Discharge diagnosis Acute diverticulitis   Does the patient have one of the following disease processes/diagnoses(primary or secondary)? Other   Does the patient have Home health ordered? No   Is there a DME ordered? No   Prep survey completed? Yes          Angle GARCIA - Registered Nurse

## 2023-04-03 ENCOUNTER — TRANSITIONAL CARE MANAGEMENT TELEPHONE ENCOUNTER (OUTPATIENT)
Dept: CALL CENTER | Facility: HOSPITAL | Age: 53
End: 2023-04-03
Payer: COMMERCIAL

## 2023-04-05 ENCOUNTER — OFFICE VISIT (OUTPATIENT)
Dept: FAMILY MEDICINE CLINIC | Facility: CLINIC | Age: 53
End: 2023-04-05
Payer: COMMERCIAL

## 2023-04-05 VITALS
DIASTOLIC BLOOD PRESSURE: 82 MMHG | HEART RATE: 87 BPM | HEIGHT: 62 IN | OXYGEN SATURATION: 99 % | WEIGHT: 183 LBS | SYSTOLIC BLOOD PRESSURE: 120 MMHG | BODY MASS INDEX: 33.68 KG/M2

## 2023-04-05 DIAGNOSIS — K57.92 DIVERTICULITIS: Primary | ICD-10-CM

## 2023-04-05 NOTE — PROGRESS NOTES
"Transitional Care Follow Up Visit  Subjective     Itzel Hogan is a 52 y.o. female who presents for a transitional care management visit.    Within 48 business hours after discharge our office contacted her via telephone to coordinate her care and needs.      I reviewed and discussed the details of that call along with the discharge summary, hospital problems, inpatient lab results, inpatient diagnostic studies, and consultation reports with Itzel.     Current outpatient and discharge medications have been reconciled for the patient.  Reviewed by: HAVEN Suarez      Date of TCM Phone Call 4/2/2023   [facility]   Date of Admission 4/1/2023   Date of Discharge 4/2/2023   Discharge Disposition Home or Self Care     Risk for Readmission (LACE) Score: 1 (4/2/2023  6:00 AM)      History of Present Illness   Course During Hospital Stay:  Hospital course per hospital record: \"Patient is a 52 y.o. female presented with abdominal pain.  Patient reports some nausea without vomiting or fever.  Patient states that she is in premenopause but on her period currently with some bleeding but nothing abnormal gynecological wise for her.  Patient reports history of family having diverticulitis.  CT of abdomen pelvis showed severe diverticulitis with sigmoid colon descending colon junction with no perforation or abscess small hiatal hernia.  Patient given IV fluids and oral antiemetics and analgesics as needed.  Patient started IV Levaquin and Flagyl in ED and continued upon observation unit.  Patient had white blood cell 10.2 with increased absolute neutrophil count.  Patient able to tolerate clear diet and advance to low residue and tolerated well without nausea or vomiting.  Patient afebrile and hemodynamically stable upon discharge.  Patient states that she felt she was well enough to go home and continue on oral antibiotics outpatient.  Test and recommendations reviewed with patient and she agrees " "treatment plan.  Patient referral for outpatient GI evaluation.  Patient to follow PCP in 1 week for continued care management.  If symptoms worsen patient to call 911 or go to nearest ED.\"    Seeing GI on May 3.   Continues with Levaquin and Flagyl.   Unsure about what to eat.   Pain is improving.  Denies any N/V/D or constipation.  Has been having soft BM's; no blood.  No fevers.  Last colonoscopy and EGD 2021.       The following portions of the patient's history were reviewed and updated as appropriate: allergies, current medications, past family history, past medical history, past social history, past surgical history and problem list.    Review of Systems   Constitutional: Positive for appetite change. Negative for chills, fatigue and fever.   Respiratory: Negative for cough, shortness of breath and wheezing.    Cardiovascular: Negative for chest pain and palpitations.   Gastrointestinal: Positive for abdominal pain. Negative for blood in stool, constipation, diarrhea, nausea and vomiting.   Genitourinary: Negative for dysuria, frequency, hematuria and urgency.   Neurological: Negative for dizziness, weakness and headaches.   Psychiatric/Behavioral: Negative for dysphoric mood. The patient is not nervous/anxious.        Objective   Physical Exam  Vitals reviewed.   Constitutional:       General: She is not in acute distress.     Appearance: Normal appearance. She is obese.   Cardiovascular:      Rate and Rhythm: Normal rate and regular rhythm.      Pulses: Normal pulses.      Heart sounds: Normal heart sounds. No murmur heard.  Pulmonary:      Effort: Pulmonary effort is normal. No respiratory distress.      Breath sounds: Normal breath sounds. No wheezing.   Chest:      Chest wall: No tenderness.   Abdominal:      General: Bowel sounds are normal. There is no distension.      Palpations: Abdomen is soft. There is no mass.      Tenderness: There is abdominal tenderness (mild diffuse lower quad tenderness). " There is no right CVA tenderness, left CVA tenderness, guarding or rebound.   Neurological:      General: No focal deficit present.      Mental Status: She is alert and oriented to person, place, and time.   Psychiatric:         Mood and Affect: Mood normal.         Assessment & Plan   Diagnoses and all orders for this visit:    1. Diverticulitis (Primary)  Comments:  Finish all Levaquin and Flagyl.   Reviewed diverticuli diet.    Keep f/u with GI.   Call with any concerns.

## 2023-04-05 NOTE — LETTER
April 5, 2023     Patient: Itzel Hogan   YOB: 1970   Date of Visit: 4/5/2023       To Whom It May Concern:    It is my medical opinion that Itzel Hogan may return to work on April 10, 2023 without restrictions.          Sincerely,    HAVEN Suarez

## 2023-04-24 DIAGNOSIS — K21.9 GASTROESOPHAGEAL REFLUX DISEASE, UNSPECIFIED WHETHER ESOPHAGITIS PRESENT: ICD-10-CM

## 2023-04-24 DIAGNOSIS — E78.2 MIXED HYPERLIPIDEMIA: ICD-10-CM

## 2023-04-24 RX ORDER — ATORVASTATIN CALCIUM 10 MG/1
TABLET, FILM COATED ORAL
Qty: 90 TABLET | Refills: 3 | Status: SHIPPED | OUTPATIENT
Start: 2023-04-24

## 2023-04-24 RX ORDER — OMEPRAZOLE 20 MG/1
CAPSULE, DELAYED RELEASE ORAL
Qty: 90 CAPSULE | Refills: 3 | Status: SHIPPED | OUTPATIENT
Start: 2023-04-24

## 2023-04-27 ENCOUNTER — OFFICE VISIT (OUTPATIENT)
Dept: PODIATRY | Facility: CLINIC | Age: 53
End: 2023-04-27
Payer: COMMERCIAL

## 2023-04-27 VITALS — WEIGHT: 183 LBS | BODY MASS INDEX: 33.68 KG/M2 | HEIGHT: 62 IN | RESPIRATION RATE: 20 BRPM

## 2023-04-27 DIAGNOSIS — M79.672 BILATERAL FOOT PAIN: Primary | ICD-10-CM

## 2023-04-27 DIAGNOSIS — M54.31 RIGHT SIDED SCIATICA: ICD-10-CM

## 2023-04-27 DIAGNOSIS — M21.6X1 ACQUIRED EQUINUS DEFORMITY OF BOTH FEET: ICD-10-CM

## 2023-04-27 DIAGNOSIS — M20.11 HALLUX VALGUS, RIGHT: ICD-10-CM

## 2023-04-27 DIAGNOSIS — M20.41 HAMMER TOE OF SECOND TOE OF RIGHT FOOT: ICD-10-CM

## 2023-04-27 DIAGNOSIS — M21.6X2 ACQUIRED EQUINUS DEFORMITY OF BOTH FEET: ICD-10-CM

## 2023-04-27 DIAGNOSIS — M79.671 BILATERAL FOOT PAIN: Primary | ICD-10-CM

## 2023-04-27 DIAGNOSIS — M77.41 METATARSALGIA, RIGHT FOOT: ICD-10-CM

## 2023-04-27 RX ORDER — METHYLPREDNISOLONE 4 MG/1
TABLET ORAL
Qty: 21 TABLET | Refills: 0 | Status: SHIPPED | OUTPATIENT
Start: 2023-04-27

## 2023-04-27 NOTE — PROGRESS NOTES
04/27/2023  Foot and Ankle Surgery - New Patient   Provider: Dr. Leon Corbin DPM  Location: [facility]    Subjective:  Itzel Hogan is a 52 y.o. female.     Chief Complaint   Patient presents with   • Right Foot - Bunions, Toe Pain     PAIN X 1 YEAR   • Initial Evaluation     MER Rodas emma  4/5/2023       HPI: The patient presents to the clinic for right foot pain.    She reports she saw Dr. Carmona approximately 1 year ago and she is here for a 2nd opinion. She reports experiencing a burning sensation to the plantar aspect of her right 2nd toe. She notes her pain is exacerbated when wearing shoes. The patient states she works in  at [employer]. She reports she has been informed multiple times she needs to lose weight but is unable to exercise. She reports she was informed she has a bunion on her right foot as well.  Patient does need to wear nonslip shoes for work.  She has obtained custom inserts in the past but states that she has not been wearing them.  Patient does have issues of pain involving her right low back and buttocks region consistent with sciatica.      Allergies   Allergen Reactions   • Penicillins Hives   • Prochlorperazine Dizziness   • Stadol [Butorphanol] Dizziness   • Cephalexin Hives       Past Medical History:   Diagnosis Date   • Allergic    • Allergies    • Anemia    • B12 deficiency    • Diverticulosis April 1 , 2023   • GERD (gastroesophageal reflux disease)    • Hyperlipidemia    • Hypertension    • Migraine        Past Surgical History:   Procedure Laterality Date   • CARPAL TUNNEL RELEASE Bilateral    • COLONOSCOPY     • ENDOSCOPY     • SINUS SURGERY     • TONSILLECTOMY     • WISDOM TOOTH EXTRACTION         Family History   Problem Relation Age of Onset   • Stroke Mother    • Heart failure Mother    • Cancer Mother    • Asthma Mother    • Hyperlipidemia Mother    • Hypertension Mother    • Skin cancer Father    • Anemia Father    • Asthma Sister    • Heart attack  Maternal Grandmother    • Heart attack Maternal Grandfather        Social History     Socioeconomic History   • Marital status:    Tobacco Use   • Smoking status: Never   • Smokeless tobacco: Never   Vaping Use   • Vaping Use: Never used   Substance and Sexual Activity   • Alcohol use: Not Currently     Comment: occasionally   • Drug use: Never   • Sexual activity: Yes     Partners: Male     Birth control/protection: Condom        Current Outpatient Medications on File Prior to Visit   Medication Sig Dispense Refill   • atorvastatin (LIPITOR) 10 MG tablet TAKE 1 TABLET DAILY 90 tablet 3   • azelastine (ASTELIN) 0.1 % nasal spray 2 sprays into the nostril(s) as directed by provider 2 (Two) Times a Day As Needed.     • lisinopril (PRINIVIL,ZESTRIL) 10 MG tablet TAKE 1 TABLET DAILY 90 tablet 3   • omeprazole (priLOSEC) 20 MG capsule TAKE 1 CAPSULE DAILY 90 capsule 3   • ondansetron (ZOFRAN) 4 MG tablet Take 1 tablet by mouth Every 6 (Six) Hours As Needed for Nausea or Vomiting. 30 tablet 0   • Probiotic Product (Probiotic Advanced) capsule Take 1 capsule by mouth Daily. Specific for vaginal health     • levoFLOXacin (Levaquin) 750 MG tablet Take 1 tablet by mouth Daily. 7 tablet 0     No current facility-administered medications on file prior to visit.       Review of Systems:  General: Denies fever, chills, fatigue, and weakness.  Eyes: Denies vision loss, blurry vision, and excessive redness.  ENT: Denies hearing issues and difficulty swallowing.  Cardiovascular: Denies palpitations, chest pain, or syncopal episodes.  Respiratory: Denies shortness of breath, wheezing, and coughing.  GI: Denies abdominal pain, nausea, and vomiting.   : Denies frequency, hematuria, and urgency.  Musculoskeletal: Denies muscle cramps, joint pains, and stiffness.  Derm: Denies rash, open wounds, or suspicious lesions.  Neuro: Denies headaches, numbness, loss of coordination, and tremors.  Psych: Denies anxiety and  "depression.  Endocrine: Denies temperature intolerance and changes in appetite.  Heme: Denies bleeding disorders or abnormal bruising.     Objective   Resp 20   Ht 157.5 cm (62\")   Wt 83 kg (183 lb)   LMP 04/01/2023   BMI 33.47 kg/m²     Foot/Ankle Exam    GENERAL  Orientation:  AAOx3  Affect:  appropriate    VASCULAR     Right Foot Vascularity   Normal vascular exam    Dorsalis pedis:  2+  Posterior tibial:  2+  Skin temperature:  warm  Edema grading:  None  CFT:  < 3 seconds  Pedal hair growth:  Present  Varicosities:  none     Left Foot Vascularity   Normal vascular exam    Dorsalis pedis:  2+  Posterior tibial:  2+  Skin temperature:  warm  Edema grading:  None  CFT:  < 3 seconds  Pedal hair growth:  Present  Varicosities:  none     NEUROLOGIC     Right Foot Neurologic   Light touch sensation: normal  Hot/Cold sensation: normal  Achilles reflex:  2+     Left Foot Neurologic   Light touch sensation: normal  Hot/Cold sensation:  normal  Achilles reflex:  2+    MUSCULOSKELETAL     Right Foot Musculoskeletal   Arch:  Normal     Left Foot Musculoskeletal   Arch:  Normal    MUSCLE STRENGTH     Right Foot Muscle Strength   Normal strength    Foot dorsiflexion:  5  Foot plantar flexion:  5  Foot inversion:  5  Foot eversion:  5     Left Foot Muscle Strength   Normal strength    Foot dorsiflexion:  5  Foot plantar flexion:  5  Foot inversion:  5  Foot eversion:  5    DERMATOLOGIC      Right Foot Dermatologic   Skin  Right foot skin is intact.      Left Foot Dermatologic   Skin  Left foot skin is intact.     TESTS     Right Foot Tests   Anterior drawer: negative  Varus tilt: negative     Left Foot Tests   Anterior drawer: negative  Varus tilt: negative     Right foot additional comments: 04/27/2023: Moderate equinus contracture with knee extended and flexed. Discomfort with palpation involving the plantar aspect of the second metatarsophalangeal joint region. Mild hammer digital deformity and mild bunion deformity " with dorsal medial bony prominence.      Assessment & Plan   Diagnoses and all orders for this visit:    1. Bilateral foot pain (Primary)  -     XR Foot 3+ View Right    2. Metatarsalgia, right foot    3. Hammer toe of second toe of right foot  -     Ambulatory Referral to Physical Therapy Evaluate and treat    4. Hallux valgus, right    5. Acquired equinus deformity of both feet  -     Ambulatory Referral to Physical Therapy Evaluate and treat    6. Right sided sciatica    Other orders  -     methylPREDNISolone (MEDROL) 4 MG dose pack; Take as directed on package instructions.  Dispense: 21 tablet; Refill: 0        Patient presents for right foot pain. Imaging was independently reviewed showing mild bunion deformity with dorsal medial bony prominence. We discussed the etiology and biomechanics involved with her right foot pain. I recommend conservative treatment at this time. I recommend over-the-counter PowerStep arch supports to place in her tennis shoes daily. We discussed avoidance of ambulating while barefoot or in flip-flops, sandals, flats, or Crocs. I recommend stretching exercises 2 to 3 times per day. I will order physical therapy and a Medrol dosepak for the patient. The patient will return to the office in 4 to 6 weeks for reevaluation. Greater than 45 minutes was spent before, during, and after evaluation for patient care.      Orders Placed This Encounter   Procedures   • XR Foot 3+ View Right     Order Specific Question:   Reason for Exam:     Answer:   PAIN AT TOES, TOP OF FOOT AND GREAT TOE X 1 YEAR   ROOM 14  WB     Order Specific Question:   Does this patient have a diabetic monitoring/medication delivering device on?     Answer:   No     Order Specific Question:   Release to patient     Answer:   Routine Release   • Ambulatory Referral to Physical Therapy Evaluate and treat     Referral Priority:   Routine     Referral Type:   Physical Therapy     Referral Reason:   Specialty Services Required      Referral Location:   [facility] [place]     Requested Specialty:   Physical Therapy     Number of Visits Requested:   1        Note is dictated utilizing voice recognition software. Unfortunately this leads to occasional typographical errors. I apologize in advance if the situation occurs. If questions occur please do not hesitate to call our office.    Transcribed from ambient dictation for HERMES Corbin DPM by Katja Alberto.  04/27/23   13:26 EDT    Patient or patient representative verbalized consent to the visit recording.  I have personally performed the services described in this document as transcribed by the above individual, and it is both accurate and complete.

## 2023-05-03 ENCOUNTER — OFFICE (AMBULATORY)
Dept: URBAN - METROPOLITAN AREA CLINIC 64 | Facility: CLINIC | Age: 53
End: 2023-05-03

## 2023-05-03 VITALS — HEIGHT: 63 IN

## 2023-05-03 DIAGNOSIS — K57.32 DIVERTICULITIS OF LARGE INTESTINE WITHOUT PERFORATION OR ABS: ICD-10-CM

## 2023-05-03 DIAGNOSIS — R10.32 LEFT LOWER QUADRANT PAIN: ICD-10-CM

## 2023-05-03 PROCEDURE — 99214 OFFICE O/P EST MOD 30 MIN: CPT

## 2023-05-03 RX ORDER — DICYCLOMINE HYDROCHLORIDE 20 MG/1
80 TABLET ORAL
Qty: 120 | Refills: 6 | Status: COMPLETED
Start: 2023-05-03 | End: 2024-02-05

## 2023-05-30 ENCOUNTER — TRANSCRIBE ORDERS (OUTPATIENT)
Dept: ADMINISTRATIVE | Facility: HOSPITAL | Age: 53
End: 2023-05-30

## 2023-05-30 DIAGNOSIS — R10.32 LLQ ABDOMINAL PAIN: Primary | ICD-10-CM

## 2023-05-30 DIAGNOSIS — K57.92 ACUTE DIVERTICULITIS: ICD-10-CM

## 2023-06-01 ENCOUNTER — OFFICE VISIT (OUTPATIENT)
Dept: PODIATRY | Facility: CLINIC | Age: 53
End: 2023-06-01

## 2023-06-01 ENCOUNTER — PATIENT MESSAGE (OUTPATIENT)
Dept: PODIATRY | Facility: CLINIC | Age: 53
End: 2023-06-01

## 2023-06-01 VITALS — BODY MASS INDEX: 33.68 KG/M2 | WEIGHT: 183 LBS | HEIGHT: 62 IN | RESPIRATION RATE: 20 BRPM

## 2023-06-01 DIAGNOSIS — M79.672 BILATERAL FOOT PAIN: Primary | ICD-10-CM

## 2023-06-01 DIAGNOSIS — M54.31 RIGHT SIDED SCIATICA: ICD-10-CM

## 2023-06-01 DIAGNOSIS — M21.6X2 ACQUIRED EQUINUS DEFORMITY OF BOTH FEET: ICD-10-CM

## 2023-06-01 DIAGNOSIS — M21.6X1 ACQUIRED EQUINUS DEFORMITY OF BOTH FEET: ICD-10-CM

## 2023-06-01 DIAGNOSIS — M20.11 HALLUX VALGUS, RIGHT: ICD-10-CM

## 2023-06-01 DIAGNOSIS — M20.41 HAMMER TOE OF SECOND TOE OF RIGHT FOOT: ICD-10-CM

## 2023-06-01 DIAGNOSIS — M77.41 METATARSALGIA, RIGHT FOOT: ICD-10-CM

## 2023-06-01 DIAGNOSIS — M79.671 BILATERAL FOOT PAIN: Primary | ICD-10-CM

## 2023-06-01 NOTE — PROGRESS NOTES
06/01/2023  Foot and Ankle Surgery - Established Patient/Follow-up  Provider: Dr. Leon Corbin DPM  Location: [facility]    Subjective:  Itzel Hogan is a 52 y.o. female.     Chief Complaint   Patient presents with   • Right Foot - Follow-up, Pain, Hammer Toe, Bunions   • Follow-up     MER CLEMENTE HAVEN  4/5/2023       The patient is a 52-year-old female who returns to the office today for a follow-up regarding bilateral foot pain.    The patient was last seen in 05/2023. She acknowledges her pain has not improved, and she localizes her current pain to the right 2nd metatarsophalangeal joint. The patient complains of pain to the plantar aspect of her right 2nd toe when walking and notes her symptoms are exacerbated with activity. She reports she has always had large calves as she was a runner, and she adds that she is dominant with her right lower extremity. The patient confirms she has been wearing inserts in her shoes, and she adds that she has been attending physical therapy. She reports she saw her physical therapist last Thursday, 05/25/2023, or Friday, 05/26/2023, and she was advised to hold off on physical therapy until her visit today. She notes that the Medrol Dosepak provided relief and resolved her sciatic pain.    Allergies   Allergen Reactions   • Penicillins Hives   • Prochlorperazine Dizziness   • Stadol [Butorphanol] Dizziness   • Cephalexin Hives       Current Outpatient Medications on File Prior to Visit   Medication Sig Dispense Refill   • atorvastatin (LIPITOR) 10 MG tablet TAKE 1 TABLET DAILY 90 tablet 3   • azelastine (ASTELIN) 0.1 % nasal spray 2 sprays into the nostril(s) as directed by provider 2 (Two) Times a Day As Needed.     • lisinopril (PRINIVIL,ZESTRIL) 10 MG tablet TAKE 1 TABLET DAILY 90 tablet 3   • omeprazole (priLOSEC) 20 MG capsule TAKE 1 CAPSULE DAILY 90 capsule 3   • Probiotic Product (Probiotic Advanced) capsule Take 1 capsule by mouth Daily. Specific for vaginal  "health     • [DISCONTINUED] levoFLOXacin (Levaquin) 750 MG tablet Take 1 tablet by mouth Daily. 7 tablet 0   • [DISCONTINUED] methylPREDNISolone (MEDROL) 4 MG dose pack Take as directed on package instructions. 21 tablet 0   • [DISCONTINUED] ondansetron (ZOFRAN) 4 MG tablet Take 1 tablet by mouth Every 6 (Six) Hours As Needed for Nausea or Vomiting. 30 tablet 0     No current facility-administered medications on file prior to visit.       Objective   Resp 20   Ht 157.5 cm (62\")   Wt 83 kg (183 lb)   BMI 33.47 kg/m²     Foot/Ankle Exam    GENERAL  Orientation:  AAOx3  Affect:  appropriate    VASCULAR     Right Foot Vascularity   Normal vascular exam    Dorsalis pedis:  2+  Posterior tibial:  2+  Skin temperature:  warm  Edema grading:  None  CFT:  < 3 seconds  Pedal hair growth:  Present  Varicosities:  none     Left Foot Vascularity   Normal vascular exam    Dorsalis pedis:  2+  Posterior tibial:  2+  Skin temperature:  warm  Edema grading:  None  CFT:  < 3 seconds  Pedal hair growth:  Present  Varicosities:  none     NEUROLOGIC     Right Foot Neurologic   Light touch sensation: normal  Hot/Cold sensation: normal  Achilles reflex:  2+     Left Foot Neurologic   Light touch sensation: normal  Hot/Cold sensation:  normal  Achilles reflex:  2+    MUSCULOSKELETAL     Right Foot Musculoskeletal   Arch:  Normal     Left Foot Musculoskeletal   Arch:  Normal    MUSCLE STRENGTH     Right Foot Muscle Strength   Normal strength    Foot dorsiflexion:  5  Foot plantar flexion:  5  Foot inversion:  5  Foot eversion:  5     Left Foot Muscle Strength   Normal strength    Foot dorsiflexion:  5  Foot plantar flexion:  5  Foot inversion:  5  Foot eversion:  5    DERMATOLOGIC      Right Foot Dermatologic   Skin  Right foot skin is intact.      Left Foot Dermatologic   Skin  Left foot skin is intact.     TESTS     Right Foot Tests   Anterior drawer: negative  Varus tilt: negative     Left Foot Tests   Anterior drawer: negative  Varus " tilt: negative     Right foot additional comments: 04/27/2023: Moderate equinus contracture with knee extended and flexed. Discomfort with palpation involving the plantar aspect of the second metatarsophalangeal joint region. Mild hammer digital deformity and mild bunion deformity with dorsal medial bony prominence.    06/01/2023  Continued discomfort involving the 2nd metatarsophalangeal joint of the right foot. Calf discomfort and significant equinus contracture with knee extended and flexed.        Assessment & Plan   Diagnoses and all orders for this visit:    1. Bilateral foot pain (Primary)    2. Metatarsalgia, right foot    3. Hammer toe of second toe of right foot    4. Hallux valgus, right    5. Acquired equinus deformity of both feet    6. Right sided sciatica      The patient returns to the office today for a follow-up regarding bilateral foot pain. No results were obtained or interpreted today. She presents with continued discomfort involving the 2nd metatarsophalangeal joint of the right foot. Explained her pain involves the soft tissues of her right foot. Discussed further treatment options may include possible surgical intervention. Reiterated her symptoms are likely attributed to her well-developed calf muscle causing excessive stress to the forefoot. Additionally, reiterated she may need restructuring of her bunion deformity to equalize the load. Recommended obtaining an MRI scan to assist in determining proper surgical intervention. Discussed gastrocnemius recession including the surgical procedure, risks, goals, and recovery in detail with the patient. Discussed continuing conservative management until she makes a decision regarding surgery. Recommended returning to wearing a boot, although explained this will likely aggravate her sciatica issues. Did recommend an Even-up device to attach to her left shoe to mitigate this aggravation. Advised patient to continue performing stretching exercises,  modify her activity as-needed, and avoid ambulating on uneven terrain. Additionally, recommended taking ibuprofen or Aleve, if tolerated, for pain management. The patient will return to the office in 2 weeks to discuss the MRI scan and surgical options in greater detail. Greater than 20 minutes was spent before, during, and after evaluation for patient care.    No orders of the defined types were placed in this encounter.         Note is dictated utilizing voice recognition software. Unfortunately this leads to occasional typographical errors. I apologize in advance if the situation occurs. If questions occur please do not hesitate to call our office.    Transcribed from ambient dictation for HERMES Corbin DPM by Dasia Panchal.  06/01/23   09:23 EDT    Patient or patient representative verbalized consent to the visit recording.  I have personally performed the services described in this document as transcribed by the above individual, and it is both accurate and complete.

## 2023-07-06 ENCOUNTER — ON CAMPUS - OUTPATIENT (AMBULATORY)
Dept: URBAN - METROPOLITAN AREA HOSPITAL 2 | Facility: HOSPITAL | Age: 53
End: 2023-07-06

## 2023-07-06 VITALS
TEMPERATURE: 99.1 F | DIASTOLIC BLOOD PRESSURE: 96 MMHG | DIASTOLIC BLOOD PRESSURE: 64 MMHG | WEIGHT: 179 LBS | DIASTOLIC BLOOD PRESSURE: 74 MMHG | DIASTOLIC BLOOD PRESSURE: 104 MMHG | SYSTOLIC BLOOD PRESSURE: 115 MMHG | DIASTOLIC BLOOD PRESSURE: 99 MMHG | RESPIRATION RATE: 16 BRPM | SYSTOLIC BLOOD PRESSURE: 142 MMHG | HEART RATE: 88 BPM | SYSTOLIC BLOOD PRESSURE: 121 MMHG | RESPIRATION RATE: 18 BRPM | HEART RATE: 97 BPM | SYSTOLIC BLOOD PRESSURE: 100 MMHG | DIASTOLIC BLOOD PRESSURE: 82 MMHG | HEART RATE: 84 BPM | SYSTOLIC BLOOD PRESSURE: 120 MMHG | HEART RATE: 77 BPM | DIASTOLIC BLOOD PRESSURE: 91 MMHG | HEART RATE: 82 BPM | HEART RATE: 85 BPM | HEIGHT: 62 IN | SYSTOLIC BLOOD PRESSURE: 135 MMHG | RESPIRATION RATE: 17 BRPM | OXYGEN SATURATION: 100 % | OXYGEN SATURATION: 99 %

## 2023-07-06 DIAGNOSIS — K57.30 DIVERTICULOSIS OF LARGE INTESTINE WITHOUT PERFORATION OR ABS: ICD-10-CM

## 2023-07-06 DIAGNOSIS — R93.3 ABNORMAL FINDINGS ON DIAGNOSTIC IMAGING OF OTHER PARTS OF DI: ICD-10-CM

## 2023-07-06 DIAGNOSIS — K64.1 SECOND DEGREE HEMORRHOIDS: ICD-10-CM

## 2023-07-06 DIAGNOSIS — R10.32 LEFT LOWER QUADRANT PAIN: ICD-10-CM

## 2023-07-06 PROCEDURE — 45378 DIAGNOSTIC COLONOSCOPY: CPT | Performed by: INTERNAL MEDICINE

## 2023-07-31 RX ORDER — AZELASTINE 1 MG/ML
2 SPRAY, METERED NASAL 2 TIMES DAILY PRN
Qty: 30 ML | Refills: 3 | Status: SHIPPED | OUTPATIENT
Start: 2023-07-31

## 2023-08-03 ENCOUNTER — OFFICE (AMBULATORY)
Dept: URBAN - METROPOLITAN AREA CLINIC 64 | Facility: CLINIC | Age: 53
End: 2023-08-03

## 2023-08-03 VITALS
WEIGHT: 182 LBS | DIASTOLIC BLOOD PRESSURE: 72 MMHG | HEIGHT: 62 IN | SYSTOLIC BLOOD PRESSURE: 111 MMHG | HEART RATE: 77 BPM

## 2023-08-03 DIAGNOSIS — K57.92 DIVERTICULITIS OF INTESTINE, PART UNSPECIFIED, WITHOUT PERFO: ICD-10-CM

## 2023-08-03 DIAGNOSIS — R93.3 ABNORMAL FINDINGS ON DIAGNOSTIC IMAGING OF OTHER PARTS OF DI: ICD-10-CM

## 2023-08-03 PROCEDURE — 99213 OFFICE O/P EST LOW 20 MIN: CPT | Performed by: INTERNAL MEDICINE

## 2023-08-03 RX ORDER — OMEPRAZOLE 20 MG/1
TABLET, DELAYED RELEASE ORAL
Qty: 90 | Refills: 4 | Status: ACTIVE

## 2023-08-10 ENCOUNTER — OFFICE VISIT (OUTPATIENT)
Dept: PODIATRY | Facility: CLINIC | Age: 53
End: 2023-08-10
Payer: COMMERCIAL

## 2023-08-10 VITALS — HEART RATE: 85 BPM | BODY MASS INDEX: 33.68 KG/M2 | WEIGHT: 183 LBS | OXYGEN SATURATION: 97 % | HEIGHT: 62 IN

## 2023-08-10 DIAGNOSIS — M77.41 METATARSALGIA, RIGHT FOOT: ICD-10-CM

## 2023-08-10 DIAGNOSIS — M87.076 AVASCULAR NECROSIS OF BONE OF FOOT: ICD-10-CM

## 2023-08-10 DIAGNOSIS — S99.921S PLANTAR PLATE INJURY, RIGHT, SEQUELA: ICD-10-CM

## 2023-08-10 DIAGNOSIS — M79.671 RIGHT FOOT PAIN: Primary | ICD-10-CM

## 2023-08-10 NOTE — PROGRESS NOTES
"08/10/2023  Foot and Ankle Surgery - Established Patient/Follow-up  Provider: Dr. Leon Corbin, MEKA  Location: [facility]    Subjective:  Itzel Hogan is a 52 y.o. female.     Chief Complaint   Patient presents with    Right Foot - Follow-up    Establish Care     VIMAL OROURKE date unknown       HPI: The patient is a 52-year-old female who presents to the clinic for follow-up regarding her right foot.    She reports significant improvement and denies experiencing any pain. She acknowledges she is wearing the inserts.    Allergies   Allergen Reactions    Penicillins Hives    Prochlorperazine Dizziness    Stadol [Butorphanol] Dizziness    Cephalexin Hives       Current Outpatient Medications on File Prior to Visit   Medication Sig Dispense Refill    atorvastatin (LIPITOR) 10 MG tablet TAKE 1 TABLET DAILY 90 tablet 3    azelastine (ASTELIN) 0.1 % nasal spray 2 sprays into the nostril(s) as directed by provider 2 (Two) Times a Day As Needed for Allergies. 30 mL 3    lisinopril (PRINIVIL,ZESTRIL) 10 MG tablet TAKE 1 TABLET DAILY 90 tablet 3    omeprazole (priLOSEC) 20 MG capsule TAKE 1 CAPSULE DAILY 90 capsule 3    Probiotic Product (Probiotic Advanced) capsule Take 1 capsule by mouth Daily. Specific for vaginal health       No current facility-administered medications on file prior to visit.       Objective   Pulse 85   Ht 157.5 cm (62\")   Wt 83 kg (183 lb)   SpO2 97%   BMI 33.47 kg/mý     Foot/Ankle Exam  GENERAL  Orientation:  AAOx3  Affect:  appropriate     VASCULAR      Right Foot Vascularity   Normal vascular exam    Dorsalis pedis:  2+  Posterior tibial:  2+  Skin temperature:  warm  Edema grading:  None  CFT:  < 3 seconds  Pedal hair growth:  Present  Varicosities:  none      Left Foot Vascularity   Normal vascular exam    Dorsalis pedis:  2+  Posterior tibial:  2+  Skin temperature:  warm  Edema grading:  None  CFT:  < 3 seconds  Pedal hair growth:  Present  Varicosities:  none   "   NEUROLOGIC      Right Foot Neurologic   Light touch sensation: normal  Hot/Cold sensation: normal  Achilles reflex:  2+      Left Foot Neurologic   Light touch sensation: normal  Hot/Cold sensation:  normal  Achilles reflex:  2+     MUSCULOSKELETAL      Right Foot Musculoskeletal   Arch:  Normal      Left Foot Musculoskeletal   Arch:  Normal     MUSCLE STRENGTH      Right Foot Muscle Strength   Normal strength    Foot dorsiflexion:  5  Foot plantar flexion:  5  Foot inversion:  5  Foot eversion:  5      Left Foot Muscle Strength   Normal strength    Foot dorsiflexion:  5  Foot plantar flexion:  5  Foot inversion:  5  Foot eversion:  5     DERMATOLOGIC       Right Foot Dermatologic   Skin  Right foot skin is intact.       Left Foot Dermatologic   Skin  Left foot skin is intact.      TESTS      Right Foot Tests   Anterior drawer: negative  Varus tilt: negative      Left Foot Tests   Anterior drawer: negative  Varus tilt: negative     Right foot additional comments: 04/27/2023: Moderate equinus contracture with knee extended and flexed. Discomfort with palpation involving the plantar aspect of the second metatarsophalangeal joint region. Mild hammer digital deformity and mild bunion deformity with dorsal medial bony prominence.     06/01/2023  Continued discomfort involving the 2nd metatarsophalangeal joint of the right foot. Calf discomfort and significant equinus contracture with knee extended and flexed.     07/13/2023  Significant discomfort with palpation involving the 2nd metatarsophalangeal joint region. Mild progressive hammertoe deformity involving the right 2nd digit.    08/10/2023  Less discomfort with palpation involving the second metatarsophalangeal joint region.    Assessment & Plan   Diagnoses and all orders for this visit:    1. Right foot pain (Primary)    2. Metatarsalgia, right foot    3. Plantar plate injury, right, sequela    4. Avascular necrosis of bone of foot        The patient is a  52-year-old female who presents to the office today for a follow-up regarding her right foot. She reports improvement since her last visit. We discussed the etiology and biomechanics involved with her right foot pain. I recommend wearing supportive shoes and to avoid uneven terrain and aggressive activity. The patient will return to the office as needed. Greater than 20 minutes was spent before, during, and after evaluation for patient care.    No orders of the defined types were placed in this encounter.         Note is dictated utilizing voice recognition software. Unfortunately this leads to occasional typographical errors. I apologize in advance if the situation occurs. If questions occur please do not hesitate to call our office.    Transcribed from ambient dictation for HERMES Corbin DPM by Juanita Romero.  08/10/23   14:58 EDT    Patient or patient representative verbalized consent to the visit recording.  I have personally performed the services described in this document as transcribed by the above individual, and it is both accurate and complete.

## 2023-09-26 ENCOUNTER — OFFICE VISIT (OUTPATIENT)
Dept: FAMILY MEDICINE CLINIC | Facility: CLINIC | Age: 53
End: 2023-09-26
Payer: COMMERCIAL

## 2023-09-26 VITALS
DIASTOLIC BLOOD PRESSURE: 82 MMHG | SYSTOLIC BLOOD PRESSURE: 124 MMHG | HEIGHT: 62 IN | OXYGEN SATURATION: 99 % | HEART RATE: 89 BPM | BODY MASS INDEX: 33.49 KG/M2 | WEIGHT: 182 LBS

## 2023-09-26 DIAGNOSIS — I10 PRIMARY HYPERTENSION: Primary | ICD-10-CM

## 2023-09-26 DIAGNOSIS — E78.2 MIXED HYPERLIPIDEMIA: ICD-10-CM

## 2023-09-26 DIAGNOSIS — K21.9 GASTROESOPHAGEAL REFLUX DISEASE, UNSPECIFIED WHETHER ESOPHAGITIS PRESENT: ICD-10-CM

## 2023-09-26 DIAGNOSIS — R25.2 MUSCLE CRAMPS: ICD-10-CM

## 2023-09-26 DIAGNOSIS — R53.83 FATIGUE, UNSPECIFIED TYPE: ICD-10-CM

## 2023-09-26 PROCEDURE — 99214 OFFICE O/P EST MOD 30 MIN: CPT | Performed by: NURSE PRACTITIONER

## 2023-09-26 NOTE — PROGRESS NOTES
Subjective   Itzel Hogan is a 52 y.o. female.       HPI   Pt is here today for 6 month follow up.   1) HTN - currently on lisinopril 10 mg daily.  BP stable.  Denies any CP; palpations; SOA; dizziness; headache; trouble with vision.    2) Hyperlipidemia - currently on atorvastatin 10 mg daily. No concerns.     3) GERD - currently on omeprazole 20 mg daily. Symptoms stable.    4) Having muscle cramps in left leg.  No swelling, redness or warmth noted.   5) Has had increase in fatigue.  Has had anemia in the past; not currently on any supplements.     The following portions of the patient's history were reviewed and updated as appropriate: allergies, current medications, past family history, past medical history, past social history, past surgical history, and problem list.    Review of Systems   Constitutional:  Positive for fatigue. Negative for chills and fever.   Respiratory:  Negative for cough, shortness of breath and wheezing.    Cardiovascular:  Negative for chest pain and palpitations.   Gastrointestinal:  Negative for diarrhea, nausea and vomiting.   Genitourinary:  Negative for dysuria, frequency, hematuria and urgency.   Musculoskeletal:  Positive for myalgias.   Skin:  Negative for color change, rash and bruise.   Neurological:  Negative for dizziness, weakness, light-headedness and headache.   Psychiatric/Behavioral:  Negative for depressed mood. The patient is not nervous/anxious.      Objective   Physical Exam  Vitals reviewed.   Constitutional:       General: She is not in acute distress.     Appearance: Normal appearance. She is obese.   Cardiovascular:      Rate and Rhythm: Normal rate and regular rhythm.      Pulses: Normal pulses.      Heart sounds: Normal heart sounds. No murmur heard.  Pulmonary:      Effort: Pulmonary effort is normal. No respiratory distress.      Breath sounds: Normal breath sounds. No wheezing or rhonchi.   Chest:      Chest wall: No tenderness.   Abdominal:       Tenderness: There is no right CVA tenderness or left CVA tenderness.   Musculoskeletal:      Right lower leg: No edema.      Left lower leg: No edema.   Skin:     General: Skin is warm and dry.      Findings: No erythema.   Neurological:      General: No focal deficit present.      Mental Status: She is alert and oriented to person, place, and time.   Psychiatric:         Mood and Affect: Mood normal.                Assessment & Plan   Diagnoses and all orders for this visit:    1. Primary hypertension (Primary)  Comments:  Stable.   Cont. current medication .  Labs ordered.   RTO in 6 mo.  Orders:  -     Comprehensive metabolic panel; Future  -     Lipid panel; Future    2. Mixed hyperlipidemia  Comments:  Stable.   Cont. current medication .  Labs ordered.   RTO in 6 mo.  Orders:  -     Comprehensive metabolic panel; Future  -     Lipid panel; Future    3. Gastroesophageal reflux disease, unspecified whether esophagitis present  Comments:  Stable.   Cont. current medication .  Labs ordered.   RTO in 6 mo.  Orders:  -     Comprehensive metabolic panel; Future  -     Lipid panel; Future    4. Fatigue, unspecified type  Comments:  Labs ordered.  Orders:  -     CBC w AUTO Differential; Future  -     Iron and TIBC; Future  -     Ferritin; Future    5. Muscle cramps  Comments:  Labs ordered  Orders:  -     Vitamin B12; Future  -     Vitamin D 25 hydroxy; Future  -     Magnesium; Future

## 2023-09-29 ENCOUNTER — LAB (OUTPATIENT)
Dept: FAMILY MEDICINE CLINIC | Facility: CLINIC | Age: 53
End: 2023-09-29
Payer: COMMERCIAL

## 2023-09-29 DIAGNOSIS — E78.2 MIXED HYPERLIPIDEMIA: ICD-10-CM

## 2023-09-29 DIAGNOSIS — K21.9 GASTROESOPHAGEAL REFLUX DISEASE, UNSPECIFIED WHETHER ESOPHAGITIS PRESENT: ICD-10-CM

## 2023-09-29 DIAGNOSIS — R53.83 FATIGUE, UNSPECIFIED TYPE: ICD-10-CM

## 2023-09-29 DIAGNOSIS — I10 PRIMARY HYPERTENSION: ICD-10-CM

## 2023-09-29 DIAGNOSIS — R25.2 MUSCLE CRAMPS: ICD-10-CM

## 2023-09-29 LAB
25(OH)D3 SERPL-MCNC: 52.7 NG/ML (ref 30–100)
ALBUMIN SERPL-MCNC: 4.5 G/DL (ref 3.5–5.2)
ALBUMIN/GLOB SERPL: 1.6 G/DL
ALP SERPL-CCNC: 83 U/L (ref 39–117)
ALT SERPL W P-5'-P-CCNC: 23 U/L (ref 1–33)
ANION GAP SERPL CALCULATED.3IONS-SCNC: 8.3 MMOL/L (ref 5–15)
AST SERPL-CCNC: 24 U/L (ref 1–32)
BASOPHILS # BLD AUTO: 0.03 10*3/MM3 (ref 0–0.2)
BASOPHILS NFR BLD AUTO: 0.6 % (ref 0–1.5)
BILIRUB SERPL-MCNC: 0.4 MG/DL (ref 0–1.2)
BUN SERPL-MCNC: 14 MG/DL (ref 6–20)
BUN/CREAT SERPL: 20.9 (ref 7–25)
CALCIUM SPEC-SCNC: 9.6 MG/DL (ref 8.6–10.5)
CHLORIDE SERPL-SCNC: 101 MMOL/L (ref 98–107)
CHOLEST SERPL-MCNC: 166 MG/DL (ref 0–200)
CO2 SERPL-SCNC: 27.7 MMOL/L (ref 22–29)
CREAT SERPL-MCNC: 0.67 MG/DL (ref 0.57–1)
DEPRECATED RDW RBC AUTO: 40.8 FL (ref 37–54)
EGFRCR SERPLBLD CKD-EPI 2021: 106 ML/MIN/1.73
EOSINOPHIL # BLD AUTO: 0.1 10*3/MM3 (ref 0–0.4)
EOSINOPHIL NFR BLD AUTO: 1.9 % (ref 0.3–6.2)
ERYTHROCYTE [DISTWIDTH] IN BLOOD BY AUTOMATED COUNT: 13.9 % (ref 12.3–15.4)
FERRITIN SERPL-MCNC: 8.53 NG/ML (ref 13–150)
GLOBULIN UR ELPH-MCNC: 2.9 GM/DL
GLUCOSE SERPL-MCNC: 103 MG/DL (ref 65–99)
HCT VFR BLD AUTO: 38.4 % (ref 34–46.6)
HDLC SERPL-MCNC: 65 MG/DL (ref 40–60)
HGB BLD-MCNC: 12.2 G/DL (ref 12–15.9)
IMM GRANULOCYTES # BLD AUTO: 0.01 10*3/MM3 (ref 0–0.05)
IMM GRANULOCYTES NFR BLD AUTO: 0.2 % (ref 0–0.5)
IRON 24H UR-MRATE: 45 MCG/DL (ref 37–145)
IRON SATN MFR SERPL: 9 % (ref 20–50)
LDLC SERPL CALC-MCNC: 86 MG/DL (ref 0–100)
LDLC/HDLC SERPL: 1.3 {RATIO}
LYMPHOCYTES # BLD AUTO: 1.38 10*3/MM3 (ref 0.7–3.1)
LYMPHOCYTES NFR BLD AUTO: 25.6 % (ref 19.6–45.3)
MAGNESIUM SERPL-MCNC: 2 MG/DL (ref 1.6–2.6)
MCH RBC QN AUTO: 25.9 PG (ref 26.6–33)
MCHC RBC AUTO-ENTMCNC: 31.8 G/DL (ref 31.5–35.7)
MCV RBC AUTO: 81.5 FL (ref 79–97)
MONOCYTES # BLD AUTO: 0.4 10*3/MM3 (ref 0.1–0.9)
MONOCYTES NFR BLD AUTO: 7.4 % (ref 5–12)
NEUTROPHILS NFR BLD AUTO: 3.48 10*3/MM3 (ref 1.7–7)
NEUTROPHILS NFR BLD AUTO: 64.3 % (ref 42.7–76)
NRBC BLD AUTO-RTO: 0 /100 WBC (ref 0–0.2)
PLATELET # BLD AUTO: 299 10*3/MM3 (ref 140–450)
PMV BLD AUTO: 11.1 FL (ref 6–12)
POTASSIUM SERPL-SCNC: 4.4 MMOL/L (ref 3.5–5.2)
PROT SERPL-MCNC: 7.4 G/DL (ref 6–8.5)
RBC # BLD AUTO: 4.71 10*6/MM3 (ref 3.77–5.28)
SODIUM SERPL-SCNC: 137 MMOL/L (ref 136–145)
TIBC SERPL-MCNC: 517 MCG/DL (ref 298–536)
TRANSFERRIN SERPL-MCNC: 347 MG/DL (ref 200–360)
TRIGL SERPL-MCNC: 81 MG/DL (ref 0–150)
VIT B12 BLD-MCNC: 426 PG/ML (ref 211–946)
VLDLC SERPL-MCNC: 15 MG/DL (ref 5–40)
WBC NRBC COR # BLD: 5.4 10*3/MM3 (ref 3.4–10.8)

## 2023-09-29 PROCEDURE — 82728 ASSAY OF FERRITIN: CPT | Performed by: NURSE PRACTITIONER

## 2023-09-29 PROCEDURE — 82607 VITAMIN B-12: CPT | Performed by: NURSE PRACTITIONER

## 2023-09-29 PROCEDURE — 85025 COMPLETE CBC W/AUTO DIFF WBC: CPT | Performed by: NURSE PRACTITIONER

## 2023-09-29 PROCEDURE — 82306 VITAMIN D 25 HYDROXY: CPT | Performed by: NURSE PRACTITIONER

## 2023-09-29 PROCEDURE — 36415 COLL VENOUS BLD VENIPUNCTURE: CPT

## 2023-09-29 PROCEDURE — 80061 LIPID PANEL: CPT | Performed by: NURSE PRACTITIONER

## 2023-09-29 PROCEDURE — 83735 ASSAY OF MAGNESIUM: CPT | Performed by: NURSE PRACTITIONER

## 2023-09-29 PROCEDURE — 83540 ASSAY OF IRON: CPT | Performed by: NURSE PRACTITIONER

## 2023-09-29 PROCEDURE — 84466 ASSAY OF TRANSFERRIN: CPT | Performed by: NURSE PRACTITIONER

## 2023-09-29 PROCEDURE — 80053 COMPREHEN METABOLIC PANEL: CPT | Performed by: NURSE PRACTITIONER

## 2023-09-30 DIAGNOSIS — D50.9 IRON DEFICIENCY ANEMIA, UNSPECIFIED IRON DEFICIENCY ANEMIA TYPE: Primary | ICD-10-CM

## 2023-09-30 RX ORDER — FERROUS SULFATE 325(65) MG
325 TABLET ORAL
Qty: 30 TABLET | Refills: 3 | Status: SHIPPED | OUTPATIENT
Start: 2023-09-30

## 2023-10-13 ENCOUNTER — CLINICAL SUPPORT (OUTPATIENT)
Dept: FAMILY MEDICINE CLINIC | Facility: CLINIC | Age: 53
End: 2023-10-13
Payer: COMMERCIAL

## 2023-10-13 DIAGNOSIS — Z23 NEED FOR INFLUENZA VACCINATION: Primary | ICD-10-CM

## 2023-10-13 PROCEDURE — 90686 IIV4 VACC NO PRSV 0.5 ML IM: CPT | Performed by: NURSE PRACTITIONER

## 2023-10-13 PROCEDURE — 90471 IMMUNIZATION ADMIN: CPT | Performed by: NURSE PRACTITIONER

## 2023-10-23 ENCOUNTER — OFFICE VISIT (OUTPATIENT)
Dept: FAMILY MEDICINE CLINIC | Facility: CLINIC | Age: 53
End: 2023-10-23
Payer: COMMERCIAL

## 2023-10-23 VITALS
BODY MASS INDEX: 33.86 KG/M2 | DIASTOLIC BLOOD PRESSURE: 104 MMHG | OXYGEN SATURATION: 100 % | SYSTOLIC BLOOD PRESSURE: 141 MMHG | HEIGHT: 62 IN | WEIGHT: 184 LBS | HEART RATE: 86 BPM

## 2023-10-23 DIAGNOSIS — T78.3XXS ANGIOEDEMA, SEQUELA: Primary | ICD-10-CM

## 2023-10-23 DIAGNOSIS — I10 PRIMARY HYPERTENSION: ICD-10-CM

## 2023-10-23 RX ORDER — AMLODIPINE BESYLATE 5 MG/1
5 TABLET ORAL DAILY
Qty: 30 TABLET | Refills: 3 | Status: SHIPPED | OUTPATIENT
Start: 2023-10-23

## 2023-10-23 RX ORDER — PREDNISONE 50 MG/1
TABLET ORAL
COMMUNITY
Start: 2023-10-22

## 2023-10-23 RX ORDER — TRETINOIN 1 MG/G
CREAM TOPICAL
COMMUNITY
Start: 2023-10-02

## 2023-10-23 NOTE — PROGRESS NOTES
Subjective   Itzel Hogan is a 52 y.o. female.       HPI   Pt is here today for ER follow up from 10/21.  Was seen at ER at [facility].    Had hives on back of neck and noticed lips and mouth swelling.  Says ER thought this was likely due to her lisinopril.  She also notes she had eaten peanut butter crackers and a peach prior to the symptoms also.  She was given prednisone, benadryl and famotidine by ER.    Lisinopril has been stopped.  BP today is 141/104.  She does report headache.  Denies any CP; palpations; SOA; trouble breathing; dizziness.  Swelling has resolved.  No further rash.     The following portions of the patient's history were reviewed and updated as appropriate: allergies, current medications, past family history, past medical history, past social history, past surgical history, and problem list.    Review of Systems   Constitutional:  Negative for chills, fatigue and fever.   HENT:  Negative for facial swelling, sore throat and trouble swallowing.    Respiratory:  Negative for cough, chest tightness, shortness of breath and wheezing.    Cardiovascular:  Negative for chest pain, palpitations and leg swelling.   Gastrointestinal:  Negative for diarrhea, nausea and vomiting.   Genitourinary:  Negative for dysuria, frequency and urgency.   Neurological:  Positive for headache. Negative for dizziness, weakness and light-headedness.   Psychiatric/Behavioral:  Negative for depressed mood. The patient is not nervous/anxious.        Objective   Physical Exam  Vitals reviewed.   Constitutional:       General: She is not in acute distress.     Appearance: Normal appearance.   HENT:      Head: Normocephalic and atraumatic.      Right Ear: External ear normal.      Left Ear: External ear normal.      Nose: Nose normal.      Mouth/Throat:      Lips: Pink.      Mouth: Mucous membranes are moist. No angioedema.      Pharynx: Oropharynx is clear. No oropharyngeal exudate or posterior oropharyngeal  erythema.   Cardiovascular:      Rate and Rhythm: Normal rate and regular rhythm.      Pulses: Normal pulses.      Heart sounds: Normal heart sounds. No murmur heard.  Pulmonary:      Effort: Pulmonary effort is normal. No respiratory distress.      Breath sounds: Normal breath sounds. No wheezing or rhonchi.   Chest:      Chest wall: No tenderness.   Abdominal:      Tenderness: There is no right CVA tenderness or left CVA tenderness.   Skin:     General: Skin is warm and dry.      Findings: No erythema or rash.   Neurological:      General: No focal deficit present.      Mental Status: She is alert and oriented to person, place, and time.   Psychiatric:         Mood and Affect: Mood normal.                  Assessment & Plan   Diagnoses and all orders for this visit:    1. Angioedema, sequela (Primary)  Comments:  Lisinorpil stopped.   Finish all prednisone given.   If any symptoms return, report to ER.  Pt to follow up with her allergist.    2. Primary hypertension  Comments:  Given amlodipine 5 mg daily.   Monitor BP at home; call in reading in 2 weeks.   RTO with next follow up.  Orders:  -     amLODIPine (NORVASC) 5 MG tablet; Take 1 tablet by mouth Daily.  Dispense: 30 tablet; Refill: 3

## 2023-10-28 NOTE — PROGRESS NOTES
Patient being discharged, patient ambulated with steady gait, speaking clear sentence. Patient A0x4. Patient escorted out of ED with [employer]. Subjective   Itzel Hogan is a 52 y.o. female.       HPI   Pt is here today for 6 month follow up.   1) HTN - currently on lisinopril 10 mg daily.  BP stable.  Denies any CP; palpations; SOA; dizziness; headache; trouble with vision.    2) Hyperlipidemia - currently on atorvastatin 10 mg daily.    3) GERD - currently on omeprazole 20 mg daily. Symptoms stable.    4) Anxiety - currently on hydroxyzine 25 mg tid PRN. Symptoms stable.     The following portions of the patient's history were reviewed and updated as appropriate: allergies, current medications, past family history, past medical history, past social history, past surgical history and problem list.    Review of Systems   Constitutional: Negative for chills, fatigue and fever.   Respiratory: Negative for cough, shortness of breath and wheezing.    Cardiovascular: Negative for chest pain and palpitations.   Gastrointestinal: Negative for diarrhea, nausea and vomiting.   Genitourinary: Negative for dysuria, frequency, hematuria and urgency.   Neurological: Negative for dizziness, weakness, light-headedness and headache.   Psychiatric/Behavioral: Negative for depressed mood. The patient is not nervous/anxious.        Objective   Physical Exam  Vitals reviewed.   Constitutional:       General: She is not in acute distress.     Appearance: Normal appearance.   HENT:      Head: Normocephalic and atraumatic.      Right Ear: Tympanic membrane, ear canal and external ear normal.      Left Ear: Tympanic membrane, ear canal and external ear normal.   Cardiovascular:      Rate and Rhythm: Normal rate and regular rhythm.      Pulses: Normal pulses.      Heart sounds: Normal heart sounds. No murmur heard.  Pulmonary:      Effort: Pulmonary effort is normal. No respiratory distress.      Breath sounds: Normal breath sounds. No wheezing or rhonchi.   Chest:      Chest wall: No tenderness.   Abdominal:      Tenderness: There is no right CVA tenderness or left CVA  tenderness.   Musculoskeletal:      Cervical back: Normal range of motion and neck supple.   Skin:     General: Skin is warm and dry.      Findings: No erythema.   Neurological:      General: No focal deficit present.      Mental Status: She is alert and oriented to person, place, and time.   Psychiatric:         Mood and Affect: Mood normal.           Assessment & Plan   Diagnoses and all orders for this visit:    1. Essential hypertension (Primary)  Comments:  Stable.   Cont. current medication.   Labs ordered.   RTO in 6 mo.   Orders:  -     Comprehensive metabolic panel; Future  -     Lipid panel; Future    2. Mixed hyperlipidemia  Comments:  Stable.   Cont. current medication.   Labs ordered.   RTO in 6 mo.   Orders:  -     Comprehensive metabolic panel; Future  -     Lipid panel; Future    3. Gastroesophageal reflux disease, unspecified whether esophagitis present  Comments:  Stable.   Cont. current medication.   Labs ordered.   RTO in 6 mo.     4. Anxiety  Comments:  Stable.   Cont. current medication.   Labs ordered.   RTO in 6 mo.               High BMI Plan  General weight loss/lifestyle modification strategies discussed (elicit support from others; identify saboteurs; non-food rewards, etc).  Informal exercise measures discussed, e.g. taking stairs instead of elevator.

## 2023-11-15 ENCOUNTER — HOSPITAL ENCOUNTER (EMERGENCY)
Facility: HOSPITAL | Age: 53
Discharge: HOME | End: 2023-11-15
Attending: EMERGENCY MEDICINE
Payer: COMMERCIAL

## 2023-11-15 VITALS
DIASTOLIC BLOOD PRESSURE: 82 MMHG | RESPIRATION RATE: 16 BRPM | SYSTOLIC BLOOD PRESSURE: 122 MMHG | HEIGHT: 62 IN | WEIGHT: 185.41 LBS | HEART RATE: 76 BPM | OXYGEN SATURATION: 98 % | TEMPERATURE: 97.5 F | BODY MASS INDEX: 34.12 KG/M2

## 2023-11-15 DIAGNOSIS — L50.9 URTICARIA: Primary | ICD-10-CM

## 2023-11-15 PROCEDURE — 96374 THER/PROPH/DIAG INJ IV PUSH: CPT

## 2023-11-15 PROCEDURE — 99283 EMERGENCY DEPT VISIT LOW MDM: CPT

## 2023-11-15 PROCEDURE — 25010000002 METHYLPREDNISOLONE PER 125 MG: Performed by: PHYSICIAN ASSISTANT

## 2023-11-15 PROCEDURE — 96375 TX/PRO/DX INJ NEW DRUG ADDON: CPT

## 2023-11-15 RX ORDER — FAMOTIDINE 10 MG/ML
20 INJECTION, SOLUTION INTRAVENOUS ONCE
Status: COMPLETED | OUTPATIENT
Start: 2023-11-15 | End: 2023-11-15

## 2023-11-15 RX ORDER — METHYLPREDNISOLONE 4 MG/1
TABLET ORAL
Qty: 21 TABLET | Refills: 0 | Status: SHIPPED | OUTPATIENT
Start: 2023-11-15

## 2023-11-15 RX ORDER — SODIUM CHLORIDE 0.9 % (FLUSH) 0.9 %
10 SYRINGE (ML) INJECTION AS NEEDED
Status: DISCONTINUED | OUTPATIENT
Start: 2023-11-15 | End: 2023-11-15 | Stop reason: HOSPADM

## 2023-11-15 RX ORDER — METHYLPREDNISOLONE SODIUM SUCCINATE 125 MG/2ML
125 INJECTION, POWDER, LYOPHILIZED, FOR SOLUTION INTRAMUSCULAR; INTRAVENOUS ONCE
Status: COMPLETED | OUTPATIENT
Start: 2023-11-15 | End: 2023-11-15

## 2023-11-15 RX ADMIN — FAMOTIDINE 20 MG: 10 INJECTION INTRAVENOUS at 03:37

## 2023-11-15 RX ADMIN — METHYLPREDNISOLONE SODIUM SUCCINATE 125 MG: 125 INJECTION, POWDER, FOR SOLUTION INTRAMUSCULAR; INTRAVENOUS at 03:37

## 2023-11-15 NOTE — DISCHARGE INSTRUCTIONS
Take Tylenol as needed for pain.  May take Benadryl as needed for itching or hives  You may take Pepcid at home as well.    Take Medrol Dosepak to completion.    Follow-up with primary care or allergist for recheck    Return to the ER for new or worsening symptoms

## 2023-11-15 NOTE — ED PROVIDER NOTES
Subjective   History of Present Illness  Chief Complaint: Hives    Patient is a 52-year-old  female history of GERD, allergies, anemia presents to the ER with complaints of hives that started this evening.  She reports hives on both of her shoulders, she states he took 25 mg of Benadryl before she came to the ER and reports that her itching and hives have somewhat improved.  She states about 1 month ago she was seen in the emergency department for allergic reaction which she suspected was due to lisinopril, she had lip and throat swelling and was taken off lisinopril and placed on Norvasc.  She states that she did follow-up with ENT allergist and was told that she had seasonal allergies but no other specific allergies.  Denies any new medications on detergents soaps clothing foods in the last 2 weeks.  No fever or chills.  No shortness of breath wheezing or difficulty swallowing.  No lip swelling today.    PCP: Keily Rodas    History provided by:  Patient      Review of Systems   Constitutional:  Negative for chills and fever.   HENT:  Negative for sore throat and trouble swallowing.    Respiratory:  Negative for shortness of breath and wheezing.    Cardiovascular:  Negative for chest pain.   Gastrointestinal:  Negative for abdominal pain.   Genitourinary:  Negative for dysuria.   Skin:  Positive for rash.   Neurological:  Negative for headaches.   Psychiatric/Behavioral:  Negative for behavioral problems.    All other systems reviewed and are negative.      Past Medical History:   Diagnosis Date    Allergic     Allergies     Anemia     B12 deficiency     Bunion     Difficulty walking     Diverticulosis April 1 , 2023    Fallen arches     GERD (gastroesophageal reflux disease)     Hammer toe     Hyperlipidemia     Hypertension     Migraine        Allergies   Allergen Reactions    Penicillins Hives    Prochlorperazine Dizziness    Stadol [Butorphanol] Dizziness    Lisinopril Swelling    Cephalexin Hives        Past Surgical History:   Procedure Laterality Date    CARPAL TUNNEL RELEASE Bilateral     COLONOSCOPY      ENDOSCOPY      SINUS SURGERY      TONSILLECTOMY      WISDOM TOOTH EXTRACTION         Family History   Problem Relation Age of Onset    Stroke Mother     Heart failure Mother     Cancer Mother     Asthma Mother     Hyperlipidemia Mother     Hypertension Mother     Skin cancer Father     Anemia Father     Asthma Sister     Heart attack Maternal Grandmother     Heart attack Maternal Grandfather        Social History     Socioeconomic History    Marital status:    Tobacco Use    Smoking status: Never    Smokeless tobacco: Never   Vaping Use    Vaping Use: Never used   Substance and Sexual Activity    Alcohol use: Not Currently     Comment: occasionally    Drug use: Never    Sexual activity: Yes     Partners: Male     Birth control/protection: Condom           Objective   Physical Exam  Vitals and nursing note reviewed.   Constitutional:       Appearance: Normal appearance. She is well-developed and normal weight. She is not ill-appearing or toxic-appearing.   HENT:      Head: Normocephalic and atraumatic.   Eyes:      Pupils: Pupils are equal, round, and reactive to light.   Cardiovascular:      Rate and Rhythm: Normal rate and regular rhythm.      Pulses: Normal pulses.      Heart sounds: Normal heart sounds. No murmur heard.  Pulmonary:      Effort: Pulmonary effort is normal. No respiratory distress.      Breath sounds: Normal breath sounds. No wheezing.   Abdominal:      General: Bowel sounds are normal. There is no distension.      Palpations: Abdomen is soft.      Tenderness: There is no abdominal tenderness.   Skin:     General: Skin is warm and dry.      Capillary Refill: Capillary refill takes less than 2 seconds.      Findings: Rash present.      Comments: Mild urticaria bilateral shoulders   Neurological:      General: No focal deficit present.      Mental Status: She is alert and oriented  "to person, place, and time.   Psychiatric:         Mood and Affect: Mood normal.         Behavior: Behavior normal.         Procedures           ED Course    /82   Pulse 76   Temp 97.5 °F (36.4 °C) (Oral)   Resp 16   Ht 157.5 cm (62\")   Wt 84.1 kg (185 lb 6.5 oz)   LMP 07/06/2023 (Approximate)   SpO2 98%   BMI 33.91 kg/m²   Labs Reviewed - No data to display  Medications   sodium chloride 0.9 % flush 10 mL (has no administration in time range)   methylPREDNISolone sodium succinate (SOLU-Medrol) injection 125 mg (125 mg Intravenous Given 11/15/23 0337)   famotidine (PEPCID) injection 20 mg (20 mg Intravenous Given 11/15/23 0337)     No radiology results for the last day                                         Medical Decision Making  Differential Dx (Includes but not limited to): Urticaria, rash, cellulitis  Medical Records Reviewed: Patient seen in the emergency department about 1 month ago for similar complaints, this is felt to be due to either peanuts or lisinopril.  She was taken off lisinopril.  Labs: N/A  Imaging: N/A  Telemetry: N/A  Testing considered but not ordered: Chest x-ray patient denies chest pain wheezing or shortness of breath  Nature of Complaint: Acute  Admission vs Discharge: Discharge  Discussion: While in the ED IV was placed appropriate PPE was worn during exam and throughout all encounters with the patient.  Patient was given Solu-Medrol and Pepcid, she had taken Benadryl prior to ER arrival.  Hives improved.  Vital signs stable.  No respiratory distress.  Patient be discharged with prescription for Medrol Dosepak.    Discharge plan and instructions were discussed with the patient who verbalized understanding and is in agreement with the plan, all questions were answered at this time.  Patient is aware of signs symptoms that would require immediate return to the emergency room.  Patient understands importance of following up with primary care provider for further evaluation and " worsening concerns as well as blood pressure recheck in the next 4 weeks.    Patient was discharged in improved stable condition with an upright steady gait.    Patient is aware that discharge does not mean that nothing is wrong but indicates no emergencies present and they must continue care with follow-up as given below or physician of their choice.    Problems Addressed:  Urticaria: acute illness or injury    Amount and/or Complexity of Data Reviewed  External Data Reviewed: notes.    Risk  Prescription drug management.        Final diagnoses:   Urticaria       ED Disposition  ED Disposition       ED Disposition   Discharge    Condition   Stable    Comment   --               Keily Rodas, APRN  [address]  [address]  [place] [address] [phone]    Schedule an appointment as soon as possible for a visit in 2 days  As needed, If symptoms worsen         Medication List        New Prescriptions      methylPREDNISolone 4 MG dose pack  Commonly known as: MEDROL  Take as directed on package instructions.            Stop      predniSONE 50 MG tablet  Commonly known as: DELTASONE               Where to Get Your Medications        These medications were sent to [id number] - [place], [place] - [address] AT [facility] [address] - [phone] PH - [phone] FX  [address] [place] [address]      Phone: [phone]   methylPREDNISolone 4 MG dose pack            Dolores Tian PA  11/15/23 3131

## 2023-11-16 DIAGNOSIS — I10 PRIMARY HYPERTENSION: Primary | ICD-10-CM

## 2023-11-16 RX ORDER — AMLODIPINE BESYLATE 10 MG/1
10 TABLET ORAL DAILY
Qty: 30 TABLET | Refills: 3 | Status: SHIPPED | OUTPATIENT
Start: 2023-11-16

## 2023-11-22 ENCOUNTER — TELEPHONE (OUTPATIENT)
Dept: PODIATRY | Facility: CLINIC | Age: 53
End: 2023-11-22
Payer: COMMERCIAL

## 2023-11-22 NOTE — TELEPHONE ENCOUNTER
"Caller: Itzel Hogan \"Erika\"    Relationship to patient: Self    Best call back number:     Chief complaint: RT FOOT    Type of visit: CORTISONE INJECTION    Requested date: ASAP - MORNING PREFERRED        "

## 2023-12-07 ENCOUNTER — OFFICE VISIT (OUTPATIENT)
Dept: PODIATRY | Facility: CLINIC | Age: 53
End: 2023-12-07
Payer: COMMERCIAL

## 2023-12-07 VITALS — RESPIRATION RATE: 16 BRPM | BODY MASS INDEX: 34.04 KG/M2 | HEIGHT: 62 IN | WEIGHT: 185 LBS

## 2023-12-07 DIAGNOSIS — M77.41 METATARSALGIA, RIGHT FOOT: ICD-10-CM

## 2023-12-07 DIAGNOSIS — M87.076 AVASCULAR NECROSIS OF BONE OF FOOT: ICD-10-CM

## 2023-12-07 DIAGNOSIS — M79.671 RIGHT FOOT PAIN: Primary | ICD-10-CM

## 2023-12-07 DIAGNOSIS — S99.921S PLANTAR PLATE INJURY, RIGHT, SEQUELA: ICD-10-CM

## 2023-12-07 RX ORDER — EPINEPHRINE 0.3 MG/.3ML
INJECTION SUBCUTANEOUS SEE ADMIN INSTRUCTIONS
COMMUNITY
Start: 2023-11-03

## 2023-12-07 RX ORDER — AMLODIPINE BESYLATE 5 MG/1
1 TABLET ORAL DAILY
COMMUNITY
Start: 2023-11-17

## 2023-12-07 NOTE — PROGRESS NOTES
12/07/2023  Foot and Ankle Surgery - Established Patient/Follow-up  Provider: Dr. Leon Corbin DPM  Location: [facility]    Subjective:  Itzel Hogan is a 52 y.o. female.     Chief Complaint   Patient presents with    Right Foot - Follow-up, Pain    Follow-up     VIMAL Rodas HAVEN 9/26/2023       HPI: The patient is a 52-year-old female who presents to the clinic for a follow-up regarding her right foot.    She was last seen in 08/2023. She reports her right foot is beginning to bother her again. She states she received an injection on 07/13/2023, which provided significant relief. She notes her pain is not as painful as it was prior to the injection. She reports approximately 2 to 3 weeks ago, she stepped down off of a ladder and experienced pain. The patient describes her pain as a burning sensation. She notes it feels as if something is stuck on the plantar aspect of her foot. She states she has been wearing the over-the-counter arch supports.    Additionally, the patient reports she was on a Medrol Dosepak several weeks ago secondary to hives. She states she was instructed not to take ibuprofen secondary to being on amlodipine for hypertension.    Allergies   Allergen Reactions    Penicillins Hives    Prochlorperazine Dizziness    Stadol [Butorphanol] Dizziness    Lisinopril Swelling    Cephalexin Hives       Current Outpatient Medications on File Prior to Visit   Medication Sig Dispense Refill    amLODIPine (NORVASC) 5 MG tablet Take 1 tablet by mouth Daily.      atorvastatin (LIPITOR) 10 MG tablet TAKE 1 TABLET DAILY 90 tablet 3    azelastine (ASTELIN) 0.1 % nasal spray 2 sprays into the nostril(s) as directed by provider 2 (Two) Times a Day As Needed for Allergies. 30 mL 3    EPINEPHrine (EPIPEN) 0.3 MG/0.3ML solution auto-injector injection See Admin Instructions. for allergic reaction      ferrous sulfate 325 (65 FE) MG tablet Take 1 tablet by mouth Daily With Breakfast. 30 tablet 3     "omeprazole (priLOSEC) 20 MG capsule TAKE 1 CAPSULE DAILY 90 capsule 3    Probiotic Product (Probiotic Advanced) capsule Take 1 capsule by mouth Daily. Specific for vaginal health      tretinoin (RETIN-A) 0.1 % cream APPLY PEA SIZED AMOUNT TOPICALLY TO FACE EVERY NIGHT      amLODIPine (NORVASC) 10 MG tablet Take 1 tablet by mouth Daily. (Patient not taking: Reported on 12/7/2023) 30 tablet 3    methylPREDNISolone (MEDROL) 4 MG dose pack Take as directed on package instructions. (Patient not taking: Reported on 12/7/2023) 21 tablet 0     No current facility-administered medications on file prior to visit.       Objective   Resp 16   Ht 157.5 cm (62\")   Wt 83.9 kg (185 lb)   LMP 07/06/2023 (Approximate)   BMI 33.84 kg/m²     Foot/Ankle Exam    GENERAL  Orientation:  AAOx3  Affect:  appropriate    VASCULAR     Right Foot Vascularity   Normal vascular exam    Dorsalis pedis:  2+  Posterior tibial:  2+  Skin temperature:  warm  Edema grading:  None  CFT:  < 3 seconds  Pedal hair growth:  Present  Varicosities:  none     Left Foot Vascularity   Normal vascular exam    Dorsalis pedis:  2+  Posterior tibial:  2+  Skin temperature:  warm  Edema grading:  None  CFT:  < 3 seconds  Pedal hair growth:  Present  Varicosities:  none     NEUROLOGIC     Right Foot Neurologic   Light touch sensation: normal  Hot/Cold sensation: normal  Achilles reflex:  2+     Left Foot Neurologic   Light touch sensation: normal  Hot/Cold sensation:  normal  Achilles reflex:  2+    MUSCULOSKELETAL     Right Foot Musculoskeletal   Arch:  Normal     Left Foot Musculoskeletal   Arch:  Normal    MUSCLE STRENGTH     Right Foot Muscle Strength   Normal strength    Foot dorsiflexion:  5  Foot plantar flexion:  5  Foot inversion:  5  Foot eversion:  5     Left Foot Muscle Strength   Normal strength    Foot dorsiflexion:  5  Foot plantar flexion:  5  Foot inversion:  5  Foot eversion:  5    DERMATOLOGIC      Right Foot Dermatologic   Skin  Right foot skin " is intact.      Left Foot Dermatologic   Skin  Left foot skin is intact.     TESTS     Right Foot Tests   Anterior drawer: negative  Varus tilt: negative     Left Foot Tests   Anterior drawer: negative  Varus tilt: negative     Right foot additional comments: 04/27/2023: Moderate equinus contracture with knee extended and flexed. Discomfort with palpation involving the plantar aspect of the second metatarsophalangeal joint region. Mild hammer digital deformity and mild bunion deformity with dorsal medial bony prominence.    06/01/2023  Continued discomfort involving the 2nd metatarsophalangeal joint of the right foot. Calf discomfort and significant equinus contracture with knee extended and flexed.    07/13/2023  Significant discomfort with palpation involving the 2nd metatarsophalangeal joint region. Mild progressive hammertoe deformity involving the right 2nd digit.    08/10/2023  Less discomfort with palpation involving the second metatarsophalangeal joint region.    12/07/2023  Continued discomfort with palpation involving the plantar aspect of the second metatarsophalangeal joint region. No significant swelling or inflammation.      Assessment & Plan   Diagnoses and all orders for this visit:    1. Right foot pain (Primary)    2. Metatarsalgia, right foot    3. Plantar plate injury, right, sequela    4. Avascular necrosis of bone of foot      Patient presents to the office today for a follow-up regarding her right foot. Discussed the etiology and biomechanics involved with her right foot pain. Recommend patient wear a CAM boot. Recommend patient obtain a new pair of inserts. She may take ibuprofen as needed for pain. We discussed avoidance of ambulating while barefoot. The patient will return to the office in 2 to 3 weeks for reevaluation.     Greater than 20 minutes was spent before, during, and after evaluation for patient care.    No orders of the defined types were placed in this encounter.         Note is  dictated utilizing voice recognition software. Unfortunately this leads to occasional typographical errors. I apologize in advance if the situation occurs. If questions occur please do not hesitate to call our office.    Transcribed from ambient dictation for HERMES Corbin DPM by Costa Orantes.  12/07/23   09:59 EST    Patient or patient representative verbalized consent to the visit recording.  I have personally performed the services described in this document as transcribed by the above individual, and it is both accurate and complete.

## 2023-12-26 ENCOUNTER — APPOINTMENT (OUTPATIENT)
Dept: CT IMAGING | Facility: HOSPITAL | Age: 53
End: 2023-12-26
Payer: COMMERCIAL

## 2023-12-26 ENCOUNTER — HOSPITAL ENCOUNTER (EMERGENCY)
Facility: HOSPITAL | Age: 53
Discharge: HOME | End: 2023-12-26
Attending: EMERGENCY MEDICINE | Admitting: EMERGENCY MEDICINE
Payer: COMMERCIAL

## 2023-12-26 VITALS
HEART RATE: 90 BPM | OXYGEN SATURATION: 97 % | RESPIRATION RATE: 20 BRPM | SYSTOLIC BLOOD PRESSURE: 139 MMHG | BODY MASS INDEX: 35.5 KG/M2 | HEIGHT: 62 IN | DIASTOLIC BLOOD PRESSURE: 98 MMHG | WEIGHT: 192.9 LBS | TEMPERATURE: 97.9 F

## 2023-12-26 DIAGNOSIS — I15.9 SECONDARY HYPERTENSION: Primary | ICD-10-CM

## 2023-12-26 DIAGNOSIS — L50.9 HIVES: ICD-10-CM

## 2023-12-26 DIAGNOSIS — R51.9 NONINTRACTABLE HEADACHE, UNSPECIFIED CHRONICITY PATTERN, UNSPECIFIED HEADACHE TYPE: ICD-10-CM

## 2023-12-26 LAB
ALBUMIN SERPL-MCNC: 4.5 G/DL (ref 3.5–5.2)
ALBUMIN/GLOB SERPL: 1.5 G/DL
ALP SERPL-CCNC: 117 U/L (ref 39–117)
ALT SERPL W P-5'-P-CCNC: 31 U/L (ref 1–33)
ANION GAP SERPL CALCULATED.3IONS-SCNC: 12 MMOL/L (ref 5–15)
AST SERPL-CCNC: 23 U/L (ref 1–32)
BASOPHILS # BLD AUTO: 0 10*3/MM3 (ref 0–0.2)
BASOPHILS NFR BLD AUTO: 0.6 % (ref 0–1.5)
BILIRUB SERPL-MCNC: 0.2 MG/DL (ref 0–1.2)
BUN SERPL-MCNC: 12 MG/DL (ref 6–20)
BUN/CREAT SERPL: 17.4 (ref 7–25)
CALCIUM SPEC-SCNC: 9.7 MG/DL (ref 8.6–10.5)
CHLORIDE SERPL-SCNC: 102 MMOL/L (ref 98–107)
CO2 SERPL-SCNC: 28 MMOL/L (ref 22–29)
CREAT SERPL-MCNC: 0.69 MG/DL (ref 0.57–1)
DEPRECATED RDW RBC AUTO: 51.6 FL (ref 37–54)
EGFRCR SERPLBLD CKD-EPI 2021: 104.6 ML/MIN/1.73
EOSINOPHIL # BLD AUTO: 0.2 10*3/MM3 (ref 0–0.4)
EOSINOPHIL NFR BLD AUTO: 2.5 % (ref 0.3–6.2)
ERYTHROCYTE [DISTWIDTH] IN BLOOD BY AUTOMATED COUNT: 16.9 % (ref 12.3–15.4)
FLUAV SUBTYP SPEC NAA+PROBE: NOT DETECTED
FLUBV RNA ISLT QL NAA+PROBE: NOT DETECTED
GLOBULIN UR ELPH-MCNC: 3 GM/DL
GLUCOSE SERPL-MCNC: 144 MG/DL (ref 65–99)
HCT VFR BLD AUTO: 39.7 % (ref 34–46.6)
HGB BLD-MCNC: 13.3 G/DL (ref 12–15.9)
LYMPHOCYTES # BLD AUTO: 1.9 10*3/MM3 (ref 0.7–3.1)
LYMPHOCYTES NFR BLD AUTO: 27.4 % (ref 19.6–45.3)
MCH RBC QN AUTO: 27.7 PG (ref 26.6–33)
MCHC RBC AUTO-ENTMCNC: 33.5 G/DL (ref 31.5–35.7)
MCV RBC AUTO: 82.6 FL (ref 79–97)
MONOCYTES # BLD AUTO: 0.5 10*3/MM3 (ref 0.1–0.9)
MONOCYTES NFR BLD AUTO: 7.7 % (ref 5–12)
NEUTROPHILS NFR BLD AUTO: 4.3 10*3/MM3 (ref 1.7–7)
NEUTROPHILS NFR BLD AUTO: 61.8 % (ref 42.7–76)
NRBC BLD AUTO-RTO: 0 /100 WBC (ref 0–0.2)
PLATELET # BLD AUTO: 270 10*3/MM3 (ref 140–450)
PMV BLD AUTO: 8.6 FL (ref 6–12)
POTASSIUM SERPL-SCNC: 3.7 MMOL/L (ref 3.5–5.2)
PROT SERPL-MCNC: 7.5 G/DL (ref 6–8.5)
RBC # BLD AUTO: 4.8 10*6/MM3 (ref 3.77–5.28)
SARS-COV-2 RNA RESP QL NAA+PROBE: NOT DETECTED
SODIUM SERPL-SCNC: 142 MMOL/L (ref 136–145)
WBC NRBC COR # BLD AUTO: 6.9 10*3/MM3 (ref 3.4–10.8)

## 2023-12-26 PROCEDURE — 99284 EMERGENCY DEPT VISIT MOD MDM: CPT

## 2023-12-26 PROCEDURE — 70450 CT HEAD/BRAIN W/O DYE: CPT

## 2023-12-26 PROCEDURE — 85025 COMPLETE CBC W/AUTO DIFF WBC: CPT | Performed by: NURSE PRACTITIONER

## 2023-12-26 PROCEDURE — 80053 COMPREHEN METABOLIC PANEL: CPT | Performed by: NURSE PRACTITIONER

## 2023-12-26 PROCEDURE — 87636 SARSCOV2 & INF A&B AMP PRB: CPT | Performed by: NURSE PRACTITIONER

## 2023-12-26 RX ORDER — PREDNISONE 20 MG/1
20 TABLET ORAL DAILY
Qty: 5 TABLET | Refills: 0 | Status: SHIPPED | OUTPATIENT
Start: 2023-12-26 | End: 2023-12-28

## 2023-12-26 RX ORDER — ACETAMINOPHEN AND CODEINE PHOSPHATE 300; 30 MG/1; MG/1
1 TABLET ORAL EVERY 4 HOURS PRN
Qty: 12 TABLET | Refills: 0 | Status: SHIPPED | OUTPATIENT
Start: 2023-12-26

## 2023-12-26 RX ORDER — SODIUM CHLORIDE 0.9 % (FLUSH) 0.9 %
10 SYRINGE (ML) INJECTION AS NEEDED
Status: DISCONTINUED | OUTPATIENT
Start: 2023-12-26 | End: 2023-12-27 | Stop reason: HOSPADM

## 2023-12-26 RX ORDER — HYDROCODONE BITARTRATE AND ACETAMINOPHEN 7.5; 325 MG/1; MG/1
1 TABLET ORAL ONCE
Status: COMPLETED | OUTPATIENT
Start: 2023-12-26 | End: 2023-12-26

## 2023-12-26 RX ADMIN — HYDROCODONE BITARTRATE AND ACETAMINOPHEN 1 TABLET: 7.5; 325 TABLET ORAL at 23:13

## 2023-12-27 ENCOUNTER — CLINICAL SUPPORT (OUTPATIENT)
Dept: FAMILY MEDICINE CLINIC | Facility: CLINIC | Age: 53
End: 2023-12-27
Payer: COMMERCIAL

## 2023-12-27 VITALS — DIASTOLIC BLOOD PRESSURE: 101 MMHG | OXYGEN SATURATION: 100 % | SYSTOLIC BLOOD PRESSURE: 149 MMHG | HEART RATE: 98 BPM

## 2023-12-27 DIAGNOSIS — I10 HYPERTENSION, UNSPECIFIED TYPE: Primary | ICD-10-CM

## 2023-12-27 RX ORDER — HYDROCHLOROTHIAZIDE 25 MG/1
25 TABLET ORAL DAILY
Qty: 30 TABLET | Refills: 0 | Status: SHIPPED | OUTPATIENT
Start: 2023-12-27

## 2023-12-27 NOTE — PROGRESS NOTES
Pts BP is elevated today. She still has a HA. Was recently on lisinopril and this was stopped due to angioedema. She went to ER yesterday with MANZO. They thought that is why her BP was elevated and gave her a pain pill. She is taking norvasc 10mg daily. I will add in Hctz 25mg daily. Will see her for an office visit tomorrow.

## 2023-12-27 NOTE — ED PROVIDER NOTES
Subjective     Provider in Triage Note  Patient is a 53 year old female who comes in for headache and high BP after taking 2 allegra at 0230 today per the Allergist request.   Takes Norvasc 10 daily for BP    Due to significant overcrowding in the emergency department patient was initially seen and evaluated in triage.  Provider in triage recommended patient placement in the treatment area to initiate therapy and movement to an ER bed as soon as possible.    History of Present Illness  I interviewed the patient HPI and agree with the nurse practitioner providing triage note as noted above  Review of Systems    Past Medical History:   Diagnosis Date    Allergic     Allergies     Anemia     B12 deficiency     Bunion     Difficulty walking     Diverticulosis April 1 , 2023    Fallen arches     GERD (gastroesophageal reflux disease)     Hammer toe     Hyperlipidemia     Hypertension     Migraine        Allergies   Allergen Reactions    Penicillins Hives    Prochlorperazine Dizziness    Stadol [Butorphanol] Dizziness    Lisinopril Swelling    Cephalexin Hives       Past Surgical History:   Procedure Laterality Date    CARPAL TUNNEL RELEASE Bilateral     COLONOSCOPY      ENDOSCOPY      SINUS SURGERY      TONSILLECTOMY      WISDOM TOOTH EXTRACTION         Family History   Problem Relation Age of Onset    Stroke Mother     Heart failure Mother     Cancer Mother     Asthma Mother     Hyperlipidemia Mother     Hypertension Mother     Skin cancer Father     Anemia Father     Asthma Sister     Heart attack Maternal Grandmother     Heart attack Maternal Grandfather        Social History     Socioeconomic History    Marital status:    Tobacco Use    Smoking status: Never    Smokeless tobacco: Never   Vaping Use    Vaping Use: Never used   Substance and Sexual Activity    Alcohol use: Not Currently     Comment: occasionally    Drug use: Never    Sexual activity: Yes     Partners: Male     Birth control/protection: Condom            Objective   Physical Exam  Neurologic exam is nonfocal.  Neck has no adenopathy JVD or bruits.  Lungs clear.  Heart has rate rhythm.  Abdomen soft.  Back has mild diffuse low back pain on palpation.  Procedures           ED Course      CT Head Without Contrast    Result Date: 12/26/2023  Impression: No acute intracranial abnormality. Electronically Signed: Ronn Raines MD  12/26/2023 8:59 PM EST  Workstation ID: [id number]   Results for orders placed or performed during the hospital encounter of 12/26/23   COVID-19 and FLU A/B PCR, 1 HR TAT - Swab, Nasopharynx    Specimen: Nasopharynx; Swab   Result Value Ref Range    COVID19 Not Detected Not Detected - Ref. Range    Influenza A PCR Not Detected Not Detected    Influenza B PCR Not Detected Not Detected   Comprehensive Metabolic Panel    Specimen: Blood   Result Value Ref Range    Glucose 144 (H) 65 - 99 mg/dL    BUN 12 6 - 20 mg/dL    Creatinine 0.69 0.57 - 1.00 mg/dL    Sodium 142 136 - 145 mmol/L    Potassium 3.7 3.5 - 5.2 mmol/L    Chloride 102 98 - 107 mmol/L    CO2 28.0 22.0 - 29.0 mmol/L    Calcium 9.7 8.6 - 10.5 mg/dL    Total Protein 7.5 6.0 - 8.5 g/dL    Albumin 4.5 3.5 - 5.2 g/dL    ALT (SGPT) 31 1 - 33 U/L    AST (SGOT) 23 1 - 32 U/L    Alkaline Phosphatase 117 39 - 117 U/L    Total Bilirubin 0.2 0.0 - 1.2 mg/dL    Globulin 3.0 gm/dL    A/G Ratio 1.5 g/dL    BUN/Creatinine Ratio 17.4 7.0 - 25.0    Anion Gap 12.0 5.0 - 15.0 mmol/L    eGFR 104.6 >60.0 mL/min/1.73   CBC Auto Differential    Specimen: Blood   Result Value Ref Range    WBC 6.90 3.40 - 10.80 10*3/mm3    RBC 4.80 3.77 - 5.28 10*6/mm3    Hemoglobin 13.3 12.0 - 15.9 g/dL    Hematocrit 39.7 34.0 - 46.6 %    MCV 82.6 79.0 - 97.0 fL    MCH 27.7 26.6 - 33.0 pg    MCHC 33.5 31.5 - 35.7 g/dL    RDW 16.9 (H) 12.3 - 15.4 %    RDW-SD 51.6 37.0 - 54.0 fl    MPV 8.6 6.0 - 12.0 fL    Platelets 270 140 - 450 10*3/mm3    Neutrophil % 61.8 42.7 - 76.0 %    Lymphocyte % 27.4 19.6 - 45.3 %    Monocyte %  7.7 5.0 - 12.0 %    Eosinophil % 2.5 0.3 - 6.2 %    Basophil % 0.6 0.0 - 1.5 %    Neutrophils, Absolute 4.30 1.70 - 7.00 10*3/mm3    Lymphocytes, Absolute 1.90 0.70 - 3.10 10*3/mm3    Monocytes, Absolute 0.50 0.10 - 0.90 10*3/mm3    Eosinophils, Absolute 0.20 0.00 - 0.40 10*3/mm3    Basophils, Absolute 0.00 0.00 - 0.20 10*3/mm3    nRBC 0.0 0.0 - 0.2 /100 WBC                                            Medical Decision Making  My interpretation patient CT scan of head without contrast shows no atrial hemorrhage mass effect or other abnormality.  COVID and influenza are negative.  Patient base medical panel is at baseline without renal insufficiency or electrolyte abnormality.  Patient has no leukocytosis and no left shift.  Current blood pressure is 130/80.  Patient will be discharged with a prescription for prednisone and Tylenol 3.  She will see MD for recheck as needed.    Amount and/or Complexity of Data Reviewed  Labs: ordered. Decision-making details documented in ED Course.  Radiology: ordered and independent interpretation performed.    Risk  Prescription drug management.        Final diagnoses:   Secondary hypertension   Nonintractable headache, unspecified chronicity pattern, unspecified headache type   Hives       ED Disposition  ED Disposition       ED Disposition   Discharge    Condition   Stable    Comment   --               No follow-up provider specified.       Medication List        New Prescriptions      acetaminophen-codeine 300-30 MG per tablet  Commonly known as: TYLENOL with CODEINE #3  Take 1 tablet by mouth Every 4 (Four) Hours As Needed for Moderate Pain.     predniSONE 20 MG tablet  Commonly known as: DELTASONE  Take 1 tablet by mouth Daily.               Where to Get Your Medications        These medications were sent to [id number] - [place] - [address] [facility] [address] - [phone]  - [phone]  [address]  [place] [address]      Phone: [phone]   acetaminophen-codeine 300-30 MG per tablet  predniSONE 20 MG tablet            Hardik Pitts MD  12/26/23 [id number]

## 2023-12-27 NOTE — PROGRESS NOTES
"Subjective     Itzel Hogan is a 53 y.o. female.     History of Present Illness  Patient is here today with complaints of headache and hypertension.  She is a patient of Keily Rodas.  Patient was seen in the ER a few days ago due to a headache.  They believed her blood pressure was elevated due to the headache and gave her a pain pill.  She came yesterday for a BP check and it was elevated.   She was started on Hctz 25mg daily.  She takes Norvasc 10 mg daily.  This was just increased from 5mg a few days ago.  Patient was previously on lisinopril but they were concerned that it was causing angioedema due to hives and some facial swelling.  She has been seeing an allergist recently.  She is getting hives intermittently.  She has a history of migraines but this doesn't feel like her norm  She has a lot of pressure in her maxillary sinus area and it wraps around the back of her head.  She has a history of sinus surgery.         The following portions of the patient's history were reviewed and updated as appropriate: allergies, current medications, past family history, past medical history, past social history, past surgical history, and problem list.    Review of Systems   Constitutional:  Negative for chills, fatigue and fever.   HENT:  Positive for sinus pressure.    Respiratory:  Negative for chest tightness and shortness of breath.    Cardiovascular:  Negative for chest pain and palpitations.   Skin:         hives   Neurological:  Positive for headache. Negative for dizziness.   Psychiatric/Behavioral:  The patient is nervous/anxious.        Objective     /89 (BP Location: Left arm, Patient Position: Sitting, Cuff Size: Adult)   Pulse 107   Temp 98 °F (36.7 °C) (Oral)   Resp 18   Ht 157.5 cm (62.01\")   Wt 82.7 kg (182 lb 6.4 oz)   SpO2 99%   BMI 33.35 kg/m²     Current Outpatient Medications on File Prior to Visit   Medication Sig Dispense Refill    acetaminophen-codeine (TYLENOL with CODEINE #3) " 300-30 MG per tablet Take 1 tablet by mouth Every 4 (Four) Hours As Needed for Moderate Pain. 12 tablet 0    amLODIPine (NORVASC) 10 MG tablet Take 1 tablet by mouth Daily. 30 tablet 3    atorvastatin (LIPITOR) 10 MG tablet TAKE 1 TABLET DAILY 90 tablet 3    azelastine (ASTELIN) 0.1 % nasal spray 2 sprays into the nostril(s) as directed by provider 2 (Two) Times a Day As Needed for Allergies. 30 mL 3    EPINEPHrine (EPIPEN) 0.3 MG/0.3ML solution auto-injector injection See Admin Instructions. for allergic reaction      ferrous sulfate 325 (65 FE) MG tablet Take 1 tablet by mouth Daily With Breakfast. 30 tablet 3    hydroCHLOROthiazide (HYDRODIURIL) 25 MG tablet Take 1 tablet by mouth Daily. 30 tablet 0    omeprazole (priLOSEC) 20 MG capsule TAKE 1 CAPSULE DAILY 90 capsule 3    Probiotic Product (Probiotic Advanced) capsule Take 1 capsule by mouth Daily. Specific for vaginal health      tretinoin (RETIN-A) 0.1 % cream APPLY PEA SIZED AMOUNT TOPICALLY TO FACE EVERY NIGHT      [DISCONTINUED] amLODIPine (NORVASC) 5 MG tablet Take 1 tablet by mouth Daily. (Patient not taking: Reported on 12/28/2023)      [DISCONTINUED] methylPREDNISolone (MEDROL) 4 MG dose pack Take as directed on package instructions. (Patient not taking: Reported on 12/28/2023) 21 tablet 0    [DISCONTINUED] predniSONE (DELTASONE) 20 MG tablet Take 1 tablet by mouth Daily. 5 tablet 0     No current facility-administered medications on file prior to visit.                 Physical Exam  Constitutional:       General: She is not in acute distress.     Appearance: Normal appearance. She is not ill-appearing.   HENT:      Head: Normocephalic and atraumatic.   Eyes:      Extraocular Movements: Extraocular movements intact.   Cardiovascular:      Rate and Rhythm: Normal rate and regular rhythm.      Heart sounds: No murmur heard.  Pulmonary:      Effort: Pulmonary effort is normal. No respiratory distress.   Neurological:      General: No focal deficit  present.      Mental Status: She is alert and oriented to person, place, and time.   Psychiatric:         Mood and Affect: Mood normal.         Behavior: Behavior normal.         Thought Content: Thought content normal.         Judgment: Judgment normal.           Assessment & Plan     Diagnoses and all orders for this visit:    1. Essential hypertension (Primary)  Comments:  improved  cont norvasc 10mg daily and Hctz 25mg daily  monitor BP at home    2. Acute nonintractable headache, unspecified headache type  Comments:  possibly related to sinuses  start flonase  complete steroid that ER gave  message update

## 2023-12-28 ENCOUNTER — OFFICE VISIT (OUTPATIENT)
Dept: FAMILY MEDICINE CLINIC | Facility: CLINIC | Age: 53
End: 2023-12-28
Payer: COMMERCIAL

## 2023-12-28 VITALS
TEMPERATURE: 98 F | HEIGHT: 62 IN | WEIGHT: 182.4 LBS | RESPIRATION RATE: 18 BRPM | OXYGEN SATURATION: 99 % | BODY MASS INDEX: 33.57 KG/M2 | DIASTOLIC BLOOD PRESSURE: 89 MMHG | SYSTOLIC BLOOD PRESSURE: 126 MMHG | HEART RATE: 107 BPM

## 2023-12-28 DIAGNOSIS — R51.9 ACUTE NONINTRACTABLE HEADACHE, UNSPECIFIED HEADACHE TYPE: ICD-10-CM

## 2023-12-28 DIAGNOSIS — I10 ESSENTIAL HYPERTENSION: Primary | ICD-10-CM

## 2023-12-28 PROCEDURE — 99214 OFFICE O/P EST MOD 30 MIN: CPT | Performed by: NURSE PRACTITIONER

## 2023-12-28 NOTE — PATIENT INSTRUCTIONS
Start flonase nasal spray  Cont Hctz and norvasc  Complete steroids that ER gave  Monitor BP at home  Push water intake

## 2023-12-29 ENCOUNTER — HOSPITAL ENCOUNTER (EMERGENCY)
Facility: HOSPITAL | Age: 53
Discharge: HOME | End: 2023-12-30
Attending: EMERGENCY MEDICINE
Payer: COMMERCIAL

## 2023-12-29 VITALS
BODY MASS INDEX: 34.61 KG/M2 | DIASTOLIC BLOOD PRESSURE: 101 MMHG | SYSTOLIC BLOOD PRESSURE: 149 MMHG | TEMPERATURE: 98.1 F | OXYGEN SATURATION: 98 % | RESPIRATION RATE: 16 BRPM | HEART RATE: 111 BPM | WEIGHT: 188.05 LBS | HEIGHT: 62 IN

## 2023-12-29 DIAGNOSIS — R22.0 LIP SWELLING: Primary | ICD-10-CM

## 2023-12-29 DIAGNOSIS — L50.9 HIVES: ICD-10-CM

## 2023-12-29 DIAGNOSIS — T50.905A ADVERSE EFFECT OF DRUG, INITIAL ENCOUNTER: ICD-10-CM

## 2023-12-29 PROCEDURE — 99283 EMERGENCY DEPT VISIT LOW MDM: CPT

## 2023-12-29 PROCEDURE — 63710000001 DIPHENHYDRAMINE PER 50 MG

## 2023-12-29 RX ORDER — FUROSEMIDE 40 MG/1
40 TABLET ORAL ONCE
Status: COMPLETED | OUTPATIENT
Start: 2023-12-29 | End: 2023-12-29

## 2023-12-29 RX ORDER — FUROSEMIDE 40 MG/1
40 TABLET ORAL DAILY
Status: DISCONTINUED | OUTPATIENT
Start: 2023-12-30 | End: 2023-12-29

## 2023-12-29 RX ORDER — DIPHENHYDRAMINE HCL 25 MG
50 CAPSULE ORAL ONCE
Status: COMPLETED | OUTPATIENT
Start: 2023-12-29 | End: 2023-12-29

## 2023-12-29 RX ADMIN — DIPHENHYDRAMINE HYDROCHLORIDE 50 MG: 25 CAPSULE ORAL at 22:32

## 2023-12-29 RX ADMIN — FUROSEMIDE 40 MG: 40 TABLET ORAL at 22:32

## 2023-12-30 PROCEDURE — 96372 THER/PROPH/DIAG INJ SC/IM: CPT

## 2023-12-30 PROCEDURE — 25010000002 METHYLPREDNISOLONE PER 125 MG

## 2023-12-30 RX ORDER — METHYLPREDNISOLONE SODIUM SUCCINATE 125 MG/2ML
80 INJECTION, POWDER, LYOPHILIZED, FOR SOLUTION INTRAMUSCULAR; INTRAVENOUS ONCE
Status: COMPLETED | OUTPATIENT
Start: 2023-12-30 | End: 2023-12-30

## 2023-12-30 RX ADMIN — METHYLPREDNISOLONE SODIUM SUCCINATE 80 MG: 125 INJECTION, POWDER, FOR SOLUTION INTRAMUSCULAR; INTRAVENOUS at 00:26

## 2023-12-30 NOTE — ED NOTES
Pt reports upon arrival she had swelling to lip and hives on arms and shoulders. When brought back pt reports swelling and hives have resolved.

## 2023-12-30 NOTE — DISCHARGE INSTRUCTIONS
Continue taking previously prescribed medications.  Consider taking Benadryl with these medications of concern for reaction is present.  Monitor your blood pressure as directed by your primary care provider.    Follow-up with primary care provider for further evaluation and management as needed.    Return to the ER for new or worsening symptoms.

## 2023-12-30 NOTE — ED PROVIDER NOTES
Subjective   History of Present Illness  Patient is a pleasant 53-year-old  female with a history of allergies, hypertension and hyperlipidemia who presents to to the emergency room with large of intermittent hives that have been ongoing since October and lip swelling that started tonight.  Patient was recently started on HCTZ for treatment of her high blood pressure in conjunction with Norvasc that she has been taking for several months.  She began having symptoms of reaction to lisinopril prior to being switched to these medications and was given a Medrol Dosepak, which she has been taking as directed.  Tonight, she had acute onset swelling of her lips and was concerned for reaction.  She has had no difficulty swallowing or oral swelling.  Patient states that her hives have been intermittent since October but have been worse recently as well.  She does see an allergist and has not had her allergy injections in several weeks but is scheduled to have them restarted soon.  She denies chest pain, dizziness, shortness of breath and chest pain.      Review of Systems   Constitutional:  Negative for appetite change and fever.   HENT:  Positive for facial swelling. Negative for drooling, rhinorrhea and trouble swallowing.    Respiratory:  Negative for shortness of breath.    Cardiovascular:  Negative for chest pain.   Gastrointestinal:  Negative for abdominal pain.   Genitourinary:  Negative for dysuria.   Neurological:  Negative for syncope and headaches.   Psychiatric/Behavioral:  Negative for confusion. The patient is not nervous/anxious.        Past Medical History:   Diagnosis Date    Allergic     Allergies     Anemia     B12 deficiency     Bunion     Difficulty walking     Diverticulosis April 1 , 2023    Fallen arches     GERD (gastroesophageal reflux disease)     Hammer toe     Hyperlipidemia     Hypertension     Migraine        Allergies   Allergen Reactions    Penicillins Hives    Prochlorperazine  "Dizziness    Stadol [Butorphanol] Dizziness    Lisinopril Swelling    Cephalexin Hives       Past Surgical History:   Procedure Laterality Date    CARPAL TUNNEL RELEASE Bilateral     COLONOSCOPY      ENDOSCOPY      SINUS SURGERY      TONSILLECTOMY      WISDOM TOOTH EXTRACTION         Family History   Problem Relation Age of Onset    Stroke Mother     Heart failure Mother     Cancer Mother     Asthma Mother     Hyperlipidemia Mother     Hypertension Mother     Skin cancer Father     Anemia Father     Asthma Sister     Heart attack Maternal Grandmother     Heart attack Maternal Grandfather        Social History     Socioeconomic History    Marital status:    Tobacco Use    Smoking status: Never    Smokeless tobacco: Never   Vaping Use    Vaping Use: Never used   Substance and Sexual Activity    Alcohol use: Not Currently     Comment: occasionally    Drug use: Never    Sexual activity: Yes     Partners: Male     Birth control/protection: Condom           Objective   Physical Exam  Vitals and nursing note reviewed.   Constitutional:       General: She is not in acute distress.     Appearance: Normal appearance. She is obese. She is not ill-appearing.   HENT:      Head: Normocephalic and atraumatic.      Comments: Mild amount of lip swelling noted to patient's bottom lip.  Her airway is grossly patent..  Cardiovascular:      Rate and Rhythm: Normal rate and regular rhythm.      Heart sounds: Normal heart sounds. No murmur heard.  Pulmonary:      Effort: Pulmonary effort is normal.      Breath sounds: Normal breath sounds. No wheezing.   Abdominal:      Tenderness: There is no abdominal tenderness.   Skin:     General: Skin is warm and dry.   Neurological:      Mental Status: She is alert.         Procedures           ED Course      BP (!) 149/101 (BP Location: Left arm, Patient Position: Sitting)   Pulse 111   Temp 98.1 °F (36.7 °C) (Oral)   Resp 16   Ht 157.5 cm (62\")   Wt 85.3 kg (188 lb 0.8 oz)   SpO2 98% "   BMI 34.40 kg/m²   Labs Reviewed - No data to display  Medications   diphenhydrAMINE (BENADRYL) capsule 50 mg (50 mg Oral Given 12/29/23 2232)   furosemide (LASIX) tablet 40 mg (40 mg Oral Given 12/29/23 2232)   methylPREDNISolone sodium succinate (SOLU-Medrol) injection 80 mg (80 mg Intramuscular Given 12/30/23 0026)     No radiology results for the last day                                         Medical Decision Making  Risk  Prescription drug management.    Patient is a 53-year-old obese  female with a history of allergies and hypertension who presents the emergency room with complaints of lip swelling that started acutely today after taking HCTZ.  She has had intermittent hives on her shoulders and arms as well but states these have resolved upon exam.  Normal S1-S2 without clicks or murmurs.  No JVD.  Lungs are clear to auscultation in all fields.  Abdomen is soft and nontender with normal bowel sounds throughout.  Patient has very mild evidence of hives on shoulders bilaterally and is repetitively itching at them.  She has mild swelling to her lips, primarily on the bottom, with no evidence of trauma.  Her airway is grossly intact and she has no other evidence of allergic reaction at this time.  Lungs are clear to auscultation in all fields.  Initial differentials include allergic reaction, autoimmune disorder, anxiety.  This is not a complete list.    Due to overwhelming hospital census and boarding inpatients in the emergency room, patient received above examination in hallway bed.  Examination was made to the best of provider's ability with respect to patient privacy and confidentiality.  Patient received Benadryl and Lasix.  After monitoring her for about an hour, patient complained of returning rash.  Given that she was pretreated with Benadryl, I am  not convinced that this rash is due to allergic reaction.  She was given a dose of Solu-Medrol, which improved her symptoms.  Upon reassessment,  patient denies new evidence of rash and continues to deny shortness of breath.  At this time, I believe patient is stable for discharge.  This was discussed with patient and she is agreeable.  Patient was advised to continue taking her HCTZ as directed by her primary care provider but was informed to take it with Benadryl if concern for reaction.  She will follow-up to her primary care provider and OB/GYN for further evaluation and management as scheduled.  Further, I encouraged her to consider seeing her allergist.  She verbalized understanding and is agreeable to plan of care.  Patient is able to ambulate upright steadily without assistance upon discharge.  No acute distress noted.    I discussed the findings with patient who voices understanding of discharge instructions, signs and symptoms requiring return to the ED; discharged improved and stable condition with follow-up for reevaluation.    Patient is aware that discharge does not mean that nothing is wrong but it indicates no emergency is present and they must continue care with follow-up as given below or physician of their choice.    This document is intended for medical expert use only.  Reading of this document by patients and/or patient's family without participating medical staff guidance may result in misinterpretation and unintended morbidity.  Any interpretation of such data is the responsibility of the patient and/or family member responsible for the patient in concert with their primary or specialist providers, not to be left for sources of online search as such as [employer], [employer] or similar queries.  Relying on these approaches to knowledge may result in misinterpretation, misguided goals of care and even death should patient or family members try recommendations outside of the realm of professional medical care in a supervised inpatient environment.    This medical document was created using Dragon dictation system. Some errors in speech  recognition may occur.    Final diagnoses:   Hives   Lip swelling   Adverse effect of drug, initial encounter       ED Disposition  ED Disposition       ED Disposition   Discharge    Condition   Stable    Comment   --               Keily Rodas, APRN  [address] [place] [facility]  [phone]               Medication List      No changes were made to your prescriptions during this visit.            Deborah Marroquin, APRN  12/30/23 0122

## 2024-01-02 ENCOUNTER — TELEPHONE (OUTPATIENT)
Dept: CARDIOLOGY | Facility: CLINIC | Age: 54
End: 2024-01-02
Payer: COMMERCIAL

## 2024-01-02 NOTE — TELEPHONE ENCOUNTER
"  Caller: Itzel Hogan \"Erika\"    Relationship to patient: Self    Best call back number: [phone]    Chief complaint: HIGH BP     Type of visit: FOLLOW UP     Requested date: ASAP      Additional notes: PT STATES SHE HAS BEEN DEALING WITH HIGH BP, HER PCP REFERRED FOR HER TO SEE CARDIOLOGY. LAST VISIT WAS 1.27.22. PLEASE ADVISE WHEN TO GET PT IN AND CALL BACK TO SCHEDULE. PT IS ALSO NEEDING A SLEEP STUDY AS WELL.        "

## 2024-01-03 ENCOUNTER — OFFICE VISIT (OUTPATIENT)
Dept: CARDIOLOGY | Facility: CLINIC | Age: 54
End: 2024-01-03
Payer: COMMERCIAL

## 2024-01-03 VITALS
OXYGEN SATURATION: 100 % | SYSTOLIC BLOOD PRESSURE: 131 MMHG | WEIGHT: 184 LBS | BODY MASS INDEX: 33.86 KG/M2 | HEART RATE: 98 BPM | HEIGHT: 62 IN | DIASTOLIC BLOOD PRESSURE: 90 MMHG

## 2024-01-03 DIAGNOSIS — R06.02 SOB (SHORTNESS OF BREATH): Primary | ICD-10-CM

## 2024-01-03 DIAGNOSIS — I10 ESSENTIAL HYPERTENSION: ICD-10-CM

## 2024-01-03 DIAGNOSIS — E78.5 DYSLIPIDEMIA: ICD-10-CM

## 2024-01-03 DIAGNOSIS — R00.2 PALPITATIONS: ICD-10-CM

## 2024-01-03 PROCEDURE — 93000 ELECTROCARDIOGRAM COMPLETE: CPT | Performed by: INTERNAL MEDICINE

## 2024-01-03 PROCEDURE — 99214 OFFICE O/P EST MOD 30 MIN: CPT | Performed by: INTERNAL MEDICINE

## 2024-01-03 NOTE — PROGRESS NOTES
Encounter Date:01/03/2024  Last seen 1/27/2022      Patient ID: Itzel Hogan is a 53 y.o. female.      Chief Complaint:     Palpitations  Hypertension  Dyslipidemia  Shortness of breath        History of Present Illness  Recently patient was having hives and was on steroid for short-term therapy.  Subsequently patient was having problems with high blood pressure and increased heart rate.  Norvasc was increased to 10 mg a day and hydrochlorothiazide was added.    Since I have last seen, the patient has been without any chest discomfort ,shortness of breath, palpitations dizziness or syncope.  Denies having any headache ,abdominal pain ,nausea, vomiting , diarrhea constipation, loss of weight or loss of appetite.  Denies having any excessive bruising ,hematuria or blood in the stool.    Review of all systems negative except as indicated.    Reviewed ROS.    Assessment and Plan      ]]]]]]]]]]]]]]]]]  History  ========  -Palpitations-better     -Exertional shortness of breath-better     Echocardiogram-normal 2/11/2020  Stress Cardiolite test-normal 2/11/2020     -Hypertension dyslipidemia migraine headaches recent cholesterol 233  (March 2020)     -Status post tonsillectomy sinus surgery carpal tunnel release     -Family history of coronary artery disease.     -Non-smoker     -Allergic to penicillin cephalexin prochlorperazine  ===========  Plan  ========  Recently patient was having hives and was on steroid for short-term therapy.  Subsequently patient was having problems with high blood pressure and increased heart rate.  Norvasc was increased to 10 mg a day and hydrochlorothiazide was added.    EKG showed sinus rhythm without any ischemic changes-1/3/2024    Hypertension-131/90.  Hold off on hydrochlorothiazide.  Started metoprolol XL 25 mg a day.  Reduce Norvasc to 5 mg a day from 10 mg if she has lower blood pressure readings.    Patient is having intermittent headaches probably due to elevated blood  pressure.    Patient is off steroids now which were given for hives.     Dyslipidemia-diet controlled     Medications were reviewed and updated.    Patient is not having any angina pectoris or congestive heart failure     Follow-up in the office in 6 weeks.     Further plan will depend on patient's progress.    Reviewed and updated-1/3/2024  [[[[[[[[[[[[[[[[[             Diagnosis Plan   1. SOB (shortness of breath)        2. Dyslipidemia        3. Essential hypertension        4. Palpitations        LAB RESULTS (LAST 7 DAYS)    CBC        BMP        CMP         BNP        TROPONIN        CoAg        Creatinine Clearance  Estimated Creatinine Clearance: 95.6 mL/min (by C-G formula based on SCr of 0.69 mg/dL).    ABG        Radiology  No radiology results for the last day                The following portions of the patient's history were reviewed and updated as appropriate: allergies, current medications, past family history, past medical history, past social history, past surgical history, and problem list.    Review of Systems   Constitutional: Negative for malaise/fatigue.   Cardiovascular:  Negative for chest pain, dyspnea on exertion, leg swelling and palpitations.   Respiratory:  Negative for cough and shortness of breath.    Gastrointestinal:  Negative for abdominal pain, nausea and vomiting.   Neurological:  Positive for headaches. Negative for dizziness, focal weakness, light-headedness and numbness.   All other systems reviewed and are negative.        Current Outpatient Medications:     acetaminophen-codeine (TYLENOL with CODEINE #3) 300-30 MG per tablet, Take 1 tablet by mouth Every 4 (Four) Hours As Needed for Moderate Pain., Disp: 12 tablet, Rfl: 0    amLODIPine (NORVASC) 10 MG tablet, Take 1 tablet by mouth Daily., Disp: 30 tablet, Rfl: 3    atorvastatin (LIPITOR) 10 MG tablet, TAKE 1 TABLET DAILY, Disp: 90 tablet, Rfl: 3    azelastine (ASTELIN) 0.1 % nasal spray, 2 sprays into the nostril(s) as  directed by provider 2 (Two) Times a Day As Needed for Allergies., Disp: 30 mL, Rfl: 3    EPINEPHrine (EPIPEN) 0.3 MG/0.3ML solution auto-injector injection, See Admin Instructions. for allergic reaction, Disp: , Rfl:     ferrous sulfate 325 (65 FE) MG tablet, Take 1 tablet by mouth Daily With Breakfast., Disp: 30 tablet, Rfl: 3    hydroCHLOROthiazide (HYDRODIURIL) 25 MG tablet, Take 1 tablet by mouth Daily., Disp: 30 tablet, Rfl: 0    omeprazole (priLOSEC) 20 MG capsule, TAKE 1 CAPSULE DAILY, Disp: 90 capsule, Rfl: 3    Probiotic Product (Probiotic Advanced) capsule, Take 1 capsule by mouth Daily. Specific for vaginal health, Disp: , Rfl:     tretinoin (RETIN-A) 0.1 % cream, APPLY PEA SIZED AMOUNT TOPICALLY TO FACE EVERY NIGHT, Disp: , Rfl:     Allergies   Allergen Reactions    Penicillins Hives    Prochlorperazine Dizziness    Stadol [Butorphanol] Dizziness    Lisinopril Swelling    Cephalexin Hives       Family History   Problem Relation Age of Onset    Stroke Mother     Heart failure Mother     Cancer Mother     Asthma Mother     Hyperlipidemia Mother     Hypertension Mother     Skin cancer Father     Anemia Father     Asthma Sister     Heart attack Maternal Grandmother     Heart attack Maternal Grandfather        Past Surgical History:   Procedure Laterality Date    CARPAL TUNNEL RELEASE Bilateral     COLONOSCOPY      ENDOSCOPY      SINUS SURGERY      TONSILLECTOMY      WISDOM TOOTH EXTRACTION         Past Medical History:   Diagnosis Date    Allergic     Allergies     Anemia     B12 deficiency     Bunion     Difficulty walking     Diverticulosis April 1 , 2023    Fallen archaamir     GERD (gastroesophageal reflux disease)     Hammer toe     Hyperlipidemia     Hypertension     Migraine        Family History   Problem Relation Age of Onset    Stroke Mother     Heart failure Mother     Cancer Mother     Asthma Mother     Hyperlipidemia Mother     Hypertension Mother     Skin cancer Father     Anemia Father      "Asthma Sister     Heart attack Maternal Grandmother     Heart attack Maternal Grandfather        Social History     Socioeconomic History    Marital status:    Tobacco Use    Smoking status: Never     Passive exposure: Never    Smokeless tobacco: Never   Vaping Use    Vaping Use: Never used   Substance and Sexual Activity    Alcohol use: Not Currently     Comment: occasionally    Drug use: Never    Sexual activity: Yes     Partners: Male     Birth control/protection: Condom           ECG 12 Lead    Date/Time: 1/3/2024 1:32 PM  Performed by: Oleg Flores MD    Authorized by: Oleg Flores MD  Comparison: compared with previous ECG   Similar to previous ECG  Comparison to previous ECG: Normal sinus rhythm nonspecific ST-T wave changes 90/min normal axis normal intervals no ectopy no significant change from previous EKG.            Objective:       Physical Exam    /90 (BP Location: Left arm, Patient Position: Lying, Cuff Size: Adult)   Pulse 98   Ht 157.5 cm (62\")   Wt 83.5 kg (184 lb)   SpO2 100%   BMI 33.65 kg/m²   The patient is alert, oriented and in no distress.    Vital signs as noted above.    Head and neck revealed no carotid bruits or jugular venous distension.  No thyromegaly or lymphadenopathy is present.    Lungs clear.  No wheezing.  Breath sounds are normal bilaterally.    Heart normal first and second heart sounds.  No murmur..  No pericardial rub is present.  No gallop is present.    Abdomen soft and nontender.  No organomegaly is present.    Extremities revealed good peripheral pulses without any pedal edema.    Skin warm and dry.    Musculoskeletal system is grossly normal.    CNS grossly normal.    Reviewed and updated.    Reviewed and updated.        "

## 2024-01-10 DIAGNOSIS — K57.92 DIVERTICULITIS: Primary | ICD-10-CM

## 2024-01-10 RX ORDER — CIPROFLOXACIN 500 MG/1
500 TABLET, FILM COATED ORAL 2 TIMES DAILY
Qty: 14 TABLET | Refills: 0 | Status: SHIPPED | OUTPATIENT
Start: 2024-01-10 | End: 2024-01-17

## 2024-01-12 ENCOUNTER — OFFICE VISIT (OUTPATIENT)
Dept: FAMILY MEDICINE CLINIC | Facility: CLINIC | Age: 54
End: 2024-01-12
Payer: COMMERCIAL

## 2024-01-12 VITALS
WEIGHT: 190 LBS | HEIGHT: 62 IN | HEART RATE: 84 BPM | DIASTOLIC BLOOD PRESSURE: 90 MMHG | OXYGEN SATURATION: 97 % | SYSTOLIC BLOOD PRESSURE: 128 MMHG | BODY MASS INDEX: 34.96 KG/M2

## 2024-01-12 DIAGNOSIS — I10 ESSENTIAL HYPERTENSION: Primary | ICD-10-CM

## 2024-01-12 PROCEDURE — 99213 OFFICE O/P EST LOW 20 MIN: CPT | Performed by: NURSE PRACTITIONER

## 2024-01-12 RX ORDER — AMLODIPINE BESYLATE 5 MG/1
TABLET ORAL
COMMUNITY
Start: 2024-01-09

## 2024-01-12 RX ORDER — METOPROLOL SUCCINATE 25 MG/1
25 TABLET, EXTENDED RELEASE ORAL EVERY MORNING
COMMUNITY
Start: 2024-01-04

## 2024-01-12 NOTE — PROGRESS NOTES
Subjective   Itzel Hogan is a 53 y.o. female.       HPI   Pt is here today for follow up on HTN. Recently had med adjusted by cardiology.    Currently on amlodipine 5 mg daily; metoprolol XL 25 mg daily.   BP has improved.  HR ranging 's.    Denies any CP; palpitations; SOA; dizziness; headache; trouble with vision.       The following portions of the patient's history were reviewed and updated as appropriate: allergies, current medications, past family history, past medical history, past social history, past surgical history, and problem list.    Review of Systems   Constitutional:  Negative for chills, fatigue and fever.   Eyes:  Negative for visual disturbance.   Respiratory:  Negative for cough and shortness of breath.    Cardiovascular:  Negative for chest pain, palpitations and leg swelling.   Gastrointestinal:  Negative for diarrhea, nausea and vomiting.   Neurological:  Negative for dizziness, weakness and headache.   Psychiatric/Behavioral:  Negative for depressed mood. The patient is not nervous/anxious.        Objective   Physical Exam  Vitals reviewed.   Constitutional:       General: She is not in acute distress.     Appearance: Normal appearance. She is obese.   Cardiovascular:      Rate and Rhythm: Normal rate and regular rhythm.      Pulses: Normal pulses.      Heart sounds: Normal heart sounds. No murmur heard.  Pulmonary:      Effort: Pulmonary effort is normal. No respiratory distress.      Breath sounds: Normal breath sounds. No wheezing or rhonchi.   Chest:      Chest wall: No tenderness.   Abdominal:      Tenderness: There is no right CVA tenderness or left CVA tenderness.   Skin:     General: Skin is warm and dry.      Findings: No erythema.   Neurological:      General: No focal deficit present.      Mental Status: She is alert and oriented to person, place, and time.   Psychiatric:         Mood and Affect: Mood normal.                  Procedures   Assessment & Plan   Diagnoses and  all orders for this visit:    1. Essential hypertension (Primary)  Comments:  Stable.   Cont. current medication and home monitoring.   RTO in 6mo.      Work on healthy diet; aim for 150 min exercise weekly.

## 2024-01-16 DIAGNOSIS — K21.9 GASTROESOPHAGEAL REFLUX DISEASE, UNSPECIFIED WHETHER ESOPHAGITIS PRESENT: Primary | ICD-10-CM

## 2024-01-16 DIAGNOSIS — E88.09 ALPHA GALACTOSIDASE DEFICIENCY: Primary | ICD-10-CM

## 2024-01-16 RX ORDER — OMEPRAZOLE 20 MG/1
20 TABLET, DELAYED RELEASE ORAL DAILY
Qty: 90 TABLET | Refills: 1 | Status: SHIPPED | OUTPATIENT
Start: 2024-01-16 | End: 2024-01-18 | Stop reason: SDUPTHER

## 2024-01-18 DIAGNOSIS — K21.9 GASTROESOPHAGEAL REFLUX DISEASE, UNSPECIFIED WHETHER ESOPHAGITIS PRESENT: ICD-10-CM

## 2024-01-18 RX ORDER — OMEPRAZOLE 20 MG/1
20 TABLET, DELAYED RELEASE ORAL DAILY
Qty: 90 TABLET | Refills: 1 | Status: SHIPPED | OUTPATIENT
Start: 2024-01-18

## 2024-01-24 ENCOUNTER — TELEPHONE (OUTPATIENT)
Dept: PODIATRY | Facility: CLINIC | Age: 54
End: 2024-01-24

## 2024-01-24 NOTE — TELEPHONE ENCOUNTER
Caller: PATIENT    Relationship to patient: SELF     Best call back number: [phone]    Chief complaint: SECOND TOE PAIN. RIGHT FOOT    Type of visit: FOLLOW UP    Requested date: ASAP - PT STATED SHE NEEDS AN APPT BEFORE 2:00 PM    Additional notes: PATIENT WAS CALLING TO SCHEDULE AN APPT WITH DR. CABALLERO FOR THE PAIN IN HER SECOND TOE ON THE RIGHT FOOT. I WENT AHEAD AND SCHEDULED HER FOR DR. CABALLERO NEXT AVAILABLE APPT ON 02.26.24 BUT PATIENT WOULD LIKE TO GET IN SOONER IF POSSIBLE. PATIENT STATED SHE NEEDS AN APPT BEFORE 2:00 PM THOUGH. THANK YOU!

## 2024-02-05 ENCOUNTER — OFFICE (AMBULATORY)
Dept: URBAN - METROPOLITAN AREA CLINIC 64 | Facility: CLINIC | Age: 54
End: 2024-02-05

## 2024-02-05 VITALS
DIASTOLIC BLOOD PRESSURE: 86 MMHG | HEART RATE: 84 BPM | WEIGHT: 194 LBS | SYSTOLIC BLOOD PRESSURE: 122 MMHG | HEIGHT: 62 IN

## 2024-02-05 DIAGNOSIS — K21.9 GASTRO-ESOPHAGEAL REFLUX DISEASE WITHOUT ESOPHAGITIS: ICD-10-CM

## 2024-02-05 DIAGNOSIS — K57.90 DIVERTICULOSIS OF INTESTINE, PART UNSPECIFIED, WITHOUT PERFO: ICD-10-CM

## 2024-02-05 PROCEDURE — 99214 OFFICE O/P EST MOD 30 MIN: CPT

## 2024-02-08 RX ORDER — AMLODIPINE BESYLATE 5 MG/1
5 TABLET ORAL DAILY
Qty: 90 TABLET | Refills: 1 | Status: SHIPPED | OUTPATIENT
Start: 2024-02-08

## 2024-02-12 ENCOUNTER — OFFICE VISIT (OUTPATIENT)
Dept: PODIATRY | Facility: CLINIC | Age: 54
End: 2024-02-12
Payer: COMMERCIAL

## 2024-02-12 VITALS — HEIGHT: 62 IN | RESPIRATION RATE: 20 BRPM | WEIGHT: 190 LBS | BODY MASS INDEX: 34.96 KG/M2

## 2024-02-12 DIAGNOSIS — M79.671 RIGHT FOOT PAIN: Primary | ICD-10-CM

## 2024-02-12 DIAGNOSIS — M21.611 BUNION, RIGHT: ICD-10-CM

## 2024-02-12 DIAGNOSIS — S99.921S PLANTAR PLATE INJURY, RIGHT, SEQUELA: ICD-10-CM

## 2024-02-12 DIAGNOSIS — M77.41 METATARSALGIA, RIGHT FOOT: ICD-10-CM

## 2024-02-12 DIAGNOSIS — M20.41 HAMMER TOE OF RIGHT FOOT: ICD-10-CM

## 2024-02-12 RX ORDER — FAMOTIDINE 10 MG
TABLET ORAL
COMMUNITY

## 2024-02-12 RX ORDER — CETIRIZINE HYDROCHLORIDE 10 MG/1
TABLET ORAL
COMMUNITY

## 2024-02-12 RX ORDER — DOXYCYCLINE 100 MG/1
20 TABLET ORAL
COMMUNITY
Start: 2023-09-19

## 2024-02-12 RX ORDER — NICOTINE POLACRILEX 4 MG/1
1 GUM, CHEWING ORAL DAILY
COMMUNITY
Start: 2024-01-19

## 2024-02-12 RX ORDER — FLUTICASONE PROPIONATE 50 MCG
SPRAY, SUSPENSION (ML) NASAL
COMMUNITY

## 2024-02-13 PROBLEM — M77.41 METATARSALGIA, RIGHT FOOT: Status: ACTIVE | Noted: 2024-02-12

## 2024-02-13 PROBLEM — M20.41 HAMMER TOE OF RIGHT FOOT: Status: ACTIVE | Noted: 2024-02-12

## 2024-02-13 PROBLEM — M21.611 BUNION, RIGHT: Status: ACTIVE | Noted: 2024-02-12

## 2024-02-13 PROBLEM — S99.921S: Status: ACTIVE | Noted: 2024-02-12

## 2024-02-14 ENCOUNTER — TELEPHONE (OUTPATIENT)
Dept: CARDIOLOGY | Facility: CLINIC | Age: 54
End: 2024-02-14
Payer: COMMERCIAL

## 2024-02-15 ENCOUNTER — OFFICE VISIT (OUTPATIENT)
Dept: CARDIOLOGY | Facility: CLINIC | Age: 54
End: 2024-02-15
Payer: COMMERCIAL

## 2024-02-15 VITALS
OXYGEN SATURATION: 98 % | HEIGHT: 62 IN | BODY MASS INDEX: 35.7 KG/M2 | HEART RATE: 82 BPM | DIASTOLIC BLOOD PRESSURE: 90 MMHG | SYSTOLIC BLOOD PRESSURE: 130 MMHG | WEIGHT: 194 LBS

## 2024-02-15 DIAGNOSIS — E78.5 DYSLIPIDEMIA: ICD-10-CM

## 2024-02-15 DIAGNOSIS — R06.02 SOB (SHORTNESS OF BREATH): Primary | ICD-10-CM

## 2024-02-15 DIAGNOSIS — R00.2 PALPITATIONS: ICD-10-CM

## 2024-02-15 DIAGNOSIS — I10 ESSENTIAL HYPERTENSION: ICD-10-CM

## 2024-02-15 PROCEDURE — 99214 OFFICE O/P EST MOD 30 MIN: CPT | Performed by: INTERNAL MEDICINE

## 2024-02-26 DIAGNOSIS — B00.1 COLD SORE: Primary | ICD-10-CM

## 2024-02-26 RX ORDER — VALACYCLOVIR HYDROCHLORIDE 1 G/1
1000 TABLET, FILM COATED ORAL 2 TIMES DAILY
COMMUNITY
End: 2024-02-26 | Stop reason: SDUPTHER

## 2024-02-26 RX ORDER — VALACYCLOVIR HYDROCHLORIDE 1 G/1
TABLET, FILM COATED ORAL
Qty: 30 TABLET | Refills: 0 | Status: SHIPPED | OUTPATIENT
Start: 2024-02-26

## 2024-03-21 DIAGNOSIS — K57.92 DIVERTICULITIS: Primary | ICD-10-CM

## 2024-03-21 RX ORDER — METRONIDAZOLE 500 MG/1
500 TABLET ORAL 2 TIMES DAILY
Qty: 14 TABLET | Refills: 0 | Status: SHIPPED | OUTPATIENT
Start: 2024-03-21 | End: 2024-03-28

## 2024-03-21 RX ORDER — CIPROFLOXACIN 500 MG/1
500 TABLET, FILM COATED ORAL 2 TIMES DAILY
Qty: 14 TABLET | Refills: 0 | Status: SHIPPED | OUTPATIENT
Start: 2024-03-21 | End: 2024-03-28

## 2024-03-25 ENCOUNTER — LAB (OUTPATIENT)
Dept: LAB | Facility: HOSPITAL | Age: 54
End: 2024-03-25
Payer: COMMERCIAL

## 2024-03-25 ENCOUNTER — HOSPITAL ENCOUNTER (OUTPATIENT)
Dept: CARDIOLOGY | Facility: HOSPITAL | Age: 54
Discharge: HOME | End: 2024-03-25
Payer: COMMERCIAL

## 2024-03-25 ENCOUNTER — HOSPITAL ENCOUNTER (OUTPATIENT)
Dept: GENERAL RADIOLOGY | Facility: HOSPITAL | Age: 54
Discharge: HOME | End: 2024-03-25
Payer: COMMERCIAL

## 2024-03-25 DIAGNOSIS — S99.921S PLANTAR PLATE INJURY, RIGHT, SEQUELA: ICD-10-CM

## 2024-03-25 DIAGNOSIS — M77.41 METATARSALGIA, RIGHT FOOT: ICD-10-CM

## 2024-03-25 DIAGNOSIS — M20.41 HAMMER TOE OF RIGHT FOOT: ICD-10-CM

## 2024-03-25 DIAGNOSIS — M21.611 BUNION, RIGHT: ICD-10-CM

## 2024-03-25 LAB
ANION GAP SERPL CALCULATED.3IONS-SCNC: 9 MMOL/L (ref 5–15)
BUN SERPL-MCNC: 12 MG/DL (ref 6–20)
BUN/CREAT SERPL: 14.3 (ref 7–25)
CALCIUM SPEC-SCNC: 9.4 MG/DL (ref 8.6–10.5)
CHLORIDE SERPL-SCNC: 104 MMOL/L (ref 98–107)
CO2 SERPL-SCNC: 28 MMOL/L (ref 22–29)
CREAT SERPL-MCNC: 0.84 MG/DL (ref 0.57–1)
DEPRECATED RDW RBC AUTO: 41.7 FL (ref 37–54)
EGFRCR SERPLBLD CKD-EPI 2021: 83.7 ML/MIN/1.73
ERYTHROCYTE [DISTWIDTH] IN BLOOD BY AUTOMATED COUNT: 13.3 % (ref 12.3–15.4)
GLUCOSE SERPL-MCNC: 102 MG/DL (ref 65–99)
HCT VFR BLD AUTO: 40 % (ref 34–46.6)
HGB BLD-MCNC: 13.3 G/DL (ref 12–15.9)
MCH RBC QN AUTO: 28.2 PG (ref 26.6–33)
MCHC RBC AUTO-ENTMCNC: 33.3 G/DL (ref 31.5–35.7)
MCV RBC AUTO: 84.7 FL (ref 79–97)
PLATELET # BLD AUTO: 250 10*3/MM3 (ref 140–450)
PMV BLD AUTO: 11.6 FL (ref 6–12)
POTASSIUM SERPL-SCNC: 4.1 MMOL/L (ref 3.5–5.2)
QT INTERVAL: 385 MS
QTC INTERVAL: 425 MS
RBC # BLD AUTO: 4.72 10*6/MM3 (ref 3.77–5.28)
SODIUM SERPL-SCNC: 141 MMOL/L (ref 136–145)
WBC NRBC COR # BLD AUTO: 5.85 10*3/MM3 (ref 3.4–10.8)

## 2024-03-25 PROCEDURE — 93005 ELECTROCARDIOGRAM TRACING: CPT | Performed by: PODIATRIST

## 2024-03-25 PROCEDURE — 93010 ELECTROCARDIOGRAM REPORT: CPT | Performed by: INTERNAL MEDICINE

## 2024-03-25 PROCEDURE — 36415 COLL VENOUS BLD VENIPUNCTURE: CPT

## 2024-03-25 PROCEDURE — 71046 X-RAY EXAM CHEST 2 VIEWS: CPT

## 2024-03-25 PROCEDURE — 80048 BASIC METABOLIC PNL TOTAL CA: CPT

## 2024-03-25 PROCEDURE — 85027 COMPLETE CBC AUTOMATED: CPT

## 2024-03-25 NOTE — PROGRESS NOTES
Subjective   Itzel Hogan is a 53 y.o. female.       HPI   Pt is here today for follow up.   1)  HTN - Currently on amlodipine 5 mg daily; metoprolol XL 25 mg daily. BP stable. Denies any CP; palpitations; SOA; dizziness; headache; trouble with vision.   2) Hyperlipidemia - currently on atorvastatin 10 mg daily.    3) GERD - currently on omeprazole 20 mg daily.   4) Spot on right outer thigh that feels numb/tingly for past 3 weeks; no swelling, redness or warmth.  Had issue when pregnant but improved after delivery.  Has lumbar DDD.    5) FYI- seeing Rheumatology today for first visit.        The following portions of the patient's history were reviewed and updated as appropriate: allergies, current medications, past family history, past medical history, past social history, past surgical history, and problem list.    Review of Systems   Constitutional:  Negative for chills, fatigue and fever.   Respiratory:  Negative for cough, shortness of breath and wheezing.    Cardiovascular:  Negative for chest pain and palpitations.   Gastrointestinal:  Negative for diarrhea, nausea and vomiting.   Genitourinary:  Negative for dysuria, frequency, hematuria and urgency.   Musculoskeletal:  Positive for arthralgias.   Neurological:  Positive for numbness. Negative for dizziness, weakness, light-headedness and headache.   Psychiatric/Behavioral:  Negative for depressed mood. The patient is not nervous/anxious.        Objective   Physical Exam  Vitals reviewed.   Constitutional:       General: She is not in acute distress.     Appearance: Normal appearance. She is obese.   Cardiovascular:      Rate and Rhythm: Normal rate and regular rhythm.      Pulses: Normal pulses.      Heart sounds: Normal heart sounds. No murmur heard.  Pulmonary:      Effort: Pulmonary effort is normal. No respiratory distress.      Breath sounds: Normal breath sounds. No wheezing or rhonchi.   Chest:      Chest wall: No tenderness.   Abdominal:       Tenderness: There is no right CVA tenderness or left CVA tenderness.   Musculoskeletal:      Lumbar back: No swelling, deformity or tenderness. Normal range of motion.   Skin:     General: Skin is warm and dry.      Findings: No erythema.   Neurological:      General: No focal deficit present.      Mental Status: She is alert and oriented to person, place, and time.   Psychiatric:         Mood and Affect: Mood normal.                  Procedures   Assessment & Plan   Diagnoses and all orders for this visit:    1. Essential hypertension (Primary)  Comments:  Stable.   Cont. current medication.   Recent labs reviewed.   RTO in 6 mo.    2. Mixed hyperlipidemia  Comments:  Stable.   Cont. current medication.   Recent labs reviewed.   RTO in 6 mo.    3. Gastroesophageal reflux disease, unspecified whether esophagitis present  Comments:  Stable.   Cont. current medication.   Recent labs reviewed.   RTO in 6 mo.    4. DDD (degenerative disc disease), lumbar  Comments:  Xray ordered of lumbar spine  Orders:  -     XR Spine Lumbar 2 or 3 View; Future    5. Numbness of right anterior thigh  Comments:  Xray ordered of lumbar spine.   Consider EMG  Orders:  -     XR Spine Lumbar 2 or 3 View; Future

## 2024-03-26 ENCOUNTER — OFFICE VISIT (OUTPATIENT)
Dept: FAMILY MEDICINE CLINIC | Facility: CLINIC | Age: 54
End: 2024-03-26
Payer: COMMERCIAL

## 2024-03-26 VITALS
HEIGHT: 62 IN | HEART RATE: 69 BPM | SYSTOLIC BLOOD PRESSURE: 117 MMHG | DIASTOLIC BLOOD PRESSURE: 78 MMHG | BODY MASS INDEX: 35.88 KG/M2 | WEIGHT: 195 LBS | OXYGEN SATURATION: 98 %

## 2024-03-26 DIAGNOSIS — K21.9 GASTROESOPHAGEAL REFLUX DISEASE, UNSPECIFIED WHETHER ESOPHAGITIS PRESENT: ICD-10-CM

## 2024-03-26 DIAGNOSIS — M51.36 DDD (DEGENERATIVE DISC DISEASE), LUMBAR: ICD-10-CM

## 2024-03-26 DIAGNOSIS — I10 ESSENTIAL HYPERTENSION: Primary | ICD-10-CM

## 2024-03-26 DIAGNOSIS — E78.2 MIXED HYPERLIPIDEMIA: ICD-10-CM

## 2024-03-26 DIAGNOSIS — R20.0 NUMBNESS OF RIGHT ANTERIOR THIGH: ICD-10-CM

## 2024-03-26 PROCEDURE — 99214 OFFICE O/P EST MOD 30 MIN: CPT | Performed by: NURSE PRACTITIONER

## 2024-03-27 DIAGNOSIS — R20.0 NUMBNESS AND TINGLING OF RIGHT LEG: Primary | ICD-10-CM

## 2024-03-27 DIAGNOSIS — R20.2 NUMBNESS AND TINGLING OF RIGHT LEG: Primary | ICD-10-CM

## 2024-04-05 ENCOUNTER — ANESTHESIA EVENT (OUTPATIENT)
Dept: PERIOP | Facility: HOSPITAL | Age: 54
End: 2024-04-05
Payer: COMMERCIAL

## 2024-04-05 ENCOUNTER — APPOINTMENT (OUTPATIENT)
Dept: GENERAL RADIOLOGY | Facility: HOSPITAL | Age: 54
End: 2024-04-05
Payer: COMMERCIAL

## 2024-04-05 ENCOUNTER — ANESTHESIA (OUTPATIENT)
Dept: PERIOP | Facility: HOSPITAL | Age: 54
End: 2024-04-05
Payer: COMMERCIAL

## 2024-04-05 ENCOUNTER — HOSPITAL ENCOUNTER (OUTPATIENT)
Facility: HOSPITAL | Age: 54
Setting detail: HOSPITAL OUTPATIENT SURGERY
Discharge: HOME | End: 2024-04-05
Attending: PODIATRIST | Admitting: PODIATRIST
Payer: COMMERCIAL

## 2024-04-05 VITALS
OXYGEN SATURATION: 98 % | WEIGHT: 193 LBS | DIASTOLIC BLOOD PRESSURE: 80 MMHG | TEMPERATURE: 98.4 F | HEIGHT: 62 IN | SYSTOLIC BLOOD PRESSURE: 122 MMHG | HEART RATE: 85 BPM | BODY MASS INDEX: 35.51 KG/M2 | RESPIRATION RATE: 15 BRPM

## 2024-04-05 DIAGNOSIS — M21.611 BUNION, RIGHT: ICD-10-CM

## 2024-04-05 DIAGNOSIS — M20.41 HAMMER TOE OF RIGHT FOOT: ICD-10-CM

## 2024-04-05 DIAGNOSIS — M77.41 METATARSALGIA, RIGHT FOOT: ICD-10-CM

## 2024-04-05 DIAGNOSIS — S99.921S PLANTAR PLATE INJURY, RIGHT, SEQUELA: ICD-10-CM

## 2024-04-05 LAB — B-HCG UR QL: NEGATIVE

## 2024-04-05 PROCEDURE — C1713 ANCHOR/SCREW BN/BN,TIS/BN: HCPCS | Performed by: PODIATRIST

## 2024-04-05 PROCEDURE — 25010000002 FENTANYL CITRATE (PF) 50 MCG/ML SOLUTION

## 2024-04-05 PROCEDURE — 25010000002 SUGAMMADEX 200 MG/2ML SOLUTION

## 2024-04-05 PROCEDURE — 28200 REPAIR OF FOOT TENDON: CPT | Performed by: PODIATRIST

## 2024-04-05 PROCEDURE — 81025 URINE PREGNANCY TEST: CPT | Performed by: PODIATRIST

## 2024-04-05 PROCEDURE — 76000 FLUOROSCOPY <1 HR PHYS/QHP: CPT

## 2024-04-05 PROCEDURE — C1776 JOINT DEVICE (IMPLANTABLE): HCPCS | Performed by: PODIATRIST

## 2024-04-05 PROCEDURE — 25010000002 FENTANYL CITRATE (PF) 100 MCG/2ML SOLUTION

## 2024-04-05 PROCEDURE — 25010000002 CLINDAMYCIN PER 300 MG: Performed by: PODIATRIST

## 2024-04-05 PROCEDURE — 28308 INCISION OF METATARSAL: CPT | Performed by: PODIATRIST

## 2024-04-05 PROCEDURE — 25010000002 LIDOCAINE 1 % SOLUTION 20 ML VIAL: Performed by: PODIATRIST

## 2024-04-05 PROCEDURE — 25010000002 PROPOFOL 200 MG/20ML EMULSION

## 2024-04-05 PROCEDURE — 25010000002 KETOROLAC TROMETHAMINE PER 15 MG

## 2024-04-05 PROCEDURE — 28296 COR HLX VLGS DSTL MTAR OSTEO: CPT | Performed by: PODIATRIST

## 2024-04-05 PROCEDURE — 25810000003 LACTATED RINGERS PER 1000 ML: Performed by: PODIATRIST

## 2024-04-05 PROCEDURE — 73620 X-RAY EXAM OF FOOT: CPT

## 2024-04-05 PROCEDURE — 25010000002 BUPIVACAINE (PF) 0.5 % SOLUTION 10 ML VIAL: Performed by: PODIATRIST

## 2024-04-05 PROCEDURE — 25010000002 ONDANSETRON PER 1 MG

## 2024-04-05 PROCEDURE — 25010000002 MIDAZOLAM PER 1 MG

## 2024-04-05 PROCEDURE — 28285 REPAIR OF HAMMERTOE: CPT | Performed by: PODIATRIST

## 2024-04-05 PROCEDURE — S0260 H&P FOR SURGERY: HCPCS | Performed by: PODIATRIST

## 2024-04-05 PROCEDURE — 25010000002 DEXAMETHASONE PER 1 MG

## 2024-04-05 DEVICE — HEADED SCREW
Type: IMPLANTABLE DEVICE | Site: FOOT | Status: FUNCTIONAL
Brand: MINI

## 2024-04-05 DEVICE — IMPLANTABLE DEVICE
Type: IMPLANTABLE DEVICE | Site: FOOT | Status: FUNCTIONAL
Brand: PRO-TOE

## 2024-04-05 DEVICE — IMPLANTABLE DEVICE
Type: IMPLANTABLE DEVICE | Site: FOOT | Status: FUNCTIONAL
Brand: GRAVITY

## 2024-04-05 RX ORDER — IPRATROPIUM BROMIDE AND ALBUTEROL SULFATE 2.5; .5 MG/3ML; MG/3ML
3 SOLUTION RESPIRATORY (INHALATION) ONCE AS NEEDED
Status: COMPLETED | OUTPATIENT
Start: 2024-04-05 | End: 2024-04-05

## 2024-04-05 RX ORDER — CLINDAMYCIN PHOSPHATE 900 MG/50ML
900 INJECTION, SOLUTION INTRAVENOUS ONCE
Status: COMPLETED | OUTPATIENT
Start: 2024-04-05 | End: 2024-04-05

## 2024-04-05 RX ORDER — OXYCODONE HYDROCHLORIDE 5 MG/1
15 TABLET ORAL EVERY 4 HOURS PRN
Status: DISCONTINUED | OUTPATIENT
Start: 2024-04-05 | End: 2024-04-05

## 2024-04-05 RX ORDER — FLUMAZENIL 0.1 MG/ML
0.2 INJECTION INTRAVENOUS AS NEEDED
Status: DISCONTINUED | OUTPATIENT
Start: 2024-04-05 | End: 2024-04-05

## 2024-04-05 RX ORDER — ROCURONIUM BROMIDE 10 MG/ML
INJECTION, SOLUTION INTRAVENOUS AS NEEDED
Status: DISCONTINUED | OUTPATIENT
Start: 2024-04-05 | End: 2024-04-05 | Stop reason: SURG

## 2024-04-05 RX ORDER — ONDANSETRON 2 MG/ML
4 INJECTION INTRAMUSCULAR; INTRAVENOUS ONCE AS NEEDED
Status: DISCONTINUED | OUTPATIENT
Start: 2024-04-05 | End: 2024-04-05 | Stop reason: HOSPADM

## 2024-04-05 RX ORDER — SODIUM CHLORIDE 0.9 % (FLUSH) 0.9 %
10 SYRINGE (ML) INJECTION AS NEEDED
Status: DISCONTINUED | OUTPATIENT
Start: 2024-04-05 | End: 2024-04-05 | Stop reason: HOSPADM

## 2024-04-05 RX ORDER — DEXAMETHASONE SODIUM PHOSPHATE 4 MG/ML
INJECTION, SOLUTION INTRA-ARTICULAR; INTRALESIONAL; INTRAMUSCULAR; INTRAVENOUS; SOFT TISSUE AS NEEDED
Status: DISCONTINUED | OUTPATIENT
Start: 2024-04-05 | End: 2024-04-05 | Stop reason: SURG

## 2024-04-05 RX ORDER — LIDOCAINE HYDROCHLORIDE 10 MG/ML
0.5 INJECTION, SOLUTION INFILTRATION; PERINEURAL ONCE AS NEEDED
Status: DISCONTINUED | OUTPATIENT
Start: 2024-04-05 | End: 2024-04-05

## 2024-04-05 RX ORDER — LABETALOL HYDROCHLORIDE 5 MG/ML
5 INJECTION, SOLUTION INTRAVENOUS
Status: DISCONTINUED | OUTPATIENT
Start: 2024-04-05 | End: 2024-04-05 | Stop reason: HOSPADM

## 2024-04-05 RX ORDER — EPHEDRINE SULFATE 5 MG/ML
5 INJECTION INTRAVENOUS ONCE AS NEEDED
Status: DISCONTINUED | OUTPATIENT
Start: 2024-04-05 | End: 2024-04-05

## 2024-04-05 RX ORDER — IPRATROPIUM BROMIDE AND ALBUTEROL SULFATE 2.5; .5 MG/3ML; MG/3ML
3 SOLUTION RESPIRATORY (INHALATION) ONCE AS NEEDED
Status: DISCONTINUED | OUTPATIENT
Start: 2024-04-05 | End: 2024-04-05

## 2024-04-05 RX ORDER — OXYCODONE HYDROCHLORIDE 5 MG/1
10 TABLET ORAL EVERY 4 HOURS PRN
Status: DISCONTINUED | OUTPATIENT
Start: 2024-04-05 | End: 2024-04-05 | Stop reason: HOSPADM

## 2024-04-05 RX ORDER — DIPHENHYDRAMINE HYDROCHLORIDE 50 MG/ML
12.5 INJECTION INTRAMUSCULAR; INTRAVENOUS
Status: DISCONTINUED | OUTPATIENT
Start: 2024-04-05 | End: 2024-04-05 | Stop reason: HOSPADM

## 2024-04-05 RX ORDER — ONDANSETRON 2 MG/ML
4 INJECTION INTRAMUSCULAR; INTRAVENOUS ONCE AS NEEDED
Status: DISCONTINUED | OUTPATIENT
Start: 2024-04-05 | End: 2024-04-05

## 2024-04-05 RX ORDER — NALOXONE HCL 0.4 MG/ML
0.4 VIAL (ML) INJECTION AS NEEDED
Status: DISCONTINUED | OUTPATIENT
Start: 2024-04-05 | End: 2024-04-05

## 2024-04-05 RX ORDER — ONDANSETRON 2 MG/ML
INJECTION INTRAMUSCULAR; INTRAVENOUS AS NEEDED
Status: DISCONTINUED | OUTPATIENT
Start: 2024-04-05 | End: 2024-04-05 | Stop reason: SURG

## 2024-04-05 RX ORDER — MEPERIDINE HYDROCHLORIDE 25 MG/ML
12.5 INJECTION INTRAMUSCULAR; INTRAVENOUS; SUBCUTANEOUS
Status: DISCONTINUED | OUTPATIENT
Start: 2024-04-05 | End: 2024-04-05

## 2024-04-05 RX ORDER — MIDAZOLAM HYDROCHLORIDE 1 MG/ML
INJECTION INTRAMUSCULAR; INTRAVENOUS AS NEEDED
Status: DISCONTINUED | OUTPATIENT
Start: 2024-04-05 | End: 2024-04-05 | Stop reason: SURG

## 2024-04-05 RX ORDER — PROPOFOL 10 MG/ML
INJECTION, EMULSION INTRAVENOUS AS NEEDED
Status: DISCONTINUED | OUTPATIENT
Start: 2024-04-05 | End: 2024-04-05 | Stop reason: SURG

## 2024-04-05 RX ORDER — SODIUM CHLORIDE, SODIUM LACTATE, POTASSIUM CHLORIDE, CALCIUM CHLORIDE 600; 310; 30; 20 MG/100ML; MG/100ML; MG/100ML; MG/100ML
1000 INJECTION, SOLUTION INTRAVENOUS CONTINUOUS
Status: DISCONTINUED | OUTPATIENT
Start: 2024-04-05 | End: 2024-04-05 | Stop reason: HOSPADM

## 2024-04-05 RX ORDER — LABETALOL HYDROCHLORIDE 5 MG/ML
5 INJECTION, SOLUTION INTRAVENOUS
Status: DISCONTINUED | OUTPATIENT
Start: 2024-04-05 | End: 2024-04-05

## 2024-04-05 RX ORDER — FLUMAZENIL 0.1 MG/ML
0.2 INJECTION INTRAVENOUS AS NEEDED
Status: DISCONTINUED | OUTPATIENT
Start: 2024-04-05 | End: 2024-04-05 | Stop reason: HOSPADM

## 2024-04-05 RX ORDER — FENTANYL CITRATE 50 UG/ML
50 INJECTION, SOLUTION INTRAMUSCULAR; INTRAVENOUS
Status: DISCONTINUED | OUTPATIENT
Start: 2024-04-05 | End: 2024-04-05

## 2024-04-05 RX ORDER — DIPHENHYDRAMINE HYDROCHLORIDE 50 MG/ML
12.5 INJECTION INTRAMUSCULAR; INTRAVENOUS ONCE AS NEEDED
Status: DISCONTINUED | OUTPATIENT
Start: 2024-04-05 | End: 2024-04-05

## 2024-04-05 RX ORDER — MIDAZOLAM HYDROCHLORIDE 1 MG/ML
1 INJECTION INTRAMUSCULAR; INTRAVENOUS
Status: DISCONTINUED | OUTPATIENT
Start: 2024-04-05 | End: 2024-04-05

## 2024-04-05 RX ORDER — NALOXONE HCL 0.4 MG/ML
0.4 VIAL (ML) INJECTION AS NEEDED
Status: DISCONTINUED | OUTPATIENT
Start: 2024-04-05 | End: 2024-04-05 | Stop reason: HOSPADM

## 2024-04-05 RX ORDER — DIPHENHYDRAMINE HYDROCHLORIDE 50 MG/ML
12.5 INJECTION INTRAMUSCULAR; INTRAVENOUS
Status: DISCONTINUED | OUTPATIENT
Start: 2024-04-05 | End: 2024-04-05

## 2024-04-05 RX ORDER — PHENYLEPHRINE HCL IN 0.9% NACL 1 MG/10 ML
SYRINGE (ML) INTRAVENOUS AS NEEDED
Status: DISCONTINUED | OUTPATIENT
Start: 2024-04-05 | End: 2024-04-05 | Stop reason: SURG

## 2024-04-05 RX ORDER — LIDOCAINE HYDROCHLORIDE 20 MG/ML
INJECTION, SOLUTION EPIDURAL; INFILTRATION; INTRACAUDAL; PERINEURAL AS NEEDED
Status: DISCONTINUED | OUTPATIENT
Start: 2024-04-05 | End: 2024-04-05 | Stop reason: SURG

## 2024-04-05 RX ORDER — EPHEDRINE SULFATE 5 MG/ML
5 INJECTION INTRAVENOUS ONCE AS NEEDED
Status: DISCONTINUED | OUTPATIENT
Start: 2024-04-05 | End: 2024-04-05 | Stop reason: HOSPADM

## 2024-04-05 RX ORDER — KETOROLAC TROMETHAMINE 30 MG/ML
INJECTION, SOLUTION INTRAMUSCULAR; INTRAVENOUS AS NEEDED
Status: DISCONTINUED | OUTPATIENT
Start: 2024-04-05 | End: 2024-04-05 | Stop reason: SURG

## 2024-04-05 RX ORDER — FENTANYL CITRATE 50 UG/ML
50 INJECTION, SOLUTION INTRAMUSCULAR; INTRAVENOUS
Status: DISCONTINUED | OUTPATIENT
Start: 2024-04-05 | End: 2024-04-05 | Stop reason: HOSPADM

## 2024-04-05 RX ORDER — HYDROCODONE BITARTRATE AND ACETAMINOPHEN 7.5; 325 MG/1; MG/1
1 TABLET ORAL EVERY 6 HOURS PRN
Qty: 28 TABLET | Refills: 0 | Status: SHIPPED | OUTPATIENT
Start: 2024-04-05

## 2024-04-05 RX ORDER — DIPHENHYDRAMINE HYDROCHLORIDE 50 MG/ML
12.5 INJECTION INTRAMUSCULAR; INTRAVENOUS ONCE AS NEEDED
Status: DISCONTINUED | OUTPATIENT
Start: 2024-04-05 | End: 2024-04-05 | Stop reason: HOSPADM

## 2024-04-05 RX ORDER — HYDRALAZINE HYDROCHLORIDE 20 MG/ML
5 INJECTION INTRAMUSCULAR; INTRAVENOUS
Status: DISCONTINUED | OUTPATIENT
Start: 2024-04-05 | End: 2024-04-05 | Stop reason: HOSPADM

## 2024-04-05 RX ORDER — FENTANYL CITRATE 50 UG/ML
INJECTION, SOLUTION INTRAMUSCULAR; INTRAVENOUS AS NEEDED
Status: DISCONTINUED | OUTPATIENT
Start: 2024-04-05 | End: 2024-04-05 | Stop reason: SURG

## 2024-04-05 RX ORDER — HYDROCODONE BITARTRATE AND ACETAMINOPHEN 7.5; 325 MG/1; MG/1
1 TABLET ORAL ONCE
Status: COMPLETED | OUTPATIENT
Start: 2024-04-05 | End: 2024-04-05

## 2024-04-05 RX ORDER — HYDRALAZINE HYDROCHLORIDE 20 MG/ML
5 INJECTION INTRAMUSCULAR; INTRAVENOUS
Status: DISCONTINUED | OUTPATIENT
Start: 2024-04-05 | End: 2024-04-05

## 2024-04-05 RX ORDER — FENTANYL CITRATE 50 UG/ML
100 INJECTION, SOLUTION INTRAMUSCULAR; INTRAVENOUS
Status: DISCONTINUED | OUTPATIENT
Start: 2024-04-05 | End: 2024-04-05

## 2024-04-05 RX ADMIN — FENTANYL CITRATE 25 MCG: 50 INJECTION, SOLUTION INTRAMUSCULAR; INTRAVENOUS at 09:56

## 2024-04-05 RX ADMIN — SUGAMMADEX 200 MG: 100 INJECTION, SOLUTION INTRAVENOUS at 09:45

## 2024-04-05 RX ADMIN — FENTANYL CITRATE 25 MCG: 50 INJECTION, SOLUTION INTRAMUSCULAR; INTRAVENOUS at 10:14

## 2024-04-05 RX ADMIN — KETOROLAC TROMETHAMINE 30 MG: 30 INJECTION, SOLUTION INTRAMUSCULAR at 09:45

## 2024-04-05 RX ADMIN — CLINDAMYCIN PHOSPHATE 900 MG: 900 INJECTION, SOLUTION INTRAVENOUS at 08:55

## 2024-04-05 RX ADMIN — HYDROCODONE BITARTRATE AND ACETAMINOPHEN 1 TABLET: 7.5; 325 TABLET ORAL at 12:32

## 2024-04-05 RX ADMIN — Medication 100 MCG: at 09:33

## 2024-04-05 RX ADMIN — FENTANYL CITRATE 50 MCG: 50 INJECTION, SOLUTION INTRAMUSCULAR; INTRAVENOUS at 10:33

## 2024-04-05 RX ADMIN — IPRATROPIUM BROMIDE AND ALBUTEROL SULFATE 3 ML: .5; 3 SOLUTION RESPIRATORY (INHALATION) at 11:07

## 2024-04-05 RX ADMIN — SODIUM CHLORIDE, POTASSIUM CHLORIDE, SODIUM LACTATE AND CALCIUM CHLORIDE 1000 ML: 600; 310; 30; 20 INJECTION, SOLUTION INTRAVENOUS at 07:25

## 2024-04-05 RX ADMIN — FENTANYL CITRATE 50 MCG: 50 INJECTION, SOLUTION INTRAMUSCULAR; INTRAVENOUS at 08:55

## 2024-04-05 RX ADMIN — MIDAZOLAM HYDROCHLORIDE 2 MG: 2 INJECTION, SOLUTION INTRAMUSCULAR; INTRAVENOUS at 08:43

## 2024-04-05 RX ADMIN — PROPOFOL 25 MCG/KG/MIN: 10 INJECTION, EMULSION INTRAVENOUS at 08:55

## 2024-04-05 RX ADMIN — PROPOFOL 200 MG: 10 INJECTION, EMULSION INTRAVENOUS at 08:47

## 2024-04-05 RX ADMIN — Medication 100 MCG: at 09:42

## 2024-04-05 RX ADMIN — ONDANSETRON 4 MG: 2 INJECTION INTRAMUSCULAR; INTRAVENOUS at 08:55

## 2024-04-05 RX ADMIN — DEXAMETHASONE SODIUM PHOSPHATE 10 MG: 4 INJECTION, SOLUTION INTRAMUSCULAR; INTRAVENOUS at 08:55

## 2024-04-05 RX ADMIN — ROCURONIUM BROMIDE 50 MG: 10 INJECTION, SOLUTION INTRAVENOUS at 08:47

## 2024-04-05 RX ADMIN — Medication 100 MCG: at 09:25

## 2024-04-05 RX ADMIN — LIDOCAINE HYDROCHLORIDE 100 MG: 20 INJECTION, SOLUTION EPIDURAL; INFILTRATION; INTRACAUDAL; PERINEURAL at 08:47

## 2024-04-05 NOTE — H&P
04/05/24   Foot and Ankle Surgery - Pre-Op H&P  Provider: Dr. Leon Corbin DPM  Location: [facility]    Subjective:  Itzel Hogan is a 53 y.o. female.     CC: Right foot pain    HPI: Continued pain involving the right foot.  Patient presents for surgery today.  No new issues or concerns    Allergies   Allergen Reactions    Penicillins Hives    Prochlorperazine Dizziness    Stadol [Butorphanol] Dizziness    Lisinopril Swelling    Meat Extract Other (See Comments)     Alpha-gal positive    Zinc Gelatin [Zinc] Other (See Comments)     Alpha-gal positive    Cephalexin Hives       Past Medical History:   Diagnosis Date    Allergic     Allergic reaction to alpha-gal     Allergies     Anemia     B12 deficiency     Bunion     Difficulty walking     Diverticulitis     Diverticulosis April 1 , 2023    Fallen arches     GERD (gastroesophageal reflux disease)     Hammer toe     Hyperlipidemia     Hypertension     Migraine     Seasonal allergies        Past Surgical History:   Procedure Laterality Date    CARPAL TUNNEL RELEASE Bilateral     COLONOSCOPY      ENDOSCOPY      SINUS SURGERY      TONSILLECTOMY      WISDOM TOOTH EXTRACTION         Family History   Problem Relation Age of Onset    Stroke Mother     Heart failure Mother     Cancer Mother     Asthma Mother     Hyperlipidemia Mother     Hypertension Mother     Skin cancer Father     Anemia Father     Asthma Sister     Heart attack Maternal Grandmother     Heart attack Maternal Grandfather        Social History     Socioeconomic History    Marital status:    Tobacco Use    Smoking status: Never     Passive exposure: Never    Smokeless tobacco: Never   Vaping Use    Vaping status: Never Used   Substance and Sexual Activity    Alcohol use: Yes     Comment: rare    Drug use: Never    Sexual activity: Yes     Partners: Male     Birth control/protection: Condom          Current Facility-Administered Medications:     clindamycin (CLEOCIN) 900 mg in sodium  "chloride 0.9% 50 mL IVPB (premix), 900 mg, Intravenous, Once, HERMES Corbin, MEKA    lactated ringers infusion 1,000 mL, 1,000 mL, Intravenous, Continuous, HERMES Corbin DPM    lidocaine (XYLOCAINE) 1 % injection 0.5 mL, 0.5 mL, Intradermal, Once PRN, HERMES Corbin, MEKA    sodium chloride 0.9 % flush 10 mL, 10 mL, Intravenous, PRN, HERMES Corbin DPM    Review of Systems:  General: Denies fever, chills, fatigue, and weakness.  Eyes: Denies vision loss, blurry vision, and excessive redness.  ENT: Denies hearing issues and difficulty swallowing.  Cardiovascular: Denies palpitations, chest pain, or syncopal episodes.  Respiratory: Denies shortness of breath, wheezing, and coughing.  GI: Denies abdominal pain, nausea, and vomiting.   : Denies frequency, hematuria, and urgency.  Musculoskeletal: Denies muscle cramps, joint pains, and stiffness.  Derm: Denies rash, open wounds, or suspicious lesions.  Neuro: Denies headaches, numbness, loss of coordination, and tremors.  Psych: Denies anxiety and depression.  Endocrine: Denies temperature intolerance and changes in appetite.  Heme: Denies bleeding disorders or abnormal bruising.     Objective   Ht 157.5 cm (62\")   Wt 86.2 kg (190 lb)   BMI 34.75 kg/m²     Foot/Ankle Exam  GENERAL  Orientation:  AAOx3  Affect:  appropriate     VASCULAR      Right Foot Vascularity   Normal vascular exam    Dorsalis pedis:  2+  Posterior tibial:  2+  Skin temperature:  warm  Edema grading:  None  CFT:  < 3 seconds  Pedal hair growth:  Present  Varicosities:  none      Left Foot Vascularity   Normal vascular exam    Dorsalis pedis:  2+  Posterior tibial:  2+  Skin temperature:  warm  Edema grading:  None  CFT:  < 3 seconds  Pedal hair growth:  Present  Varicosities:  none     NEUROLOGIC      Right Foot Neurologic   Light touch sensation: normal  Hot/Cold sensation: normal  Achilles reflex:  2+      Left Foot Neurologic   Light touch sensation: normal  Hot/Cold sensation:  " normal  Achilles reflex:  2+     MUSCULOSKELETAL      Right Foot Musculoskeletal   Arch:  Normal      Left Foot Musculoskeletal   Arch:  Normal     MUSCLE STRENGTH      Right Foot Muscle Strength   Normal strength    Foot dorsiflexion:  5  Foot plantar flexion:  5  Foot inversion:  5  Foot eversion:  5      Left Foot Muscle Strength   Normal strength    Foot dorsiflexion:  5  Foot plantar flexion:  5  Foot inversion:  5  Foot eversion:  5     DERMATOLOGIC       Right Foot Dermatologic   Skin  Right foot skin is intact.       Left Foot Dermatologic   Skin  Left foot skin is intact.      TESTS      Right Foot Tests   Anterior drawer: negative  Varus tilt: negative      Left Foot Tests   Anterior drawer: negative  Varus tilt: negative     Right foot additional comments: 04/27/2023: Moderate equinus contracture with knee extended and flexed. Discomfort with palpation involving the plantar aspect of the second metatarsophalangeal joint region. Mild hammer digital deformity and mild bunion deformity with dorsal medial bony prominence.     06/01/2023  Continued discomfort involving the 2nd metatarsophalangeal joint of the right foot. Calf discomfort and significant equinus contracture with knee extended and flexed.     07/13/2023  Significant discomfort with palpation involving the 2nd metatarsophalangeal joint region. Mild progressive hammertoe deformity involving the right 2nd digit.     08/10/2023  Less discomfort with palpation involving the second metatarsophalangeal joint region.     12/07/2023  Continued discomfort with palpation involving the plantar aspect of the second metatarsophalangeal joint region. No significant swelling or inflammation.     02/12/2024  Continued discomfort involving the 2nd metatarsophalangeal joint region.              Assessment & Plan     Metatarsalgia, right foot    Plantar plate injury, right, sequela    Hammer toe of right foot    Bunion, right    Continued pain involving the right  foot.  Will proceed with surgery as planned.    Note is dictated utilizing voice recognition software. Unfortunately this leads to occasional typographical errors. I apologize in advance if the situation occurs. If questions occur please do not hesitate to call our office.

## 2024-04-05 NOTE — ANESTHESIA PROCEDURE NOTES
Airway  Urgency: elective    Airway not difficult    General Information and Staff    Patient location during procedure: OR  CRNA/CAA: Lazara Garcia CRNA    Indications and Patient Condition  Indications for airway management: airway protection    Preoxygenated: yes  Mask difficulty assessment: 1 - vent by mask    Final Airway Details  Final airway type: endotracheal airway      Successful airway: ETT  Cuffed: yes   Successful intubation technique: video laryngoscopy  Facilitating devices/methods: intubating stylet  Endotracheal tube insertion site: oral  Blade: Duncan  Blade size: 3  ETT size (mm): 7.0  Cormack-Lehane Classification: grade I - full view of glottis  Placement verified by: capnometry   Cuff volume (mL): 9  Measured from: teeth  ETT/EBT  to teeth (cm): 22  Number of attempts at approach: 1  Assessment: lips, teeth, and gum same as pre-op and atraumatic intubation

## 2024-04-05 NOTE — NURSING NOTE
"Dr Michelle called. Patient feels she is unable to take a deep breath and something is \"stuck\" in her throat. Patient has been on room here with 02 sat above 94% and no signs or symptoms of respiratory distress. Duoneb was given prior to call with no relief per patient of symptoms. Dr Michelle in route to see patient.   "

## 2024-04-05 NOTE — ANESTHESIA POSTPROCEDURE EVALUATION
Patient: Itzel Hogan    Procedure Summary       Date: 04/05/24 Room [facility]    Anesthesia Start: 0843 Anesthesia Stop: 1019    Procedures:       BUNIONECTOMY WITH DISTAL METATARSAL OSTEOTOMY AND INTERNAL FIXATION (Right: Foot)      WEIL METATARSAL OSTEOTOMY, PLANTAR PLATE REPAIR (Right: Foot)      HAMMER DIGIT CORRECTION TO SECOND RAY (Right: Toes) Diagnosis:       Metatarsalgia, right foot      Plantar plate injury, right, sequela      Hammer toe of right foot      Bunion, right      (Metatarsalgia, right foot [M77.41])      (Plantar plate injury, right, sequela [S99.921S])      (Hammer toe of right foot [M20.41])      (Bunion, right [M21.611])    Surgeons: HERMES Corbin DPM Provider: [place] Michelle MD    Anesthesia Type: general ASA Status: 3            Anesthesia Type: general    Vitals  Vitals Value Taken Time   /73 04/05/24 1204   Temp 98 °F (36.7 °C) 04/05/24 1204   Pulse 84 04/05/24 1206   Resp 15 04/05/24 1204   SpO2 99 % 04/05/24 1206   Vitals shown include unfiled device data.        Post Anesthesia Care and Evaluation    Patient location during evaluation: PACU  Patient participation: complete - patient participated  Level of consciousness: awake  Pain score: 0  Pain management: adequate  Anesthetic complications: No anesthetic complications  PONV Status: none  Cardiovascular status: acceptable  Respiratory status: acceptable  Hydration status: acceptable

## 2024-04-05 NOTE — OP NOTE
Operative Note   Foot and Ankle Surgery   Provider: Dr. Leon Corbin   Location: [facility]      Procedure:  1. ***  2. ***  3. ***    Pre-operative Diagnosis:   1. ***  2. ***  3. ***    Post-operative Diagnosis: Same    Surgeon: Leon Corbin    Assistant: ***    Anesthesia: ***    Implants: ***    Findings: ***    Specimen: ***    Blood Loss: Less than 5cc    Complications: None    Post Op Plan: ***    Summary:    ***    Procedure, risks, complications, and goals were discussed with the patient at bedside.  Risks include but are not limited to infection, complications from anesthesia (including death), chronic pain or numbness, hematoma/seroma, deep vein thrombosis, wound complications, and potential for additional surgical procedures.  Patient understands and elects to proceed with surgery at this time. Informed consent was obtained before proceeding to the operating suite.  All questions were answered to the patient's satisfaction. No guarantees or assurances were given or implied.    ***      Dr. Leon Corbin, MEKA  [facility]  [phone]    Note is dictated utilizing voice recognition software. Unfortunately this leads to occasional typographical errors. I apologize in advance if the situation occurs. If questions occur please do not hesitate to call our office.     discussed risks and benefits.  Patient understands that she may continue to have discomfort involving the second metatarsal phalangeal joint secondary to scar tissue formation.  She understands that she may require additional surgeries if symptoms persist.    Procedure, risks, complications, and goals were discussed with the patient at bedside.  Risks include but are not limited to infection, complications from anesthesia (including death), chronic pain or numbness, hematoma/seroma, deep vein thrombosis, wound complications, and potential for additional surgical procedures.  Patient understands and elects to proceed with surgery at this time. Informed consent was obtained before proceeding to the operating suite.  All questions were answered to the patient's satisfaction. No guarantees or assurances were given or implied.    Procedure:    Patient was brought to the operating room and placed on the operative table in a supine position.  A pneumatic tourniquet was placed about the patient's ankle. A bump was placed under the patient's ipsilateral hip.  The operative limb was scrubbed prepped and draped in the usual sterile fashion.  A formal timeout was conducted prior skin incision.  The limb was elevated and exsanguinated and the pneumatic tourniquet was inflated to 250 mmHg.    Attention was then directed to the dorsal medial aspect of the 1st metatarsophalangeal joint.  A linear incision was performed through the skin to the level of the subcutaneous tissue layer parallel to the extensor hallucis longus tendon.  Blunt dissection was performed through the subcutaneous tissue layer. All vital structures were identified and  retracted; bleeders were cauterized as needed.  A full-thickness incision was then performed through the capsule to the level of bone.  Capsular structures were retracted giving adequate exposure to the 1st metatarsophalangeal joint.  The joint was inspected and showed no gross signs of  degeneration.  A sagittal saw was then used to remove the dorsal medial eminence.  The saw was passed from distal to proximal leaving a flat surface to the medial aspect of the 1st metatarsal.    Attention was then directed to the 1st interspace via the same skin incision for a lateral release.  Sharp dissection was performed to the level of the adductor hallucis tendon the tendon was identified and isolated.  The tendon was transected using a #15 blade.  The fibular sesamoid was freed from the underlying connective tissue.  Once the lateral release was fully completed, that hallux was noted to drift into a more medial corrected position.    Attention was then placed back to the medial aspect of the joint, where a guidewire was introduced in to the head of the 1st metatarsal to serve as an axis guide.  Dorsal proximal and dorsal plantar  osteotomies into the head of the 1st metatarsal were then performed.  The osteotomies were made through and through, releasing the capital fragment.  The K-wire was then removed.  The capital fragment was translated laterally, thus functionally decreasing the intermetatarsal angle. Adequate reduction was confirmed and the head was temporarily fixated with another k-wire. Definitive fixation was performed utilizing a 3.0 mm cannulated screw. The screw was placed from a dorsal proximal to plantar distal orientation across the osteotomy.  The joint was checked and showed no extension of the screw into the joint. The residual medial bony prominence was resected with the sagittal saw. The wound was irrigated with copious amounts of normal saline.  Capsule balancing was performed and repaired utilizing a 2-0 Vicryl in a simple interrupted manner.  The subcutaneous tissue was closed with a 4-0 Vicryl in a similar manner.  Skin was reapproximated utilizing 3-0 nylon.     Attention was then directed to the dorsal aspect of the second metatarsal phalangeal joint.  A curvilinear incision was  performed over the lateral aspect of the distal second metatarsal extending distally onto the second digit.  Incision was performed through the skin and subcutaneous tissues to the level of the joint.  A linear capsulotomy was performed over the second metatarsal phalangeal joint.  A moderate amount of inflammatory fluid was noted.  A sagittal saw was then used to perform the Weil osteotomy from a dorsal distal to plantar proximal orientation.  The capital fragment was retracted proximally allowing for visualization of the plantar aspect of the joint.  Disruption involving the medial aspect of the plantar plate was visualized.  No surrounding tissue was evident to the plantar plate region.  A significant amount of stress and attenuation was noted to the lateral band of the plantar plate.  The lateral aspect was released from the base of the second toe.  The digit was noted to sit in a relative rectus orientation precluding any further management of the soft tissue.  The capital fragment of the second metatarsal was reduced in the appropriate orientation for the forefoot parabola.  Definitive fixation was achieved utilizing a 2.0 mm pop-off screw from a dorsal to plantar orientation.  A rongeur was used to remove redundant bone from the superior aspect of the metatarsal.    Finally, attention was directed to the second digit.  A transverse capsulotomy was performed at the proximal interphalangeal joint region.  A sagittal saw was used to remove the distal aspect of the proximal phalanx as well as the base of the middle phalanx.  Fixation was achieved utilizing a C2 implant placed in the standard fashion.  Fluoroscopic guidance was used to identify appropriate placement of the implant.  Final evaluation was performed showing rectus orientation involving the first and second ray.  The incision site was irrigated with copious amounts of normal saline.  The extensor tendon at the level of the proximal to phalangeal joint  was repaired utilizing a 4-0 Vicryl in a simple interrupted manner.  The second MTPJ capsule was repaired with a 2-0 Vicryl.  Subcutaneous tissues were closed with 4-0 Vicryl and the skin was repaired with a 3-0 nylon.    A postoperative block was placed with 20 cc of 0.5% Marcaine plain.  The incision sites were dressed with Xeroform and sterile compressive dressings.  The ankle tourniquet was released and a prompt hyperemic response was noted to the digits. The patient tolerated the procedure and anesthesia well.  The patient is transferred from the operating room to the recovery room with neurovascular status intact to the operative limb.      Dr. Leon Corbin, DPM  [facility]  [phone]    Note is dictated utilizing voice recognition software. Unfortunately this leads to occasional typographical errors. I apologize in advance if the situation occurs. If questions occur please do not hesitate to call our office.

## 2024-04-05 NOTE — ANESTHESIA PREPROCEDURE EVALUATION
Anesthesia Evaluation     Patient summary reviewed and Nursing notes reviewed   NPO Solid Status: > 8 hours             Airway   Mallampati: II  TM distance: >3 FB  Neck ROM: full  No difficulty expected  Dental - normal exam     Pulmonary - negative pulmonary ROS and normal exam   (-) not a smoker  Cardiovascular - normal exam    ECG reviewed    (+) hypertension  (-) angina, GARZA      Neuro/Psych- negative ROS  GI/Hepatic/Renal/Endo    (+) obesity, GERD    Musculoskeletal (-) negative ROS    Abdominal  - normal exam    Bowel sounds: normal.   Substance History - negative use     OB/GYN negative ob/gyn ROS         Other                    Anesthesia Plan    ASA 3     general       Anesthetic plan, risks, benefits, and alternatives have been provided, discussed and informed consent has been obtained with: patient.    CODE STATUS:

## 2024-04-18 ENCOUNTER — OFFICE VISIT (OUTPATIENT)
Dept: PODIATRY | Facility: CLINIC | Age: 54
End: 2024-04-18
Payer: COMMERCIAL

## 2024-04-18 ENCOUNTER — TELEPHONE (OUTPATIENT)
Dept: PODIATRY | Facility: CLINIC | Age: 54
End: 2024-04-18

## 2024-04-18 VITALS — WEIGHT: 193 LBS | HEIGHT: 62 IN | BODY MASS INDEX: 35.51 KG/M2 | RESPIRATION RATE: 20 BRPM

## 2024-04-18 DIAGNOSIS — S99.921S PLANTAR PLATE INJURY, RIGHT, SEQUELA: ICD-10-CM

## 2024-04-18 DIAGNOSIS — M20.41 HAMMER TOE OF RIGHT FOOT: ICD-10-CM

## 2024-04-18 DIAGNOSIS — M77.41 METATARSALGIA, RIGHT FOOT: ICD-10-CM

## 2024-04-18 DIAGNOSIS — E78.2 MIXED HYPERLIPIDEMIA: ICD-10-CM

## 2024-04-18 DIAGNOSIS — M21.611 BUNION, RIGHT: ICD-10-CM

## 2024-04-18 DIAGNOSIS — M79.671 RIGHT FOOT PAIN: Primary | ICD-10-CM

## 2024-04-18 PROCEDURE — 99024 POSTOP FOLLOW-UP VISIT: CPT | Performed by: PODIATRIST

## 2024-04-18 RX ORDER — ATORVASTATIN CALCIUM 10 MG/1
TABLET, FILM COATED ORAL
Qty: 90 TABLET | Refills: 3 | Status: SHIPPED | OUTPATIENT
Start: 2024-04-18

## 2024-04-18 NOTE — PROGRESS NOTES
04/18/2024  Foot and Ankle Surgery - Established Patient/Follow-up  Provider: Dr. Leon Corbin, MEKA  Location: [facility]    Subjective:  Itzel Hogan is a 53 y.o. female.     Chief Complaint   Patient presents with    Right Foot - Post-op     4/5/2024   Open bunionectomy with distal metatarsal osteotomy and internal fixation, right foot  2.  Second metatarsal Weil osteotomy and fixation, right  3.  Hammer digit repair with implant, right second toe  4.  Open management of plantar plate injury, right second metatarsal phalangeal joint      Post-op     MER Rodas aprn  3/26/2024       HPI: Patient states that she is doing well after surgery.  She states that she did inadvertently place weight to the right foot after surgery.  She has noticed improvement in pain.  Denies any additional issues or concerns.  She has remained off weightbearing as instructed    Allergies   Allergen Reactions    Penicillins Hives    Prochlorperazine Dizziness    Stadol [Butorphanol] Dizziness    Lisinopril Swelling    Meat Extract Other (See Comments)     Alpha-gal positive    Zinc Gelatin [Zinc] Other (See Comments)     Alpha-gal positive    Cephalexin Hives       Current Outpatient Medications on File Prior to Visit   Medication Sig Dispense Refill    amLODIPine (NORVASC) 5 MG tablet Take 1 tablet by mouth Daily. 90 tablet 1    cetirizine (zyrTEC) 10 MG tablet Take 1 tablet by mouth Daily.      EPINEPHrine (EPIPEN) 0.3 MG/0.3ML solution auto-injector injection See Admin Instructions. for allergic reaction      fluticasone (Flonase Allergy Relief) 50 MCG/ACT nasal spray 1 spray into the nostril(s) as directed by provider Daily.      metoprolol succinate XL (TOPROL-XL) 25 MG 24 hr tablet Take 1 tablet by mouth Every Morning.      omeprazole OTC (PriLOSEC OTC) 20 MG EC tablet Take 1 tablet by mouth Daily. 90 tablet 1    Probiotic Product (Probiotic Advanced) capsule Take 1 capsule by mouth Daily. Specific for vaginal  "health      HYDROcodone-acetaminophen (NORCO) 7.5-325 MG per tablet Take 1 tablet by mouth Every 6 (Six) Hours As Needed for Moderate Pain (Pain). (Patient not taking: Reported on 4/18/2024) 28 tablet 0     No current facility-administered medications on file prior to visit.       Objective   Resp 20   Ht 157.5 cm (62\")   Wt 87.5 kg (193 lb)   BMI 35.30 kg/m²     Foot/Ankle Exam    GENERAL  Orientation:  AAOx3  Affect:  appropriate    VASCULAR     Right Foot Vascularity   Normal vascular exam    Dorsalis pedis:  2+  Posterior tibial:  2+  Skin temperature:  warm  Edema grading:  None  CFT:  < 3 seconds  Pedal hair growth:  Present  Varicosities:  none     Left Foot Vascularity   Normal vascular exam    Dorsalis pedis:  2+  Posterior tibial:  2+  Skin temperature:  warm  Edema grading:  None  CFT:  < 3 seconds  Pedal hair growth:  Present  Varicosities:  none     NEUROLOGIC     Right Foot Neurologic   Light touch sensation: normal  Hot/Cold sensation: normal  Achilles reflex:  2+     Left Foot Neurologic   Light touch sensation: normal  Hot/Cold sensation:  normal  Achilles reflex:  2+    MUSCULOSKELETAL     Right Foot Musculoskeletal   Arch:  Normal     Left Foot Musculoskeletal   Arch:  Normal    MUSCLE STRENGTH     Right Foot Muscle Strength   Normal strength    Foot dorsiflexion:  5  Foot plantar flexion:  5  Foot inversion:  5  Foot eversion:  5     Left Foot Muscle Strength   Normal strength    Foot dorsiflexion:  5  Foot plantar flexion:  5  Foot inversion:  5  Foot eversion:  5    DERMATOLOGIC      Right Foot Dermatologic   Skin  Right foot skin is intact.      Left Foot Dermatologic   Skin  Left foot skin is intact.     TESTS     Right Foot Tests   Anterior drawer: negative  Varus tilt: negative     Left Foot Tests   Anterior drawer: negative  Varus tilt: negative     Right foot additional comments: 04/27/2023: Moderate equinus contracture with knee extended and flexed. Discomfort with palpation involving " the plantar aspect of the second metatarsophalangeal joint region. Mild hammer digital deformity and mild bunion deformity with dorsal medial bony prominence.    06/01/2023  Continued discomfort involving the 2nd metatarsophalangeal joint of the right foot. Calf discomfort and significant equinus contracture with knee extended and flexed.    07/13/2023  Significant discomfort with palpation involving the 2nd metatarsophalangeal joint region. Mild progressive hammertoe deformity involving the right 2nd digit.    08/10/2023  Less discomfort with palpation involving the second metatarsophalangeal joint region.    12/07/2023  Continued discomfort with palpation involving the plantar aspect of the second metatarsophalangeal joint region. No significant swelling or inflammation.    02/12/2024  Continued discomfort involving the 2nd metatarsophalangeal joint region.    04/18/2024: Two incision sites are dry and stable with intact sutures in the musculoskeletal system. No evidence of dehiscence or infection. Mild swelling to the foot. No significant swelling. Rectus alignment of the digits is good.    Assessment & Plan   Diagnoses and all orders for this visit:    1. Right foot pain (Primary)  -     XR Foot 3+ View Right    2. Hammer toe of right foot  -     XR Foot 3+ View Right    3. Metatarsalgia, right foot  -     XR Foot 3+ View Right    4. Plantar plate injury, right, sequela    5. Bunion, right    Other orders  -     IMAGING SCANNED      The patient is a 53-year-old female who is seen 2 weeks after right foot surgery. X-ray of right foot were reviewed with patient with no concerning findings. She will have her sutures removed today. We discussed post-surgery, she is allowed to shower and bathe. She is advised to start a range of motion exercises, although at a slower pace. I did recommend rest, ice, compression, elevation, and anti-inflammatories for symptom management. She is allowed to utilize the PowerStep insert  within the boot. She is advised to continue using the knee scooter for longer distances. A compression sock will be provided. Light massage with Mederma or vitamin E oil, is recommended to aid in scarring. I will be providing her with handicap pass will be provided. The patient is scheduled for a follow-up visit in 2 weeks, during which an x-ray will be obtained.    Orders Placed This Encounter   Procedures    IMAGING SCANNED    XR Foot 3+ View Right     Order Specific Question:   Reason for Exam:     Answer:   right foot post op second and great toe. room 13. wb per isela     Order Specific Question:   Does this patient have a diabetic monitoring/medication delivering device on?     Answer:   No     Order Specific Question:   Release to patient     Answer:   Routine Release [[id number]]          Note is dictated utilizing voice recognition software. Unfortunately this leads to occasional typographical errors. I apologize in advance if the situation occurs. If questions occur please do not hesitate to call our office.    Transcribed from ambient dictation for HERMES Corbin DPM by Malini Dorado.  04/18/24   16:16 EDT    Patient or patient representative verbalized consent to the visit recording.  I have personally performed the services described in this document as transcribed by the above individual, and it is both accurate and complete.   Answers submitted by the patient for this visit:  Other (Submitted on 4/11/2024)  Please describe your symptoms.: Post op from foot sx  Have you had these symptoms before?: No  How long have you been having these symptoms?: 1-2 weeks  Please list any medications you are currently taking for this condition.: Tylenol/ ibuprofen  Primary Reason for Visit (Submitted on 4/11/2024)  What is the primary reason for your visit?: Other

## 2024-04-18 NOTE — TELEPHONE ENCOUNTER
Called pt asking her to come in tomorrow to remove suture. She does not need an apt and I can see her at any time Friday. Please get me if she comes in!

## 2024-05-02 ENCOUNTER — OFFICE VISIT (OUTPATIENT)
Dept: PODIATRY | Facility: CLINIC | Age: 54
End: 2024-05-02
Payer: COMMERCIAL

## 2024-05-02 DIAGNOSIS — M20.41 HAMMER TOE OF RIGHT FOOT: ICD-10-CM

## 2024-05-02 DIAGNOSIS — M79.671 RIGHT FOOT PAIN: Primary | ICD-10-CM

## 2024-05-02 DIAGNOSIS — S99.921S PLANTAR PLATE INJURY, RIGHT, SEQUELA: ICD-10-CM

## 2024-05-02 DIAGNOSIS — M77.41 METATARSALGIA, RIGHT FOOT: ICD-10-CM

## 2024-05-02 PROCEDURE — 99024 POSTOP FOLLOW-UP VISIT: CPT | Performed by: PODIATRIST

## 2024-05-02 NOTE — PROGRESS NOTES
05/02/2024  Foot and Ankle Surgery - Established Patient/Follow-up  Provider: Dr. Leon Corbin DPM  Location: [facility]    Subjective:  Itzel Hogan is a 53 y.o. female.     Chief Complaint   Patient presents with    Right Foot - Post-op     4/5/2024   Open bunionectomy with distal metatarsal osteotomy and internal fixation, right foot  2.  Second metatarsal Weil osteotomy and fixation, right  3.  Hammer digit repair with implant, right second toe  4.  Open management of plantar plate injury, right second metatarsal phalangeal joint      Follow-up     MER Rodas aprn 3/26/2024       History of Present Illness  The patient presents for postoperative evaluation. She is accompanied by an adult male.    The patient reports persistent swelling on the plantar aspect of her foot, accompanied by a sensation of tightness and spasms at the site of the implant. Despite attempts to partial ambulation, she observed a purplish discoloration of her foot the previous night.      Allergies   Allergen Reactions    Penicillins Hives    Prochlorperazine Dizziness    Stadol [Butorphanol] Dizziness    Lisinopril Swelling    Meat Extract Other (See Comments)     Alpha-gal positive    Zinc Gelatin [Zinc] Other (See Comments)     Alpha-gal positive    Cephalexin Hives       Current Outpatient Medications on File Prior to Visit   Medication Sig Dispense Refill    amLODIPine (NORVASC) 5 MG tablet Take 1 tablet by mouth Daily. 90 tablet 1    atorvastatin (LIPITOR) 10 MG tablet TAKE 1 TABLET DAILY 90 tablet 3    cetirizine (zyrTEC) 10 MG tablet Take 1 tablet by mouth Daily.      EPINEPHrine (EPIPEN) 0.3 MG/0.3ML solution auto-injector injection See Admin Instructions. for allergic reaction      fluticasone (Flonase Allergy Relief) 50 MCG/ACT nasal spray 1 spray into the nostril(s) as directed by provider Daily.      HYDROcodone-acetaminophen (NORCO) 7.5-325 MG per tablet Take 1 tablet by mouth Every 6 (Six) Hours As  Needed for Moderate Pain (Pain). 28 tablet 0    metoprolol succinate XL (TOPROL-XL) 25 MG 24 hr tablet Take 1 tablet by mouth Every Morning.      omeprazole OTC (PriLOSEC OTC) 20 MG EC tablet Take 1 tablet by mouth Daily. 90 tablet 1    Probiotic Product (Probiotic Advanced) capsule Take 1 capsule by mouth Daily. Specific for vaginal health       No current facility-administered medications on file prior to visit.       Objective   There were no vitals taken for this visit.    Foot/Ankle Exam  Physical Exam  GENERAL  Orientation:  AAOx3  Affect:  appropriate     VASCULAR      Right Foot Vascularity   Normal vascular exam    Dorsalis pedis:  2+  Posterior tibial:  2+  Skin temperature:  warm  Edema grading:  None  CFT:  < 3 seconds  Pedal hair growth:  Present  Varicosities:  none      Left Foot Vascularity   Normal vascular exam    Dorsalis pedis:  2+  Posterior tibial:  2+  Skin temperature:  warm  Edema grading:  None  CFT:  < 3 seconds  Pedal hair growth:  Present  Varicosities:  none     NEUROLOGIC      Right Foot Neurologic   Light touch sensation: normal  Hot/Cold sensation: normal  Achilles reflex:  2+      Left Foot Neurologic   Light touch sensation: normal  Hot/Cold sensation:  normal  Achilles reflex:  2+     MUSCULOSKELETAL      Right Foot Musculoskeletal   Arch:  Normal      Left Foot Musculoskeletal   Arch:  Normal     MUSCLE STRENGTH      Right Foot Muscle Strength   Normal strength    Foot dorsiflexion:  5  Foot plantar flexion:  5  Foot inversion:  5  Foot eversion:  5      Left Foot Muscle Strength   Normal strength    Foot dorsiflexion:  5  Foot plantar flexion:  5  Foot inversion:  5  Foot eversion:  5     DERMATOLOGIC       Right Foot Dermatologic   Skin  Right foot skin is intact.       Left Foot Dermatologic   Skin  Left foot skin is intact.      TESTS      Right Foot Tests   Anterior drawer: negative  Varus tilt: negative      Left Foot Tests   Anterior drawer: negative  Varus tilt:  negative     Right foot additional comments: 04/27/2023: Moderate equinus contracture with knee extended and flexed. Discomfort with palpation involving the plantar aspect of the second metatarsophalangeal joint region. Mild hammer digital deformity and mild bunion deformity with dorsal medial bony prominence.     06/01/2023  Continued discomfort involving the 2nd metatarsophalangeal joint of the right foot. Calf discomfort and significant equinus contracture with knee extended and flexed.     07/13/2023  Significant discomfort with palpation involving the 2nd metatarsophalangeal joint region. Mild progressive hammertoe deformity involving the right 2nd digit.     08/10/2023  Less discomfort with palpation involving the second metatarsophalangeal joint region.     12/07/2023  Continued discomfort with palpation involving the plantar aspect of the second metatarsophalangeal joint region. No significant swelling or inflammation.     02/12/2024  Continued discomfort involving the 2nd metatarsophalangeal joint region.     04/18/2024: Two incision sites are dry and stable with intact sutures in the musculoskeletal system. No evidence of dehiscence or infection. Mild swelling to the foot. No significant swelling. Rectus alignment of the digits is good.    5/2/2024: Incisions are well-healed.  Continued swelling involving the forefoot.  Moderate discomfort with palpation and soft tissue rigidity.  Rectus alignment of the digits.      Results  Imaging  X-ray of the foot shows the implant to the toe is holding the bone in place and the screw on the big toe is fine.    Assessment & Plan   Diagnoses and all orders for this visit:    1. Right foot pain (Primary)  -     XR Foot 3+ View Right    2. Hammer toe of right foot    3. Metatarsalgia, right foot    4. Plantar plate injury, right, sequela      Assessment & Plan    The patient's plantar plate, located on the plantar aspect of the foot, is attempting to consolidate and heal  independently. The swelling she is experiencing is likely due to the development of a substantial amount of scar tissue, which is not a cause for concern. The burning, numbness, tingling, spasms, and cramping sensations she is experiencing are within the normal range and are part of the postoperative process. The stability on the implants is extremely stable, as evidenced by the stability observed during the last examination. The orientation of her foot is satisfactory. The patient is advised to continue massaging the affected area using a tennis ball or a golf ball. She is gradually transitioning from crutches and to full weight-bearing in the boot over the next few days. She is encouraged to perform stretching exercises while watching television. Over-the-counter remedies such as rest, ice, compression, elevation, and anti-inflammatories are recommended to manage her symptoms. She is cautioned against increasing her activities on uneven terrain or aggressive terrain. She is encouraged to continue with her physical therapy exercises. Driving is permissible if she is comfortable in a regular shoe.    Follow-up  The patient is scheduled for a follow-up visit in 4 weeks, during which an x-ray will be conducted.             Patient or patient representative verbalized consent for the use of Ambient Listening during the visit with  HERMES Corbin DPM for chart documentation. 5/2/2024  18:44 EDT    HERMES Corbin DPM

## 2024-05-06 ENCOUNTER — OFFICE (AMBULATORY)
Dept: URBAN - METROPOLITAN AREA CLINIC 64 | Facility: CLINIC | Age: 54
End: 2024-05-06
Payer: COMMERCIAL

## 2024-05-06 VITALS
HEIGHT: 62 IN | WEIGHT: 177 LBS | SYSTOLIC BLOOD PRESSURE: 127 MMHG | HEART RATE: 83 BPM | DIASTOLIC BLOOD PRESSURE: 89 MMHG

## 2024-05-06 DIAGNOSIS — K21.9 GASTRO-ESOPHAGEAL REFLUX DISEASE WITHOUT ESOPHAGITIS: ICD-10-CM

## 2024-05-06 DIAGNOSIS — K57.90 DIVERTICULOSIS OF INTESTINE, PART UNSPECIFIED, WITHOUT PERFO: ICD-10-CM

## 2024-05-06 PROCEDURE — 99213 OFFICE O/P EST LOW 20 MIN: CPT

## 2024-05-06 RX ORDER — OMEPRAZOLE 20 MG/1
TABLET, DELAYED RELEASE ORAL
Qty: 90 | Refills: 4 | Status: ACTIVE

## 2024-05-08 RX ORDER — AMLODIPINE BESYLATE 5 MG/1
5 TABLET ORAL DAILY
Qty: 90 TABLET | Refills: 1 | Status: SHIPPED | OUTPATIENT
Start: 2024-05-08

## 2024-05-30 ENCOUNTER — OFFICE VISIT (OUTPATIENT)
Dept: PODIATRY | Facility: CLINIC | Age: 54
End: 2024-05-30
Payer: COMMERCIAL

## 2024-05-30 VITALS — WEIGHT: 193 LBS | HEIGHT: 62 IN | BODY MASS INDEX: 35.51 KG/M2 | RESPIRATION RATE: 20 BRPM

## 2024-05-30 DIAGNOSIS — M20.41 HAMMER TOE OF RIGHT FOOT: ICD-10-CM

## 2024-05-30 DIAGNOSIS — S99.921S PLANTAR PLATE INJURY, RIGHT, SEQUELA: ICD-10-CM

## 2024-05-30 DIAGNOSIS — M21.611 BUNION, RIGHT: ICD-10-CM

## 2024-05-30 DIAGNOSIS — M77.41 METATARSALGIA, RIGHT FOOT: ICD-10-CM

## 2024-05-30 DIAGNOSIS — M79.671 RIGHT FOOT PAIN: Primary | ICD-10-CM

## 2024-05-30 PROCEDURE — 99024 POSTOP FOLLOW-UP VISIT: CPT | Performed by: PODIATRIST

## 2024-05-30 NOTE — PROGRESS NOTES
05/30/2024  Foot and Ankle Surgery - Established Patient/Follow-up  Provider: Dr. Leon Corbin DPM  Location: [facility]    Subjective:  Itzel Hogan is a 53 y.o. female.     Chief Complaint   Patient presents with    Right Foot - Post-op     4/5/2024   Open bunionectomy with distal metatarsal osteotomy and internal fixation, right foot  2.  Second metatarsal Weil osteotomy and fixation, right  3.  Hammer digit repair with implant, right second toe  4.  Open management of plantar plate injury, right second metatarsal phalangeal joint      Post-op     MER Rodas aprn 3/26/2024       HPI: The patient is a 53-year-old female who presents for 2-month follow-up.    The patient reports a gradual improvement in her condition, with the exception of swelling, which is exacerbated when wearing the boot. She attempted to transition from the boot to a regular shoe today, however, she experienced tightness with the insert. Upon removal of the insert, she noticed an increased room for comfort. She notes that her foot tends to roll out to the left. She expresses concern about the persistent tightness in her foot and the healing process. She has been engaging in deep tissue massage.    Allergies   Allergen Reactions    Penicillins Hives    Prochlorperazine Dizziness    Stadol [Butorphanol] Dizziness    Lisinopril Swelling    Meat Extract Other (See Comments)     Alpha-gal positive    Zinc Gelatin [Zinc] Other (See Comments)     Alpha-gal positive    Cephalexin Hives       Current Outpatient Medications on File Prior to Visit   Medication Sig Dispense Refill    amLODIPine (NORVASC) 5 MG tablet Take 1 tablet by mouth Daily. 90 tablet 1    atorvastatin (LIPITOR) 10 MG tablet TAKE 1 TABLET DAILY 90 tablet 3    cetirizine (zyrTEC) 10 MG tablet Take 1 tablet by mouth Daily.      EPINEPHrine (EPIPEN) 0.3 MG/0.3ML solution auto-injector injection See Admin Instructions. for allergic reaction      fluticasone  "(Flonase Allergy Relief) 50 MCG/ACT nasal spray 1 spray into the nostril(s) as directed by provider Daily.      metoprolol succinate XL (TOPROL-XL) 25 MG 24 hr tablet Take 1 tablet by mouth Every Morning.      omeprazole OTC (PriLOSEC OTC) 20 MG EC tablet Take 1 tablet by mouth Daily. 90 tablet 1    Probiotic Product (Probiotic Advanced) capsule Take 1 capsule by mouth Daily. Specific for vaginal health       No current facility-administered medications on file prior to visit.       Objective   Resp 20   Ht 157.5 cm (62\")   Wt 87.5 kg (193 lb)   BMI 35.30 kg/m²     Foot/Ankle Exam  GENERAL  Orientation:  AAOx3  Affect:  appropriate     VASCULAR      Right Foot Vascularity   Normal vascular exam    Dorsalis pedis:  2+  Posterior tibial:  2+  Skin temperature:  warm  Edema grading:  None  CFT:  < 3 seconds  Pedal hair growth:  Present  Varicosities:  none      Left Foot Vascularity   Normal vascular exam    Dorsalis pedis:  2+  Posterior tibial:  2+  Skin temperature:  warm  Edema grading:  None  CFT:  < 3 seconds  Pedal hair growth:  Present  Varicosities:  none     NEUROLOGIC      Right Foot Neurologic   Light touch sensation: normal  Hot/Cold sensation: normal  Achilles reflex:  2+      Left Foot Neurologic   Light touch sensation: normal  Hot/Cold sensation:  normal  Achilles reflex:  2+     MUSCULOSKELETAL      Right Foot Musculoskeletal   Arch:  Normal      Left Foot Musculoskeletal   Arch:  Normal     MUSCLE STRENGTH      Right Foot Muscle Strength   Normal strength    Foot dorsiflexion:  5  Foot plantar flexion:  5  Foot inversion:  5  Foot eversion:  5      Left Foot Muscle Strength   Normal strength    Foot dorsiflexion:  5  Foot plantar flexion:  5  Foot inversion:  5  Foot eversion:  5     DERMATOLOGIC       Right Foot Dermatologic   Skin  Right foot skin is intact.       Left Foot Dermatologic   Skin  Left foot skin is intact.      TESTS      Right Foot Tests   Anterior drawer: negative  Varus tilt: " negative      Left Foot Tests   Anterior drawer: negative  Varus tilt: negative     Right foot additional comments: 04/27/2023: Moderate equinus contracture with knee extended and flexed. Discomfort with palpation involving the plantar aspect of the second metatarsophalangeal joint region. Mild hammer digital deformity and mild bunion deformity with dorsal medial bony prominence.     06/01/2023  Continued discomfort involving the 2nd metatarsophalangeal joint of the right foot. Calf discomfort and significant equinus contracture with knee extended and flexed.     07/13/2023  Significant discomfort with palpation involving the 2nd metatarsophalangeal joint region. Mild progressive hammertoe deformity involving the right 2nd digit.     08/10/2023  Less discomfort with palpation involving the second metatarsophalangeal joint region.     12/07/2023  Continued discomfort with palpation involving the plantar aspect of the second metatarsophalangeal joint region. No significant swelling or inflammation.     02/12/2024  Continued discomfort involving the 2nd metatarsophalangeal joint region.     04/18/2024: Two incision sites are dry and stable with intact sutures in the musculoskeletal system. No evidence of dehiscence or infection. Mild swelling to the foot. No significant swelling. Rectus alignment of the digits is good.    5/2/2024: Incisions are well-healed.  Continued swelling involving the forefoot.  Moderate discomfort with palpation and soft tissue rigidity.  Rectus alignment of the digits.    05/30/2024: Continued improvement with soft tissue rigidity and mild to moderate swelling involving the forefoot in the musculoskeletal system. No significant discomfort with palpation.    Assessment & Plan   Diagnoses and all orders for this visit:    1. Right foot pain (Primary)  -     XR Foot 3+ View Right    2. Hammer toe of right foot    3. Plantar plate injury, right, sequela    4. Bunion, right    5. Metatarsalgia,  right foot      The patient is a 53-year-old female who presents for 2-month follow-up. The patient exhibits continued improvement with soft tissue rigidity and mild-to-moderate swelling involving the forefoot. The patient is advised to gradually transition from the boot to a regular shoe, albeit at a slower pace. She is permitted to experiment with Mims, Hoka, or New Balance shoes. She is encouraged to perform deep tissue massage The patient is scheduled for a follow-up visit in 6 weeks, or earlier if necessary.      Orders Placed This Encounter   Procedures    XR Foot 3+ View Right     Scheduling Instructions:      4/5/2024       Open bunionectomy with distal metatarsal osteotomy and internal fixation, right foot      2.  Second metatarsal Weil osteotomy and fixation, right      3.  Hammer digit repair with implant, right second toe      4.  Open management of plantar plate injury, right second metatarsal phalangeal joint     Order Specific Question:   Reason for Exam:     Answer:   Open bunionectomy with distal metatarsal osteotomy and internal fixation, right foot. Second metatarsal Weil osteotomy and fixation, right  Hammer digit repair with implant, right second toe  room 14  wb     Order Specific Question:   Does this patient have a diabetic monitoring/medication delivering device on?     Answer:   No     Order Specific Question:   Release to patient     Answer:   Routine Release [[id number]]          Note is dictated utilizing voice recognition software. Unfortunately this leads to occasional typographical errors. I apologize in advance if the situation occurs. If questions occur please do not hesitate to call our office.    Transcribed from ambient dictation for HERMES Corbin DPM by Malini Dorado.  05/30/24   15:53 EDT    Patient or patient representative verbalized consent to the visit recording.  I have personally performed the services described in this document as transcribed by the above  individual, and it is both accurate and complete.

## 2024-07-10 ENCOUNTER — OFFICE VISIT (OUTPATIENT)
Dept: PODIATRY | Facility: CLINIC | Age: 54
End: 2024-07-10
Payer: COMMERCIAL

## 2024-07-10 VITALS — WEIGHT: 193 LBS | BODY MASS INDEX: 35.51 KG/M2 | HEART RATE: 89 BPM | HEIGHT: 62 IN | OXYGEN SATURATION: 99 %

## 2024-07-10 DIAGNOSIS — M20.41 HAMMER TOE OF RIGHT FOOT: ICD-10-CM

## 2024-07-10 DIAGNOSIS — M21.611 BUNION, RIGHT: ICD-10-CM

## 2024-07-10 DIAGNOSIS — S99.921S PLANTAR PLATE INJURY, RIGHT, SEQUELA: ICD-10-CM

## 2024-07-10 DIAGNOSIS — M79.671 RIGHT FOOT PAIN: Primary | ICD-10-CM

## 2024-07-11 NOTE — PROGRESS NOTES
07/10/2024  Foot and Ankle Surgery - Established Patient/Follow-up  Provider: Dr. Leon Corbin DPM  Location: [facility]    Subjective:  Itzel Hogan is a 53 y.o. female.     Chief Complaint   Patient presents with    Right Foot - Follow-up     PCP-MER Rodas 3/26/24       History of Present Illness  The patient presents for evaluation of multiple medical concerns.    The patient is 3 months post-surgery, with notable improvements. She has ceased use of the prescribed boot and has resumed her regular footwear. She reports no current pain, however, she expresses concern over residual scar tissue. She describes a sensation akin to a pulled muscle after prolonged periods of standing.      Allergies   Allergen Reactions    Penicillins Hives    Prochlorperazine Dizziness    Stadol [Butorphanol] Dizziness    Lisinopril Swelling    Meat Extract Other (See Comments)     Alpha-gal positive    Zinc Gelatin [Zinc] Other (See Comments)     Alpha-gal positive    Cephalexin Hives       Current Outpatient Medications on File Prior to Visit   Medication Sig Dispense Refill    amLODIPine (NORVASC) 5 MG tablet Take 1 tablet by mouth Daily. 90 tablet 1    atorvastatin (LIPITOR) 10 MG tablet TAKE 1 TABLET DAILY 90 tablet 3    cetirizine (zyrTEC) 10 MG tablet Take 1 tablet by mouth Daily.      EPINEPHrine (EPIPEN) 0.3 MG/0.3ML solution auto-injector injection See Admin Instructions. for allergic reaction      fluticasone (Flonase Allergy Relief) 50 MCG/ACT nasal spray 1 spray into the nostril(s) as directed by provider Daily.      metoprolol succinate XL (TOPROL-XL) 25 MG 24 hr tablet Take 1 tablet by mouth Every Morning.      omeprazole OTC (PriLOSEC OTC) 20 MG EC tablet Take 1 tablet by mouth Daily. 90 tablet 1    Probiotic Product (Probiotic Advanced) capsule Take 1 capsule by mouth Daily. Specific for vaginal health       No current facility-administered medications on file prior to visit.       Objective   Pulse 89   " Ht 157.5 cm (62\")   Wt 87.5 kg (193 lb)   SpO2 99%   BMI 35.30 kg/m²     Foot/Ankle Exam  Physical Exam    GENERAL  Orientation:  AAOx3  Affect:  appropriate     VASCULAR      Right Foot Vascularity   Normal vascular exam    Dorsalis pedis:  2+  Posterior tibial:  2+  Skin temperature:  warm  Edema grading:  None  CFT:  < 3 seconds  Pedal hair growth:  Present  Varicosities:  none      Left Foot Vascularity   Normal vascular exam    Dorsalis pedis:  2+  Posterior tibial:  2+  Skin temperature:  warm  Edema grading:  None  CFT:  < 3 seconds  Pedal hair growth:  Present  Varicosities:  none     NEUROLOGIC      Right Foot Neurologic   Light touch sensation: normal  Hot/Cold sensation: normal  Achilles reflex:  2+      Left Foot Neurologic   Light touch sensation: normal  Hot/Cold sensation:  normal  Achilles reflex:  2+     MUSCULOSKELETAL      Right Foot Musculoskeletal   Arch:  Normal      Left Foot Musculoskeletal   Arch:  Normal     MUSCLE STRENGTH      Right Foot Muscle Strength   Normal strength    Foot dorsiflexion:  5  Foot plantar flexion:  5  Foot inversion:  5  Foot eversion:  5      Left Foot Muscle Strength   Normal strength    Foot dorsiflexion:  5  Foot plantar flexion:  5  Foot inversion:  5  Foot eversion:  5     DERMATOLOGIC       Right Foot Dermatologic   Skin  Right foot skin is intact.       Left Foot Dermatologic   Skin  Left foot skin is intact.      TESTS      Right Foot Tests   Anterior drawer: negative  Varus tilt: negative      Left Foot Tests   Anterior drawer: negative  Varus tilt: negative     Right foot additional comments: 04/27/2023: Moderate equinus contracture with knee extended and flexed. Discomfort with palpation involving the plantar aspect of the second metatarsophalangeal joint region. Mild hammer digital deformity and mild bunion deformity with dorsal medial bony prominence.     06/01/2023  Continued discomfort involving the 2nd metatarsophalangeal joint of the right foot. " Calf discomfort and significant equinus contracture with knee extended and flexed.     07/13/2023  Significant discomfort with palpation involving the 2nd metatarsophalangeal joint region. Mild progressive hammertoe deformity involving the right 2nd digit.     08/10/2023  Less discomfort with palpation involving the second metatarsophalangeal joint region.     12/07/2023  Continued discomfort with palpation involving the plantar aspect of the second metatarsophalangeal joint region. No significant swelling or inflammation.     02/12/2024  Continued discomfort involving the 2nd metatarsophalangeal joint region.     04/18/2024: Two incision sites are dry and stable with intact sutures in the musculoskeletal system. No evidence of dehiscence or infection. Mild swelling to the foot. No significant swelling. Rectus alignment of the digits is good.     5/2/2024: Incisions are well-healed.  Continued swelling involving the forefoot.  Moderate discomfort with palpation and soft tissue rigidity.  Rectus alignment of the digits.     05/30/2024: Continued improvement with soft tissue rigidity and mild to moderate swelling involving the forefoot in the musculoskeletal system. No significant discomfort with palpation.    7/10/24: Less swelling involving the forefoot.  Continued soft tissue rigidity.  Rectus alignment of the digits.  Mild dorsiflexion noted to the second metatarsal phalangeal joint region.    Results      Assessment & Plan   Diagnoses and all orders for this visit:    1. Right foot pain (Primary)    2. Hammer toe of right foot    3. Plantar plate injury, right, sequela    4. Bunion, right      Assessment & Plan    Patient returns and states that she is doing much improved as compared to preoperative assessment.  She has noticed improvement since last evaluation.  She continues to have stiffness and tightness.  She has been performing normal daily activities without any significant pain or limitation.  Overall, I  feel that she is doing quite well at this time.  I have discussed the importance of continued support and proper stretching and manual therapy exercises.  I do feel that symptoms will continue to dissipate as she normalizes activity.  Patient is to monitor closely and call with any progressive issues or concerns otherwise I will see her on an as-needed basis.Greater than 20 minutes was spent before, during, and after evaluation for patient care.             Patient or patient representative verbalized consent for the use of Ambient Listening during the visit with  HERMES Corbin DPM for chart documentation. 7/11/2024  07:24 EDT    HERMES Corbin DPM

## 2024-09-09 NOTE — PROGRESS NOTES
Encounter Date:09/12/2024  Last seen 2/15/2024      Patient ID: Itzel Hogan is a 53 y.o. female.      Chief Complaint:     Palpitations  Hypertension  Dyslipidemia  Shortness of breath        History of Present Illness  Since I have last seen, the patient has been without any chest discomfort ,shortness of breath, palpitations, dizziness or syncope.  Denies having any headache ,abdominal pain ,nausea, vomiting , diarrhea constipation, loss of weight or loss of appetite.  Denies having any excessive bruising ,hematuria or blood in the stool.    Review of all systems negative except as indicated.    Reviewed ROS..    Assessment and Plan      ]]]]]]]]]]]]]]]]]  History  ========  -Palpitations-exertional shortness of breath-improved     Echocardiogram-normal 2/11/2020  Stress Cardiolite test-normal 2/11/2020     -Hypertension dyslipidemia migraine headaches recent cholesterol 233  (March 2020)     -Status post tonsillectomy sinus surgery carpal tunnel release     -Family history of coronary artery disease.     -Non-smoker     -Allergic to penicillin cephalexin prochlorperazine  ===========  Plan  ========  Palpitations and shortness of breath-improved  EKG showed sinus rhythm without any ischemic changes-1/3/2024     Hypertension-well-controlled  112/79  Patient is off off hydrochlorothiazide.  Continue metoprolol XL 25 mg a day.  Continue Norvasc to 5 mg a day     Dyslipidemia-diet controlled     Medications were reviewed and updated.     Patient is not having any angina pectoris or congestive heart failure     Follow-up in the office in 6 months..     Further plan will depend on patient's progress.     Reviewed and updated 9/12/2024  [[[[[[[[[[[[[[[[[           Diagnosis Plan   1. Palpitations        2. Essential hypertension        3. Dyslipidemia        4. SOB (shortness of breath)        LAB RESULTS (LAST 7 DAYS)    CBC        BMP        CMP         BNP        TROPONIN        CoAg        Creatinine  Clearance  CrCl cannot be calculated (Patient's most recent lab result is older than the maximum 30 days allowed.).    ABG        Radiology  No radiology results for the last day                The following portions of the patient's history were reviewed and updated as appropriate: allergies, current medications, past family history, past medical history, past social history, past surgical history, and problem list.    ROS      Current Outpatient Medications:     amLODIPine (NORVASC) 5 MG tablet, Take 1 tablet by mouth Daily., Disp: 90 tablet, Rfl: 1    atorvastatin (LIPITOR) 10 MG tablet, TAKE 1 TABLET DAILY, Disp: 90 tablet, Rfl: 3    cetirizine (zyrTEC) 10 MG tablet, Take 1 tablet by mouth Daily., Disp: , Rfl:     EPINEPHrine (EPIPEN) 0.3 MG/0.3ML solution auto-injector injection, See Admin Instructions. for allergic reaction, Disp: , Rfl:     fluticasone (Flonase Allergy Relief) 50 MCG/ACT nasal spray, 1 spray into the nostril(s) as directed by provider Daily., Disp: , Rfl:     metoprolol succinate XL (TOPROL-XL) 25 MG 24 hr tablet, Take 1 tablet by mouth Every Morning., Disp: , Rfl:     omeprazole OTC (PriLOSEC OTC) 20 MG EC tablet, Take 1 tablet by mouth Daily., Disp: 90 tablet, Rfl: 1    Probiotic Product (Probiotic Advanced) capsule, Take 1 capsule by mouth Daily. Specific for vaginal health, Disp: , Rfl:     Allergies   Allergen Reactions    Penicillins Hives    Prochlorperazine Dizziness    Stadol [Butorphanol] Dizziness    Lisinopril Swelling    Meat Extract Other (See Comments)     Alpha-gal positive    Zinc Gelatin [Zinc] Other (See Comments)     Alpha-gal positive    Cephalexin Hives       Family History   Problem Relation Age of Onset    Stroke Mother     Heart failure Mother     Cancer Mother     Asthma Mother     Hyperlipidemia Mother     Hypertension Mother     Skin cancer Father     Anemia Father     Asthma Sister     Heart attack Maternal Grandmother     Heart attack Maternal Grandfather         Past Surgical History:   Procedure Laterality Date    BUNIONECTOMY Right 04/05/2024    Procedure: BUNIONECTOMY WITH DISTAL METATARSAL OSTEOTOMY AND INTERNAL FIXATION;  Surgeon: HERMES Corbin DPM;  Location: [facility] MA[facility] OR;  Service: Podiatry;  Laterality: Right;    BUNIONECTOMY  4/5/2024    CARPAL TUNNEL RELEASE Bilateral     COLONOSCOPY      ENDOSCOPY      FOOT SURGERY  4/5/24    HAMMER TOE REPAIR Right 04/05/2024    Procedure: HAMMER DIGIT CORRECTION TO SECOND RAY;  Surgeon: HERMES Corbin DPM;  Location: [facility] MAIN OR;  Service: Podiatry;  Laterality: Right;    METATARSAL OSTEOTOMY Right 04/05/2024    Procedure: WEIL METATARSAL OSTEOTOMY, PLANTAR PLATE REPAIR;  Surgeon: HERMES Corbin DPM;  Location: [facility] MAIN OR;  Service: Podiatry;  Laterality: Right;    SINUS SURGERY      TONSILLECTOMY      WISDOM TOOTH EXTRACTION         Past Medical History:   Diagnosis Date    Allergic     Allergic reaction to alpha-gal     Allergies     Anemia     B12 deficiency     Bunion     Difficulty walking     Diverticulitis     Diverticulosis April 1 , 2023    Umu tompkins     GERD (gastroesophageal reflux disease)     Hammer toe     Hyperlipidemia     Hypertension     Migraine     Seasonal allergies        Family History   Problem Relation Age of Onset    Stroke Mother     Heart failure Mother     Cancer Mother     Asthma Mother     Hyperlipidemia Mother     Hypertension Mother     Skin cancer Father     Anemia Father     Asthma Sister     Heart attack Maternal Grandmother     Heart attack Maternal Grandfather        Social History     Socioeconomic History    Marital status:    Tobacco Use    Smoking status: Never     Passive exposure: Never    Smokeless tobacco: Never   Vaping Use    Vaping status: Never Used   Substance and Sexual Activity    Alcohol use: Not Currently     Comment: rare    Drug use: Never    Sexual activity: Yes     Partners: Male     Birth control/protection: Condom          Procedures      Objective:       Physical Exam    There were no vitals taken for this visit.  The patient is alert, oriented and in no distress.    Vital signs as noted above.    Head and neck revealed no carotid bruits or jugular venous distension.  No thyromegaly or lymphadenopathy is present.    Lungs clear.  No wheezing.  Breath sounds are normal bilaterally.    Heart normal first and second heart sounds.  No murmur..  No pericardial rub is present.  No gallop is present.    Abdomen soft and nontender.  No organomegaly is present.    Extremities revealed good peripheral pulses without any pedal edema.    Skin warm and dry.    Musculoskeletal system is grossly normal.    CNS grossly normal.    Reviewed and updated.

## 2024-09-12 ENCOUNTER — OFFICE VISIT (OUTPATIENT)
Dept: CARDIOLOGY | Facility: CLINIC | Age: 54
End: 2024-09-12
Payer: COMMERCIAL

## 2024-09-12 VITALS
BODY MASS INDEX: 34.82 KG/M2 | SYSTOLIC BLOOD PRESSURE: 112 MMHG | WEIGHT: 189.25 LBS | DIASTOLIC BLOOD PRESSURE: 79 MMHG | OXYGEN SATURATION: 97 % | HEART RATE: 83 BPM | HEIGHT: 62 IN

## 2024-09-12 DIAGNOSIS — I10 ESSENTIAL HYPERTENSION: ICD-10-CM

## 2024-09-12 DIAGNOSIS — R06.02 SOB (SHORTNESS OF BREATH): ICD-10-CM

## 2024-09-12 DIAGNOSIS — R00.2 PALPITATIONS: Primary | ICD-10-CM

## 2024-09-12 DIAGNOSIS — E78.5 DYSLIPIDEMIA: ICD-10-CM

## 2024-09-12 PROCEDURE — 99214 OFFICE O/P EST MOD 30 MIN: CPT | Performed by: INTERNAL MEDICINE

## 2024-09-12 RX ORDER — METOPROLOL SUCCINATE 25 MG/1
25 TABLET, EXTENDED RELEASE ORAL EVERY MORNING
Qty: 90 TABLET | Refills: 1 | Status: SHIPPED | OUTPATIENT
Start: 2024-09-12

## 2024-09-12 RX ORDER — AMLODIPINE BESYLATE 5 MG/1
5 TABLET ORAL DAILY
Qty: 90 TABLET | Refills: 1 | Status: SHIPPED | OUTPATIENT
Start: 2024-09-12

## 2024-09-26 DIAGNOSIS — K57.92 DIVERTICULITIS: Primary | ICD-10-CM

## 2024-09-26 RX ORDER — METRONIDAZOLE 500 MG/1
500 TABLET ORAL 2 TIMES DAILY
Qty: 14 TABLET | Refills: 0 | Status: SHIPPED | OUTPATIENT
Start: 2024-09-26 | End: 2024-10-03

## 2024-09-26 RX ORDER — CIPROFLOXACIN 500 MG/1
500 TABLET, FILM COATED ORAL 2 TIMES DAILY
Qty: 14 TABLET | Refills: 0 | Status: SHIPPED | OUTPATIENT
Start: 2024-09-26 | End: 2024-10-03

## 2024-09-30 DIAGNOSIS — I10 ESSENTIAL HYPERTENSION: ICD-10-CM

## 2024-09-30 DIAGNOSIS — E78.2 MIXED HYPERLIPIDEMIA: ICD-10-CM

## 2024-09-30 RX ORDER — AMLODIPINE BESYLATE 5 MG/1
5 TABLET ORAL DAILY
Qty: 90 TABLET | Refills: 1 | Status: SHIPPED | OUTPATIENT
Start: 2024-09-30

## 2024-09-30 RX ORDER — ATORVASTATIN CALCIUM 10 MG/1
10 TABLET, FILM COATED ORAL DAILY
Qty: 90 TABLET | Refills: 3 | Status: SHIPPED | OUTPATIENT
Start: 2024-09-30

## 2024-09-30 RX ORDER — METOPROLOL SUCCINATE 25 MG/1
25 TABLET, EXTENDED RELEASE ORAL EVERY MORNING
Qty: 90 TABLET | Refills: 3 | Status: SHIPPED | OUTPATIENT
Start: 2024-09-30

## 2024-09-30 NOTE — PROGRESS NOTES
Subjective   Itzel Hogan is a 53 y.o. female.       HPI   Pt is here today for follow up.   1)  HTN - currently on amlodipine 5 mg daily; metoprolol XL 25 mg daily. BP stable. Denies any CP; palpitations; SOA; dizziness; headache; trouble with vision.   2) Hyperlipidemia - currently on atorvastatin 10 mg daily.    3) GERD - currently on omeprazole 20 mg daily.   4) Right elbow - fell a month ago and landed on elbow.  It was initially bruised but has remained tender to touch; she also has noticed decreased  strength on the right.  No swelling or redness.      The following portions of the patient's history were reviewed and updated as appropriate: allergies, current medications, past family history, past medical history, past social history, past surgical history, and problem list.    Review of Systems   Constitutional:  Negative for chills, fatigue and fever.   Respiratory:  Negative for cough, shortness of breath and wheezing.    Cardiovascular:  Negative for chest pain and palpitations.   Gastrointestinal:  Negative for blood in stool, diarrhea, nausea, vomiting and indigestion.   Genitourinary:  Negative for dysuria, frequency, hematuria and urgency.   Musculoskeletal:  Positive for arthralgias.   Neurological:  Negative for dizziness, weakness, light-headedness and headache.   Psychiatric/Behavioral:  Negative for depressed mood. The patient is not nervous/anxious.        Objective   Physical Exam  Vitals reviewed.   Constitutional:       General: She is not in acute distress.     Appearance: Normal appearance. She is obese.   Cardiovascular:      Rate and Rhythm: Normal rate and regular rhythm.      Pulses: Normal pulses.      Heart sounds: Normal heart sounds. No murmur heard.  Pulmonary:      Effort: Pulmonary effort is normal. No respiratory distress.      Breath sounds: Normal breath sounds. No wheezing or rhonchi.   Chest:      Chest wall: No tenderness.   Abdominal:      General: Bowel sounds are  normal. There is no distension.      Palpations: Abdomen is soft.      Tenderness: There is no abdominal tenderness. There is no right CVA tenderness, left CVA tenderness, guarding or rebound.   Musculoskeletal:      Right elbow: No swelling. Tenderness present in lateral epicondyle and olecranon process.   Skin:     General: Skin is warm and dry.   Neurological:      General: No focal deficit present.      Mental Status: She is alert and oriented to person, place, and time.   Psychiatric:         Mood and Affect: Mood normal.         BMI is >= 30 and <35. (Class 1 Obesity). The following options were offered after discussion;: exercise counseling/recommendations and nutrition counseling/recommendations       Procedures   Assessment & Plan   Diagnoses and all orders for this visit:    1. Essential hypertension (Primary)  Comments:  Stable.   Cont. current medication.   Labs ordered.   RTO in 6 mo.  Orders:  -     Comprehensive metabolic panel; Future  -     Lipid panel; Future    2. Mixed hyperlipidemia  Comments:  Stable.   Cont. current medication.   Labs ordered.   RTO in 6 mo.  Orders:  -     Comprehensive metabolic panel; Future  -     Lipid panel; Future    3. Gastroesophageal reflux disease, unspecified whether esophagitis present  Comments:  Stable.   Cont. current medication.   Labs ordered.   RTO in 6 mo.    4. Immunization due  Comments:  Flu vaccine today  Orders:  -     Fluzone >6mos    5. Right elbow pain  Comments:  Xray ordered.   Heat/ice and rest  Orders:  -     XR Elbow 2 View Right; Future    6. Accidental fall, sequela  Comments:  Xray ordered.   Heat/ice and rest  Orders:  -     XR Elbow 2 View Right; Future    7. Class 1 obesity due to excess calories with serious comorbidity and body mass index (BMI) of 34.0 to 34.9 in adult  Comments:  Work on a healthy diet; aim for 150 min exercise weekly.

## 2024-09-30 NOTE — TELEPHONE ENCOUNTER
Rx Refill Note  Requested Prescriptions     Signed Prescriptions Disp Refills    metoprolol succinate XL (TOPROL-XL) 25 MG 24 hr tablet 90 tablet 3     Sig: Take 1 tablet by mouth Every Morning.     Authorizing Provider: TERESA ZACARIAS     Ordering User: NUZHAT MELO      Last office visit with prescribing clinician: 9/12/2024   Last telemedicine visit with prescribing clinician: Visit date not found   Next office visit with prescribing clinician: 3/13/2025                         Would you like a call back once the refill request has been completed: [] Yes [] No    If the office needs to give you a call back, can they leave a voicemail: [] Yes [] No    Nuzhat Melo MA  09/30/24, 14:28 EDT

## 2024-10-02 ENCOUNTER — LAB (OUTPATIENT)
Dept: FAMILY MEDICINE CLINIC | Facility: CLINIC | Age: 54
End: 2024-10-02
Payer: COMMERCIAL

## 2024-10-02 ENCOUNTER — OFFICE VISIT (OUTPATIENT)
Dept: FAMILY MEDICINE CLINIC | Facility: CLINIC | Age: 54
End: 2024-10-02
Payer: COMMERCIAL

## 2024-10-02 VITALS
OXYGEN SATURATION: 99 % | HEIGHT: 62 IN | DIASTOLIC BLOOD PRESSURE: 86 MMHG | WEIGHT: 187 LBS | HEART RATE: 70 BPM | BODY MASS INDEX: 34.41 KG/M2 | SYSTOLIC BLOOD PRESSURE: 123 MMHG

## 2024-10-02 DIAGNOSIS — Z23 IMMUNIZATION DUE: ICD-10-CM

## 2024-10-02 DIAGNOSIS — M25.521 RIGHT ELBOW PAIN: ICD-10-CM

## 2024-10-02 DIAGNOSIS — E66.09 CLASS 1 OBESITY DUE TO EXCESS CALORIES WITH SERIOUS COMORBIDITY AND BODY MASS INDEX (BMI) OF 34.0 TO 34.9 IN ADULT: ICD-10-CM

## 2024-10-02 DIAGNOSIS — E78.2 MIXED HYPERLIPIDEMIA: ICD-10-CM

## 2024-10-02 DIAGNOSIS — E66.811 CLASS 1 OBESITY DUE TO EXCESS CALORIES WITH SERIOUS COMORBIDITY AND BODY MASS INDEX (BMI) OF 34.0 TO 34.9 IN ADULT: ICD-10-CM

## 2024-10-02 DIAGNOSIS — I10 ESSENTIAL HYPERTENSION: Primary | ICD-10-CM

## 2024-10-02 DIAGNOSIS — K21.9 GASTROESOPHAGEAL REFLUX DISEASE, UNSPECIFIED WHETHER ESOPHAGITIS PRESENT: ICD-10-CM

## 2024-10-02 DIAGNOSIS — I10 ESSENTIAL HYPERTENSION: ICD-10-CM
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
## 2024-10-02 LAB
ALBUMIN SERPL-MCNC: 4.3 G/DL (ref 3.5–5.2)
ALBUMIN/GLOB SERPL: 1.5 G/DL
ALP SERPL-CCNC: 123 U/L (ref 39–117)
ALT SERPL W P-5'-P-CCNC: 30 U/L (ref 1–33)
ANION GAP SERPL CALCULATED.3IONS-SCNC: 11.7 MMOL/L (ref 5–15)
AST SERPL-CCNC: 24 U/L (ref 1–32)
BILIRUB SERPL-MCNC: 0.3 MG/DL (ref 0–1.2)
BUN SERPL-MCNC: 13 MG/DL (ref 6–20)
BUN/CREAT SERPL: 16 (ref 7–25)
CALCIUM SPEC-SCNC: 9.6 MG/DL (ref 8.6–10.5)
CHLORIDE SERPL-SCNC: 104 MMOL/L (ref 98–107)
CHOLEST SERPL-MCNC: 160 MG/DL (ref 0–200)
CO2 SERPL-SCNC: 26.3 MMOL/L (ref 22–29)
CREAT SERPL-MCNC: 0.81 MG/DL (ref 0.57–1)
EGFRCR SERPLBLD CKD-EPI 2021: 87.5 ML/MIN/1.73
GLOBULIN UR ELPH-MCNC: 2.8 GM/DL
GLUCOSE SERPL-MCNC: 93 MG/DL (ref 65–99)
HDLC SERPL-MCNC: 56 MG/DL (ref 40–60)
LDLC SERPL CALC-MCNC: 86 MG/DL (ref 0–100)
LDLC/HDLC SERPL: 1.51 {RATIO}
POTASSIUM SERPL-SCNC: 4.3 MMOL/L (ref 3.5–5.2)
PROT SERPL-MCNC: 7.1 G/DL (ref 6–8.5)
SODIUM SERPL-SCNC: 142 MMOL/L (ref 136–145)
TRIGL SERPL-MCNC: 98 MG/DL (ref 0–150)
VLDLC SERPL-MCNC: 18 MG/DL (ref 5–40)

## 2024-10-02 PROCEDURE — 90471 IMMUNIZATION ADMIN: CPT | Performed by: NURSE PRACTITIONER

## 2024-10-02 PROCEDURE — 99214 OFFICE O/P EST MOD 30 MIN: CPT | Performed by: NURSE PRACTITIONER

## 2024-10-02 PROCEDURE — 36415 COLL VENOUS BLD VENIPUNCTURE: CPT

## 2024-10-02 PROCEDURE — 80053 COMPREHEN METABOLIC PANEL: CPT | Performed by: NURSE PRACTITIONER

## 2024-10-02 PROCEDURE — 80061 LIPID PANEL: CPT | Performed by: NURSE PRACTITIONER

## 2024-10-02 PROCEDURE — 90656 IIV3 VACC NO PRSV 0.5 ML IM: CPT | Performed by: NURSE PRACTITIONER

## 2024-11-08 DIAGNOSIS — K21.9 GASTROESOPHAGEAL REFLUX DISEASE, UNSPECIFIED WHETHER ESOPHAGITIS PRESENT: ICD-10-CM

## 2024-11-11 RX ORDER — OMEPRAZOLE 20 MG/1
20 TABLET, DELAYED RELEASE ORAL DAILY
Qty: 90 TABLET | Refills: 1 | Status: SHIPPED | OUTPATIENT
Start: 2024-11-11

## 2024-11-18 ENCOUNTER — TELEPHONE (OUTPATIENT)
Dept: PODIATRY | Facility: CLINIC | Age: 54
End: 2024-11-18
Payer: COMMERCIAL

## 2024-11-18 NOTE — TELEPHONE ENCOUNTER
"Caller: Itzel Hogan \"Erika\"    Relationship to patient: Self    Best call back number: [phone]    Type of visit: R FOOT INJECTION    Requested date: ASAP     Additional notes: PT STATES DR CABALLERO ADVISED PT TO CALL BACK IF R FOOT WAS STILL HAVING SOME ISSUES AFTER HAVING SX EARLIER THIS YEAR. PT STATES SHE WOULD LIKE TO GO AHEAD AND GET AN INJECTION IN R FOOT. PLEASE CONTACT PT TO SCHEDULE.        "

## 2024-11-21 ENCOUNTER — OFFICE VISIT (OUTPATIENT)
Age: 54
End: 2024-11-21
Payer: COMMERCIAL

## 2024-11-21 VITALS — HEIGHT: 62 IN | WEIGHT: 187 LBS | HEART RATE: 96 BPM | BODY MASS INDEX: 34.41 KG/M2 | OXYGEN SATURATION: 97 %

## 2024-11-21 DIAGNOSIS — L90.5 SCAR TISSUE: ICD-10-CM

## 2024-11-21 DIAGNOSIS — M79.671 RIGHT FOOT PAIN: Primary | ICD-10-CM

## 2024-11-21 DIAGNOSIS — M77.41 METATARSALGIA, RIGHT FOOT: ICD-10-CM

## 2024-11-21 DIAGNOSIS — S99.921S PLANTAR PLATE INJURY, RIGHT, SEQUELA: ICD-10-CM

## 2024-11-21 RX ORDER — TRIAMCINOLONE ACETONIDE 40 MG/ML
20 INJECTION, SUSPENSION INTRA-ARTICULAR; INTRAMUSCULAR ONCE
Status: COMPLETED | OUTPATIENT
Start: 2024-11-21 | End: 2024-11-21

## 2024-11-21 RX ADMIN — TRIAMCINOLONE ACETONIDE 20 MG: 40 INJECTION, SUSPENSION INTRA-ARTICULAR; INTRAMUSCULAR at 11:38

## 2024-11-22 NOTE — PROGRESS NOTES
11/21/2024  Foot and Ankle Surgery - Established Patient/Follow-up  Provider: Dr. Leon Corbin DPM  Location: [facility]    Subjective:  Itzel Hogan is a 53 y.o. female.     Chief Complaint   Patient presents with    Right Foot - Follow-up    Follow-up     PCP- 10/2/24       History of Present Illness  The patient presents for evaluation of her big toe.    She reports experiencing tightness in her big toe and second toe, accompanied by occasional pain and some swelling. She also mentions a persistent feeling of something being present at the bottom of her foot.     She recalls an incident a few weeks ago where she stepped on her toe while moving, which resulted in pain and a bruise the following morning.     Despite these symptoms, she continues to work as best as she can. She has not had a repeat x-ray today.     She has a screw in her toe and experiences difficulty bending her toes while walking.      Allergies   Allergen Reactions    Penicillins Hives    Prochlorperazine Dizziness    Stadol [Butorphanol] Dizziness    Lisinopril Swelling    Meat Extract Other (See Comments)     Alpha-gal positive    Zinc Gelatin [Zinc] Other (See Comments)     Alpha-gal positive    Cephalexin Hives       Current Outpatient Medications on File Prior to Visit   Medication Sig Dispense Refill    amLODIPine (NORVASC) 5 MG tablet Take 1 tablet by mouth Daily. 90 tablet 1    atorvastatin (LIPITOR) 10 MG tablet Take 1 tablet by mouth Daily. 90 tablet 3    cetirizine (zyrTEC) 10 MG tablet Take 1 tablet by mouth Daily.      EPINEPHrine (EPIPEN) 0.3 MG/0.3ML solution auto-injector injection See Admin Instructions. for allergic reaction      fluticasone (Flonase Allergy Relief) 50 MCG/ACT nasal spray Administer 1 spray into the nostril(s) as directed by provider Daily.      metoprolol succinate XL (TOPROL-XL) 25 MG 24 hr tablet Take 1 tablet by mouth Every Morning. 90 tablet 3    omeprazole OTC (PriLOSEC OTC) 20 MG EC  "tablet Take 1 tablet by mouth Daily. 90 tablet 1    Probiotic Product (Probiotic Advanced) capsule Take 1 capsule by mouth Daily. Specific for vaginal health       No current facility-administered medications on file prior to visit.       Objective   Pulse 96   Ht 157.5 cm (62\")   Wt 84.8 kg (187 lb)   SpO2 97%   BMI 34.20 kg/m²     Foot/Ankle Exam  Physical Exam  GENERAL  Orientation:  AAOx3  Affect:  appropriate     VASCULAR      Right Foot Vascularity   Normal vascular exam    Dorsalis pedis:  2+  Posterior tibial:  2+  Skin temperature:  warm  Edema grading:  None  CFT:  < 3 seconds  Pedal hair growth:  Present  Varicosities:  none      Left Foot Vascularity   Normal vascular exam    Dorsalis pedis:  2+  Posterior tibial:  2+  Skin temperature:  warm  Edema grading:  None  CFT:  < 3 seconds  Pedal hair growth:  Present  Varicosities:  none     NEUROLOGIC      Right Foot Neurologic   Light touch sensation: normal  Hot/Cold sensation: normal  Achilles reflex:  2+      Left Foot Neurologic   Light touch sensation: normal  Hot/Cold sensation:  normal  Achilles reflex:  2+     MUSCULOSKELETAL      Right Foot Musculoskeletal   Arch:  Normal      Left Foot Musculoskeletal   Arch:  Normal     MUSCLE STRENGTH      Right Foot Muscle Strength   Normal strength    Foot dorsiflexion:  5  Foot plantar flexion:  5  Foot inversion:  5  Foot eversion:  5      Left Foot Muscle Strength   Normal strength    Foot dorsiflexion:  5  Foot plantar flexion:  5  Foot inversion:  5  Foot eversion:  5     DERMATOLOGIC       Right Foot Dermatologic   Skin  Right foot skin is intact.       Left Foot Dermatologic   Skin  Left foot skin is intact.      TESTS      Right Foot Tests   Anterior drawer: negative  Varus tilt: negative      Left Foot Tests   Anterior drawer: negative  Varus tilt: negative     Right foot additional comments: 04/27/2023: Moderate equinus contracture with knee extended and flexed. Discomfort with palpation involving " the plantar aspect of the second metatarsophalangeal joint region. Mild hammer digital deformity and mild bunion deformity with dorsal medial bony prominence.     06/01/2023  Continued discomfort involving the 2nd metatarsophalangeal joint of the right foot. Calf discomfort and significant equinus contracture with knee extended and flexed.     07/13/2023  Significant discomfort with palpation involving the 2nd metatarsophalangeal joint region. Mild progressive hammertoe deformity involving the right 2nd digit.     08/10/2023  Less discomfort with palpation involving the second metatarsophalangeal joint region.     12/07/2023  Continued discomfort with palpation involving the plantar aspect of the second metatarsophalangeal joint region. No significant swelling or inflammation.     02/12/2024  Continued discomfort involving the 2nd metatarsophalangeal joint region.     04/18/2024: Two incision sites are dry and stable with intact sutures in the musculoskeletal system. No evidence of dehiscence or infection. Mild swelling to the foot. No significant swelling. Rectus alignment of the digits is good.     5/2/2024: Incisions are well-healed.  Continued swelling involving the forefoot.  Moderate discomfort with palpation and soft tissue rigidity.  Rectus alignment of the digits.     05/30/2024: Continued improvement with soft tissue rigidity and mild to moderate swelling involving the forefoot in the musculoskeletal system. No significant discomfort with palpation.     7/10/24: Less swelling involving the forefoot.  Continued soft tissue rigidity.  Rectus alignment of the digits.  Mild dorsiflexion noted to the second metatarsal phalangeal joint region.    11/21/24: Significant improvement as compared to previous assessment but continued limitation involving the second metatarsal phalangeal joint region.  Scar tissue formation noted to the dorsal medial aspect of the joint region.  Discomfort with palpation.  Rectus  alignment of the digits.  No significant swelling.  Neurovascular status is grossly intact      Results      Assessment & Plan   Diagnoses and all orders for this visit:    1. Right foot pain (Primary)    2. Metatarsalgia, right foot  -     triamcinolone acetonide (KENALOG-40) injection 20 mg    3. Scar tissue    4. Plantar plate injury, right, sequela      Assessment & Plan    She presents with mild arthritis in the big toe joint, characterized by a slightly narrowed joint space and minor spurring on the sides. The stiffness she experiences is likely due to scar tissue, which can be alleviated by removing some of it to restore function. A steroid injection was administered today to provide relief. If necessary, another injection can be given in 3 months. If she continues to have problems isolated to the big toe area, further options such as fusing the joint may be considered.    She experienced a bruise on her toe after stepping down on it during a move a couple of weeks ago. This is likely a contusion due to the pressure applied to the area. It is expected to improve over the next few weeks.Reviewed proper basic stretching and manual therapy exercises along with appropriate shoes and activity.  Discussed proper use and/or avoidance of OTC anti-inflammatories.  Patient is to call with any additional issues or concerns.  Greater than 20 minutes was spent before, during, and after evaluation for patient care.     Metatarsophalangeal Joint injection: Right second MPJ    Consent and time out was performed before proceeding with the procedure.  The dorsal medial aspect of the second metatarsal phalangeal joint on the right foot was cleansed with alcohol.  The skin about the injection site was initially anesthetized with approximately 1mL of 1% lidocaine plain.  A Betadine prep was performed to the joint. Due to the arthritic nature of the joint, ultrasound guidance was used to locate the joint space and avoid bony  prominences.  Using aseptic technique, a 1.5 mL solution containing 1 mL of 0.5% Marcaine and 0.5 mL of Kenalog was injected into the joint space.  Mild compression was applied to the injection site and a sterile bandage was placed.  The patient noted immediate pain relief and tolerated the injection well without complication.     Follow-up  Return in 3 months for follow up.             Patient or patient representative verbalized consent for the use of Ambient Listening during the visit with  HERMES Corbin DPM for chart documentation. 11/22/2024  08:11 EST    HERMES Corbin DPM

## 2024-11-24 DIAGNOSIS — E78.2 MIXED HYPERLIPIDEMIA: ICD-10-CM

## 2024-11-25 RX ORDER — ATORVASTATIN CALCIUM 10 MG/1
10 TABLET, FILM COATED ORAL DAILY
Qty: 90 TABLET | Refills: 3 | Status: SHIPPED | OUTPATIENT
Start: 2024-11-25

## 2024-12-09 NOTE — PROGRESS NOTES
Subjective   Itzel Hogan is a 53 y.o. female.       HPI   Pt is here today with concern of rash. Symptoms started over a week ago.  Rash is located on her inner thighs.  She reports itching.  She feels it has spread a little. Has used an otc anti-itch cream with little relief.    She denies any use of new soaps, lotions, detergents, etc.  She has been doing yard work recently; helping to clean up her in-laws house and may have gotten into something there.    No family members have rash.      The following portions of the patient's history were reviewed and updated as appropriate: allergies, current medications, past family history, past medical history, past social history, past surgical history, and problem list.    Review of Systems   Constitutional:  Negative for fatigue and fever.   HENT:  Negative for congestion, rhinorrhea, sore throat, swollen glands and trouble swallowing.    Respiratory:  Negative for cough, chest tightness and shortness of breath.    Gastrointestinal:  Negative for diarrhea and vomiting.   Skin:  Positive for rash.       Objective   Physical Exam  Vitals reviewed.   Constitutional:       General: She is not in acute distress.     Appearance: Normal appearance.   Cardiovascular:      Rate and Rhythm: Normal rate and regular rhythm.      Pulses: Normal pulses.   Pulmonary:      Effort: Pulmonary effort is normal. No respiratory distress.   Skin:     General: Skin is warm and dry.      Findings: Rash (fine papular rash with slight dryness/scale on both inner thighs) present.   Neurological:      General: No focal deficit present.      Mental Status: She is alert and oriented to person, place, and time.   Psychiatric:         Mood and Affect: Mood normal.                  Procedures   Assessment & Plan   Diagnoses and all orders for this visit:    1. Rash and other nonspecific skin eruption (Primary)  Comments:  Medrol pack given.    Cont. cetrizine daily.    Can use otc anti-itch cream as  needed.  Call if worsening.  Orders:  -     methylPREDNISolone (MEDROL) 4 MG dose pack; Take as directed on package instructions.  Dispense: 21 tablet; Refill: 0

## 2024-12-10 ENCOUNTER — OFFICE VISIT (OUTPATIENT)
Dept: FAMILY MEDICINE CLINIC | Facility: CLINIC | Age: 54
End: 2024-12-10
Payer: COMMERCIAL

## 2024-12-10 VITALS
HEIGHT: 62 IN | OXYGEN SATURATION: 95 % | SYSTOLIC BLOOD PRESSURE: 127 MMHG | TEMPERATURE: 98.7 F | WEIGHT: 186 LBS | BODY MASS INDEX: 34.23 KG/M2 | DIASTOLIC BLOOD PRESSURE: 84 MMHG | HEART RATE: 70 BPM

## 2024-12-10 DIAGNOSIS — R21 RASH AND OTHER NONSPECIFIC SKIN ERUPTION: Primary | ICD-10-CM

## 2024-12-10 PROCEDURE — 99213 OFFICE O/P EST LOW 20 MIN: CPT | Performed by: NURSE PRACTITIONER

## 2024-12-10 RX ORDER — METHYLPREDNISOLONE 4 MG/1
TABLET ORAL
Qty: 21 TABLET | Refills: 0 | Status: SHIPPED | OUTPATIENT
Start: 2024-12-10

## 2024-12-11 NOTE — OUTREACH NOTE
Call Center TCM Note    Flowsheet Row Responses   [facility] [place] [facility] patient discharged from? [facility]   Does the patient have one of the following disease processes/diagnoses(primary or secondary)? Other   TCM attempt successful? Yes   Call start time 1039   Call end time 1041   Discharge diagnosis Acute diverticulitis   Person spoke with today (if not patient) and relationship Patient   Meds reviewed with patient/caregiver? Yes   Does the patient have all medications ordered at discharge? Yes   Prescription comments Patient has all medications, had a questions what time levaquin was given yesterday in the hospital- When looking in epic around 1500- 3pm   Is the patient taking all medications as directed (includes completed medication regime)? Yes   Comments Patient has hospital fu on 04/05 @ 11a   Does the patient have an appointment with their PCP within 7 days of discharge? Yes   Has home health visited the patient within 72 hours of discharge? N/A   Psychosocial issues? No   Did the patient receive a copy of their discharge instructions? Yes   Nursing interventions Reviewed instructions with patient   What is the patient's perception of their health status since discharge? Improving   Is the patient/caregiver able to teach back signs and symptoms related to disease process for when to call PCP? Yes   Is the patient/caregiver able to teach back signs and symptoms related to disease process for when to call 911? Yes   Is the patient/caregiver able to teach back the hierarchy of who to call/visit for symptoms/problems? PCP, Specialist, Home health nurse, Urgent Care, ED, 911 Yes   TCM call completed? Yes   Wrap up additional comments Patient is doing well, Discussed when levaquin was last given and she is to call Dr. Schmitt office to schedule a fu in 1 month. no other concerns or questions noted.   Call end time 1041   Would this patient benefit from a Referral to [facility] Social Work? No   Is the patient interested in  additional calls from an ambulatory ?  NOTE:  applies to high risk patients requiring additional follow-up. No          Katherine Bell RN    4/3/2023, 10:42 EDT       Yes

## 2025-01-29 DIAGNOSIS — K57.92 DIVERTICULITIS: Primary | ICD-10-CM

## 2025-01-29 RX ORDER — METRONIDAZOLE 500 MG/1
500 TABLET ORAL 2 TIMES DAILY
Qty: 14 TABLET | Refills: 0 | Status: SHIPPED | OUTPATIENT
Start: 2025-01-29 | End: 2025-02-05

## 2025-01-29 RX ORDER — CIPROFLOXACIN 500 MG/1
500 TABLET, FILM COATED ORAL 2 TIMES DAILY
Qty: 14 TABLET | Refills: 0 | Status: SHIPPED | OUTPATIENT
Start: 2025-01-29 | End: 2025-02-05

## 2025-02-17 DIAGNOSIS — I10 ESSENTIAL HYPERTENSION: ICD-10-CM

## 2025-02-17 RX ORDER — METOPROLOL SUCCINATE 25 MG/1
25 TABLET, EXTENDED RELEASE ORAL EVERY MORNING
Qty: 90 TABLET | Refills: 3 | Status: SHIPPED | OUTPATIENT
Start: 2025-02-17

## 2025-02-17 NOTE — TELEPHONE ENCOUNTER
Rx Refill Note  Requested Prescriptions     Pending Prescriptions Disp Refills    metoprolol succinate XL (TOPROL-XL) 25 MG 24 hr tablet 90 tablet 3     Sig: Take 1 tablet by mouth Every Morning.      Last office visit with prescribing clinician: 9/12/2024   Last telemedicine visit with prescribing clinician: Visit date not found   Next office visit with prescribing clinician: 3/13/2025                         Would you like a call back once the refill request has been completed: [] Yes [] No    If the office needs to give you a call back, can they leave a voicemail: [] Yes [] No    Kristie Fonseca MA  02/17/25, 08:52 EST

## 2025-03-05 ENCOUNTER — OFFICE (AMBULATORY)
Dept: URBAN - METROPOLITAN AREA CLINIC 64 | Facility: CLINIC | Age: 55
End: 2025-03-05

## 2025-03-05 VITALS
SYSTOLIC BLOOD PRESSURE: 121 MMHG | DIASTOLIC BLOOD PRESSURE: 85 MMHG | HEIGHT: 62 IN | HEART RATE: 80 BPM | WEIGHT: 194 LBS

## 2025-03-05 DIAGNOSIS — R10.32 LEFT LOWER QUADRANT PAIN: ICD-10-CM

## 2025-03-05 DIAGNOSIS — K57.90 DIVERTICULOSIS OF INTESTINE, PART UNSPECIFIED, WITHOUT PERFO: ICD-10-CM

## 2025-03-05 PROCEDURE — 99214 OFFICE O/P EST MOD 30 MIN: CPT

## 2025-03-05 RX ORDER — HYOSCYAMINE SULFATE 0.12 MG/1
0.5 TABLET ORAL; SUBLINGUAL
Qty: 60 | Refills: 5 | Status: ACTIVE
Start: 2025-03-05

## 2025-03-05 NOTE — PROGRESS NOTES
Encounter Date:03/13/2025    Last seen 9/12/2024      Patient ID: Itzel Hogan is a 54 y.o. female.      Chief Complaint:     Palpitations  Hypertension  Dyslipidemia  Shortness of breath    History of present illness  Since I have last seen, the patient has been without any chest discomfort ,shortness of breath, palpitations, dizziness or syncope.  Denies having any headache ,abdominal pain ,nausea, vomiting , diarrhea constipation, loss of weight or loss of appetite.  Denies having any excessive bruising ,hematuria or blood in the stool.    Review of all systems negative except as indicated.    Reviewed ROS.    Assessment and Plan      ]]]]]]]]]]]]]]]]]  History  ========  -Palpitations-exertional shortness of breath-improved     Echocardiogram-normal 2/11/2020  Stress Cardiolite test-normal 2/11/2020     -Hypertension dyslipidemia migraine headaches recent cholesterol 233  (March 2020)     -Status post tonsillectomy sinus surgery carpal tunnel release     -Family history of coronary artery disease.     -Non-smoker     -Allergic to penicillin cephalexin prochlorperazine  ===========  Plan  ========  Palpitations and shortness of breath-improved.  EKG showed sinus rhythm without any ischemic changes-1/3/2024     Hypertension-well-controlled  133/83.  Continue metoprolol XL 25 mg a day and Norvasc 5 mg a day.  Patient is off hydrochlorothiazide.    Dyslipidemia-diet controlled    Medications were reviewed and updated.    Patient is not having any angina pectoris or congestive heart failure.    Follow-up in the office in 6 months.    Further plan will depend on patient's progress    Reviewed and updated 3/13/2025.    [[[[[[[[[[[[[[[[[              Diagnosis Plan   1. Essential hypertension        2. Palpitations        3. Dyslipidemia        4. SOB (shortness of breath)        LAB RESULTS (LAST 7 DAYS)    CBC        BMP        CMP         BNP        TROPONIN        CoAg        Creatinine Clearance  CrCl  cannot be calculated (Patient's most recent lab result is older than the maximum 30 days allowed.).    ABG        Radiology  No radiology results for the last day                The following portions of the patient's history were reviewed and updated as appropriate: allergies, current medications, past family history, past medical history, past social history, past surgical history, and problem list.    Review of Systems   Constitutional: Negative for malaise/fatigue.   Cardiovascular:  Negative for chest pain, dyspnea on exertion, leg swelling and palpitations.   Respiratory:  Negative for cough and shortness of breath.    Gastrointestinal:  Negative for abdominal pain, nausea and vomiting.   Neurological:  Negative for dizziness, focal weakness, headaches, light-headedness and numbness.   All other systems reviewed and are negative.      Current Outpatient Medications:     amLODIPine (NORVASC) 5 MG tablet, Take 1 tablet by mouth Daily., Disp: 90 tablet, Rfl: 1    atorvastatin (LIPITOR) 10 MG tablet, Take 1 tablet by mouth Daily., Disp: 90 tablet, Rfl: 3    cetirizine (zyrTEC) 10 MG tablet, Take 1 tablet by mouth Daily., Disp: , Rfl:     EPINEPHrine (EPIPEN) 0.3 MG/0.3ML solution auto-injector injection, See Admin Instructions. for allergic reaction, Disp: , Rfl:     fluticasone (Flonase Allergy Relief) 50 MCG/ACT nasal spray, Administer 1 spray into the nostril(s) as directed by provider Daily., Disp: , Rfl:     methylPREDNISolone (MEDROL) 4 MG dose pack, Take as directed on package instructions., Disp: 21 tablet, Rfl: 0    metoprolol succinate XL (TOPROL-XL) 25 MG 24 hr tablet, Take 1 tablet by mouth Every Morning., Disp: 90 tablet, Rfl: 3    omeprazole OTC (PriLOSEC OTC) 20 MG EC tablet, Take 1 tablet by mouth Daily., Disp: 90 tablet, Rfl: 1    Probiotic Product (Probiotic Advanced) capsule, Take 1 capsule by mouth Daily. Specific for vaginal health, Disp: , Rfl:     Allergies   Allergen Reactions     Penicillins Hives    Prochlorperazine Dizziness    Stadol [Butorphanol] Dizziness    Lisinopril Swelling    Meat Extract Other (See Comments)     Alpha-gal positive    Zinc Gelatin [Zinc] Other (See Comments)     Alpha-gal positive    Cephalexin Hives       Family History   Problem Relation Age of Onset    Stroke Mother     Heart failure Mother     Cancer Mother     Asthma Mother     Hyperlipidemia Mother     Hypertension Mother     Skin cancer Father     Anemia Father     Asthma Sister     Heart attack Maternal Grandmother     Heart attack Maternal Grandfather        Past Surgical History:   Procedure Laterality Date    BUNIONECTOMY Right 04/05/2024    Procedure: BUNIONECTOMY WITH DISTAL METATARSAL OSTEOTOMY AND INTERNAL FIXATION;  Surgeon: HERMES Corbin DPM;  Location: [facility] MAIN OR;  Service: Podiatry;  Laterality: Right;    BUNIONECTOMY  4/5/2024    CARPAL TUNNEL RELEASE Bilateral     COLONOSCOPY      ENDOSCOPY      FOOT SURGERY  4/5/24    HAMMER TOE REPAIR Right 04/05/2024    Procedure: HAMMER DIGIT CORRECTION TO SECOND RAY;  Surgeon: HERMES Corbin DPM;  Location: [facility] MAIN OR;  Service: Podiatry;  Laterality: Right;    METATARSAL OSTEOTOMY Right 04/05/2024    Procedure: WEIL METATARSAL OSTEOTOMY, PLANTAR PLATE REPAIR;  Surgeon: HERMES Corbin DPM;  Location: [facility] MAIN OR;  Service: Podiatry;  Laterality: Right;    SINUS SURGERY      TONSILLECTOMY      WISDOM TOOTH EXTRACTION         Past Medical History:   Diagnosis Date    Allergic     Allergic reaction to alpha-gal     Allergies     Anemia     B12 deficiency     Bunion     Difficulty walking     Diverticulitis     Diverticulosis April 1 , 2023    Umu archaamir     GERD (gastroesophageal reflux disease)     Hammer toe     Hyperlipidemia     Hypertension     Migraine     Seasonal allergies        Family History   Problem Relation Age of Onset    Stroke Mother     Heart failure Mother     Cancer Mother     Asthma Mother     Hyperlipidemia Mother      Hypertension Mother     Skin cancer Father     Anemia Father     Asthma Sister     Heart attack Maternal Grandmother     Heart attack Maternal Grandfather        Social History     Socioeconomic History    Marital status:    Tobacco Use    Smoking status: Never     Passive exposure: Never    Smokeless tobacco: Never   Vaping Use    Vaping status: Never Used   Substance and Sexual Activity    Alcohol use: Not Currently     Comment: rare    Drug use: Never    Sexual activity: Yes     Partners: Male     Birth control/protection: Condom           ECG 12 Lead    Date/Time: 3/13/2025 12:33 PM  Performed by: Oleg Flores MD    Authorized by: Oleg Flores MD  Comparison: compared with previous ECG   Similar to previous ECG  Comparison to previous ECG: Normal sinus rhythm normal ECG normal axis normal intervals no ectopy 72/min no significant change from previous EKG.        Objective:       Physical Exam    There were no vitals taken for this visit.  The patient is alert, oriented and in no distress.    Vital signs as noted above.     Head and neck revealed no carotid bruits or jugular venous distension.  No thyromegaly or lymphadenopathy is present.     Lungs clear.  No wheezing.  Breath sounds are normal bilaterally.     Heart normal first and second heart sounds.  No murmur..  No pericardial rub is present.  No gallop is present.     Abdomen soft and nontender.  No organomegaly is present.     Extremities revealed good peripheral pulses without any pedal edema.     Skin warm and dry.     Musculoskeletal system is grossly normal.     CNS grossly normal.    Reviewed and updated

## 2025-03-06 LAB
CBC WITH DIFFERENTIAL/PLATELET: BASO (ABSOLUTE): 0 X10E3/UL (ref 0–0.2)
CBC WITH DIFFERENTIAL/PLATELET: BASOS: 1 %
CBC WITH DIFFERENTIAL/PLATELET: EOS (ABSOLUTE): 0.1 X10E3/UL (ref 0–0.4)
CBC WITH DIFFERENTIAL/PLATELET: EOS: 2 %
CBC WITH DIFFERENTIAL/PLATELET: HEMATOCRIT: 40.6 % (ref 34–46.6)
CBC WITH DIFFERENTIAL/PLATELET: HEMATOLOGY COMMENTS: (no result)
CBC WITH DIFFERENTIAL/PLATELET: HEMOGLOBIN: 13.3 G/DL (ref 11.1–15.9)
CBC WITH DIFFERENTIAL/PLATELET: IMMATURE CELLS: (no result)
CBC WITH DIFFERENTIAL/PLATELET: IMMATURE GRANS (ABS): 0 X10E3/UL (ref 0–0.1)
CBC WITH DIFFERENTIAL/PLATELET: IMMATURE GRANULOCYTES: 1 %
CBC WITH DIFFERENTIAL/PLATELET: LYMPHS (ABSOLUTE): 2.1 X10E3/UL (ref 0.7–3.1)
CBC WITH DIFFERENTIAL/PLATELET: LYMPHS: 34 %
CBC WITH DIFFERENTIAL/PLATELET: MCH: 28.4 PG (ref 26.6–33)
CBC WITH DIFFERENTIAL/PLATELET: MCHC: 32.8 G/DL (ref 31.5–35.7)
CBC WITH DIFFERENTIAL/PLATELET: MCV: 87 FL (ref 79–97)
CBC WITH DIFFERENTIAL/PLATELET: MONOCYTES(ABSOLUTE): 0.4 X10E3/UL (ref 0.1–0.9)
CBC WITH DIFFERENTIAL/PLATELET: MONOCYTES: 6 %
CBC WITH DIFFERENTIAL/PLATELET: NEUTROPHILS (ABSOLUTE): 3.6 X10E3/UL (ref 1.4–7)
CBC WITH DIFFERENTIAL/PLATELET: NEUTROPHILS: 56 %
CBC WITH DIFFERENTIAL/PLATELET: NRBC: (no result)
CBC WITH DIFFERENTIAL/PLATELET: PLATELETS: 318 X10E3/UL (ref 150–450)
CBC WITH DIFFERENTIAL/PLATELET: RBC: 4.68 X10E6/UL (ref 3.77–5.28)
CBC WITH DIFFERENTIAL/PLATELET: RDW: 13.3 % (ref 11.7–15.4)
CBC WITH DIFFERENTIAL/PLATELET: WBC: 6.1 X10E3/UL (ref 3.4–10.8)

## 2025-03-12 DIAGNOSIS — I10 ESSENTIAL HYPERTENSION: ICD-10-CM

## 2025-03-12 RX ORDER — METOPROLOL SUCCINATE 25 MG/1
25 TABLET, EXTENDED RELEASE ORAL EVERY MORNING
Qty: 90 TABLET | Refills: 3 | Status: SHIPPED | OUTPATIENT
Start: 2025-03-12 | End: 2025-03-13 | Stop reason: SDUPTHER

## 2025-03-12 NOTE — TELEPHONE ENCOUNTER
Rx Refill Note  Requested Prescriptions     Pending Prescriptions Disp Refills    metoprolol succinate XL (TOPROL-XL) 25 MG 24 hr tablet [Pharmacy Med Name: METOPROLOL ER SUCCINATE 25MG TABS] 90 tablet 3     Sig: TAKE 1 TABLET BY MOUTH EVERY MORNING      Last office visit with prescribing clinician: 9/12/2024   Last telemedicine visit with prescribing clinician: Visit date not found   Next office visit with prescribing clinician: 3/13/2025                         Would you like a call back once the refill request has been completed: [] Yes [] No    If the office needs to give you a call back, can they leave a voicemail: [] Yes [] No    Kristie Fonseca MA  03/12/25, 08:24 EDT

## 2025-03-13 ENCOUNTER — OFFICE VISIT (OUTPATIENT)
Dept: CARDIOLOGY | Facility: CLINIC | Age: 55
End: 2025-03-13
Payer: COMMERCIAL

## 2025-03-13 VITALS
HEIGHT: 62 IN | BODY MASS INDEX: 35.33 KG/M2 | WEIGHT: 192 LBS | HEART RATE: 72 BPM | SYSTOLIC BLOOD PRESSURE: 133 MMHG | DIASTOLIC BLOOD PRESSURE: 83 MMHG | OXYGEN SATURATION: 98 %

## 2025-03-13 DIAGNOSIS — R06.02 SOB (SHORTNESS OF BREATH): ICD-10-CM

## 2025-03-13 DIAGNOSIS — I10 ESSENTIAL HYPERTENSION: Primary | ICD-10-CM

## 2025-03-13 DIAGNOSIS — R00.2 PALPITATIONS: ICD-10-CM

## 2025-03-13 DIAGNOSIS — E78.5 DYSLIPIDEMIA: ICD-10-CM

## 2025-03-13 PROCEDURE — 93000 ELECTROCARDIOGRAM COMPLETE: CPT | Performed by: INTERNAL MEDICINE

## 2025-03-13 PROCEDURE — 99214 OFFICE O/P EST MOD 30 MIN: CPT | Performed by: INTERNAL MEDICINE

## 2025-03-13 RX ORDER — METOPROLOL SUCCINATE 25 MG/1
25 TABLET, EXTENDED RELEASE ORAL EVERY MORNING
Qty: 90 TABLET | Refills: 3 | Status: SHIPPED | OUTPATIENT
Start: 2025-03-13

## 2025-03-13 RX ORDER — AMLODIPINE BESYLATE 5 MG/1
5 TABLET ORAL DAILY
Qty: 90 TABLET | Refills: 3 | Status: SHIPPED | OUTPATIENT
Start: 2025-03-13

## 2025-03-13 RX ORDER — HYOSCYAMINE SULFATE 0.12 MG/1
60 TABLET SUBLINGUAL
COMMUNITY
Start: 2025-03-05

## 2025-03-31 NOTE — PROGRESS NOTES
Subjective   Itzel Hogan is a 54 y.o. female.       HPI   Pt is here today for 6 month follow up.   1)  HTN - currently on amlodipine 5 mg daily; metoprolol XL 25 mg daily. Denies any CP; palpitations; SOA; dizziness; headache; trouble with vision.   2) Hyperlipidemia - currently on atorvastatin 10 mg daily.    3) GERD - currently on omeprazole 20 mg daily.   4) Saw GI yesterday to f/u on abdominal pain/lower spasm; symptoms come and go.  GI has done imaging that she reports was normal.  They gave her hyoscyamine but she says this has not helped.      The following portions of the patient's history were reviewed and updated as appropriate: allergies, current medications, past family history, past medical history, past social history, past surgical history, and problem list.    Review of Systems   Constitutional:  Negative for appetite change, chills, fatigue and fever.   Respiratory:  Negative for cough, shortness of breath and wheezing.    Cardiovascular:  Negative for chest pain and palpitations.   Gastrointestinal:  Positive for abdominal pain. Negative for blood in stool, constipation, diarrhea, nausea, vomiting and GERD.   Genitourinary:  Negative for dysuria, frequency, hematuria and urgency.   Neurological:  Negative for dizziness, weakness, light-headedness and headache.   Psychiatric/Behavioral:  Negative for depressed mood. The patient is not nervous/anxious.        Objective   Physical Exam  Vitals reviewed.   Constitutional:       General: She is not in acute distress.     Appearance: Normal appearance.   Cardiovascular:      Rate and Rhythm: Normal rate and regular rhythm.      Pulses: Normal pulses.      Heart sounds: Normal heart sounds. No murmur heard.  Pulmonary:      Effort: Pulmonary effort is normal. No respiratory distress.      Breath sounds: Normal breath sounds. No wheezing, rhonchi or rales.   Chest:      Chest wall: No tenderness.   Abdominal:      Tenderness: There is no right CVA  tenderness or left CVA tenderness.   Skin:     General: Skin is warm and dry.   Neurological:      General: No focal deficit present.      Mental Status: She is alert and oriented to person, place, and time.   Psychiatric:         Mood and Affect: Mood normal.                  Procedures   Assessment & Plan   Diagnoses and all orders for this visit:    1. Essential hypertension (Primary)  Comments:  Stable.   Cont. current medication.   Labs ordered.   RTO in 6 mo.  Orders:  -     Comprehensive metabolic panel; Future    2. Mixed hyperlipidemia  Comments:  Stable.   Cont. current medication.   Labs ordered.   RTO in 6 mo.  Orders:  -     Lipid panel; Future    3. Gastroesophageal reflux disease, unspecified whether esophagitis present  Comments:  Stable.   Cont. current medication.   Labs ordered.   RTO in 6 mo.    4. Abdominal bloating with cramps  Comments:  Given dicyclomine 20 mg tabs qid.  Orders:  -     Discontinue: dicyclomine (BENTYL) 10 MG capsule; Take 1 capsule by mouth 4 (Four) Times a Day Before Meals & at Bedtime.  Dispense: 120 capsule; Refill: 0  -     dicyclomine (BENTYL) 20 MG tablet; Take 1 tablet by mouth Every 6 (Six) Hours.  Dispense: 120 tablet; Refill: 0

## 2025-04-02 ENCOUNTER — OFFICE VISIT (OUTPATIENT)
Dept: FAMILY MEDICINE CLINIC | Facility: CLINIC | Age: 55
End: 2025-04-02
Payer: COMMERCIAL

## 2025-04-02 ENCOUNTER — LAB (OUTPATIENT)
Dept: FAMILY MEDICINE CLINIC | Facility: CLINIC | Age: 55
End: 2025-04-02
Payer: COMMERCIAL

## 2025-04-02 VITALS
SYSTOLIC BLOOD PRESSURE: 129 MMHG | OXYGEN SATURATION: 100 % | WEIGHT: 193 LBS | BODY MASS INDEX: 35.51 KG/M2 | HEART RATE: 88 BPM | DIASTOLIC BLOOD PRESSURE: 97 MMHG | HEIGHT: 62 IN

## 2025-04-02 DIAGNOSIS — R10.9 ABDOMINAL BLOATING WITH CRAMPS: ICD-10-CM

## 2025-04-02 DIAGNOSIS — I10 ESSENTIAL HYPERTENSION: Primary | ICD-10-CM

## 2025-04-02 DIAGNOSIS — R14.0 ABDOMINAL BLOATING WITH CRAMPS: ICD-10-CM

## 2025-04-02 DIAGNOSIS — E78.2 MIXED HYPERLIPIDEMIA: ICD-10-CM

## 2025-04-02 DIAGNOSIS — K21.9 GASTROESOPHAGEAL REFLUX DISEASE, UNSPECIFIED WHETHER ESOPHAGITIS PRESENT: ICD-10-CM

## 2025-04-02 DIAGNOSIS — I10 ESSENTIAL HYPERTENSION: ICD-10-CM

## 2025-04-02 LAB
ALBUMIN SERPL-MCNC: 4.1 G/DL (ref 3.5–5.2)
ALBUMIN/GLOB SERPL: 1.2 G/DL
ALP SERPL-CCNC: 118 U/L (ref 39–117)
ALT SERPL W P-5'-P-CCNC: 32 U/L (ref 1–33)
ANION GAP SERPL CALCULATED.3IONS-SCNC: 7.5 MMOL/L (ref 5–15)
AST SERPL-CCNC: 24 U/L (ref 1–32)
BILIRUB SERPL-MCNC: 0.2 MG/DL (ref 0–1.2)
BUN SERPL-MCNC: 15 MG/DL (ref 6–20)
BUN/CREAT SERPL: 17 (ref 7–25)
CALCIUM SPEC-SCNC: 9.4 MG/DL (ref 8.6–10.5)
CHLORIDE SERPL-SCNC: 104 MMOL/L (ref 98–107)
CHOLEST SERPL-MCNC: 155 MG/DL (ref 0–200)
CO2 SERPL-SCNC: 28.5 MMOL/L (ref 22–29)
CREAT SERPL-MCNC: 0.88 MG/DL (ref 0.57–1)
EGFRCR SERPLBLD CKD-EPI 2021: 78.7 ML/MIN/1.73
GLOBULIN UR ELPH-MCNC: 3.4 GM/DL
GLUCOSE SERPL-MCNC: 94 MG/DL (ref 65–99)
HDLC SERPL-MCNC: 49 MG/DL (ref 40–60)
LDLC SERPL CALC-MCNC: 85 MG/DL (ref 0–100)
LDLC/HDLC SERPL: 1.69 {RATIO}
POTASSIUM SERPL-SCNC: 3.9 MMOL/L (ref 3.5–5.2)
PROT SERPL-MCNC: 7.5 G/DL (ref 6–8.5)
SODIUM SERPL-SCNC: 140 MMOL/L (ref 136–145)
TRIGL SERPL-MCNC: 116 MG/DL (ref 0–150)
VLDLC SERPL-MCNC: 21 MG/DL (ref 5–40)

## 2025-04-02 PROCEDURE — 80061 LIPID PANEL: CPT | Performed by: NURSE PRACTITIONER

## 2025-04-02 PROCEDURE — 36415 COLL VENOUS BLD VENIPUNCTURE: CPT

## 2025-04-02 PROCEDURE — 80053 COMPREHEN METABOLIC PANEL: CPT | Performed by: NURSE PRACTITIONER

## 2025-04-02 PROCEDURE — 99214 OFFICE O/P EST MOD 30 MIN: CPT | Performed by: NURSE PRACTITIONER

## 2025-04-02 RX ORDER — DICYCLOMINE HYDROCHLORIDE 10 MG/1
10 CAPSULE ORAL
Qty: 120 CAPSULE | Refills: 0 | Status: SHIPPED | OUTPATIENT
Start: 2025-04-02 | End: 2025-04-02

## 2025-04-02 RX ORDER — DICYCLOMINE HCL 20 MG
20 TABLET ORAL EVERY 6 HOURS
Qty: 120 TABLET | Refills: 0 | Status: SHIPPED | OUTPATIENT
Start: 2025-04-02

## 2025-04-16 DIAGNOSIS — R73.03 PREDIABETES: Primary | ICD-10-CM

## 2025-04-16 DIAGNOSIS — Z91.014 ALPHA-GAL SYNDROME: ICD-10-CM

## 2025-05-04 ENCOUNTER — TELEPHONE (OUTPATIENT)
Dept: FAMILY MEDICINE CLINIC | Facility: CLINIC | Age: 55
End: 2025-05-04
Payer: COMMERCIAL

## 2025-05-04 DIAGNOSIS — R11.2 NAUSEA AND VOMITING, UNSPECIFIED VOMITING TYPE: Primary | ICD-10-CM

## 2025-05-04 RX ORDER — PROMETHAZINE HYDROCHLORIDE 25 MG/1
25 TABLET ORAL EVERY 8 HOURS PRN
Qty: 20 TABLET | Refills: 0 | Status: SHIPPED | OUTPATIENT
Start: 2025-05-04

## 2025-05-05 NOTE — TELEPHONE ENCOUNTER
ON CALL MESSAGE     Pt  called with concern that patient has had a stomach bug this weekend and has N/V/D.  No fevers.  Has Zofran that she has been taking but it is not working well.  Able to tolerate a little fluid.      Discussed taking her to [facility] in [place] this evening since pharmacy is closed.      Will send in phenergan for her to have tomorrow.  Encouraged fluids and electrolytes, such as Gatorade or Pedialyte.

## 2025-05-14 ENCOUNTER — HOSPITAL ENCOUNTER (OUTPATIENT)
Dept: DIABETES SERVICES | Facility: HOSPITAL | Age: 55
Discharge: HOME | End: 2025-05-14
Admitting: NURSE PRACTITIONER
Payer: COMMERCIAL

## 2025-05-14 PROCEDURE — 97802 MEDICAL NUTRITION INDIV IN: CPT

## 2025-05-14 NOTE — PROGRESS NOTES
Diabetes Education    Patient Name:  Itzel Hogan  YOB: 1970  MRN: [id number]  Admit Date:  5/14/2025    Mrs. Hogan met with TONY OKEEFE for MNT for prediabetes. A comprehensive assessment/training record has been sent to medical records (see media tab) to associate with this encounter.      Hemoglobin A1c = 6.2%      We discussed meal planning - she was advised to follow ADA plate method to plan meals. We discussed the importance of eating regular meals. We reviewed how to read food labels. We reviewed consuming complex carbohydrates instead of simple carbohydrates. We discussed increased fiber and the impact on blood glucose. We reviewed carbohydrate portion sizes - we discussed importance of “balance meals/snacks”.  We discussed the importance of exercise for diabetes prevention. Patient verbalized understanding of all information reviewed at today's visit.       Mrs. Hogan has been encouraged to call our office with questions or additional education needs. Please place referral for additional services or follow-up should need arise.     Thank you for the referral     Marimar Chaparro RD, AZRA, SARY    Electronically signed by:  Marimar Chaparro RD  05/14/25 12:55 EDT

## 2025-05-19 DIAGNOSIS — E78.2 MIXED HYPERLIPIDEMIA: ICD-10-CM

## 2025-05-19 DIAGNOSIS — I10 ESSENTIAL HYPERTENSION: ICD-10-CM

## 2025-05-19 RX ORDER — ATORVASTATIN CALCIUM 10 MG/1
10 TABLET, FILM COATED ORAL DAILY
Qty: 90 TABLET | Refills: 3 | Status: SHIPPED | OUTPATIENT
Start: 2025-05-19

## 2025-05-19 RX ORDER — AMLODIPINE BESYLATE 5 MG/1
5 TABLET ORAL DAILY
Qty: 90 TABLET | Refills: 3 | Status: SHIPPED | OUTPATIENT
Start: 2025-05-19

## 2025-05-19 NOTE — TELEPHONE ENCOUNTER
Rx Refill Note  Requested Prescriptions     Signed Prescriptions Disp Refills    amLODIPine (NORVASC) 5 MG tablet 90 tablet 3     Sig: Take 1 tablet by mouth Daily.     Authorizing Provider: TERESA ZACARIAS     Ordering User: NUZHAT MELO      Last office visit with prescribing clinician: 3/13/2025   Last telemedicine visit with prescribing clinician: Visit date not found   Next office visit with prescribing clinician: 9/17/2025                         Would you like a call back once the refill request has been completed: [] Yes [] No    If the office needs to give you a call back, can they leave a voicemail: [] Yes [] No    Nuzhat Melo MA  05/19/25, 09:37 EDT

## 2025-06-09 ENCOUNTER — OFFICE (AMBULATORY)
Dept: URBAN - METROPOLITAN AREA CLINIC 64 | Facility: CLINIC | Age: 55
End: 2025-06-09
Payer: COMMERCIAL

## 2025-06-09 VITALS
WEIGHT: 173 LBS | SYSTOLIC BLOOD PRESSURE: 128 MMHG | HEART RATE: 77 BPM | HEIGHT: 62 IN | DIASTOLIC BLOOD PRESSURE: 86 MMHG

## 2025-06-09 DIAGNOSIS — K21.9 GASTRO-ESOPHAGEAL REFLUX DISEASE WITHOUT ESOPHAGITIS: ICD-10-CM

## 2025-06-09 DIAGNOSIS — K59.00 CONSTIPATION, UNSPECIFIED: ICD-10-CM

## 2025-06-09 PROCEDURE — 99213 OFFICE O/P EST LOW 20 MIN: CPT

## 2025-07-14 ENCOUNTER — OFFICE VISIT (OUTPATIENT)
Age: 55
End: 2025-07-14
Payer: COMMERCIAL

## 2025-07-14 VITALS — HEART RATE: 82 BPM | WEIGHT: 187 LBS | HEIGHT: 62 IN | BODY MASS INDEX: 34.41 KG/M2 | RESPIRATION RATE: 20 BRPM

## 2025-07-14 DIAGNOSIS — M77.8 CAPSULITIS OF RIGHT FOOT: ICD-10-CM

## 2025-07-14 DIAGNOSIS — L90.5 SCAR TISSUE: ICD-10-CM

## 2025-07-14 DIAGNOSIS — S99.921S PLANTAR PLATE INJURY, RIGHT, SEQUELA: ICD-10-CM

## 2025-07-14 DIAGNOSIS — M79.671 RIGHT FOOT PAIN: Primary | ICD-10-CM

## 2025-07-14 DIAGNOSIS — M77.41 METATARSALGIA, RIGHT FOOT: ICD-10-CM

## 2025-07-14 RX ORDER — TRIAMCINOLONE ACETONIDE 40 MG/ML
20 INJECTION, SUSPENSION INTRA-ARTICULAR; INTRAMUSCULAR ONCE
Status: COMPLETED | OUTPATIENT
Start: 2025-07-14 | End: 2025-07-14

## 2025-07-14 RX ADMIN — TRIAMCINOLONE ACETONIDE 20 MG: 40 INJECTION, SUSPENSION INTRA-ARTICULAR; INTRAMUSCULAR at 13:20

## 2025-07-15 NOTE — PROGRESS NOTES
07/14/2025  Foot and Ankle Surgery - Established Patient/Follow-up  Provider: Dr. Leon Corbin DPM  Location: [facility]    Subjective:  Itzel Hogan is a 54 y.o. female.     Chief Complaint   Patient presents with    Right Foot - Follow-up, Pain     2nd toe pain    Follow-up     PCP: Keily Rodas APRN  Last PCP Visit:   4/2/25       History of Present Illness  The patient presents for evaluation of foot pain.    She reports persistent discomfort in her feet, describing it as a sensation akin to having an object lodged between them. The pain is characterized by a throbbing sensation, which she experienced last night and suspects may be weather-related. She notes that her activity level increases during the summer months, particularly due to gardening, which involves prolonged standing. She also mentions that her other foot is beginning to exhibit similar symptoms, although she has not sought treatment for it. She expresses concern about potential damage to both feet and is hesitant about surgical intervention. She recalls receiving a steroid injection in October 2024, which provided some relief. She is currently not using any inserts.      Allergies   Allergen Reactions    Penicillins Hives    Prochlorperazine Dizziness    Stadol [Butorphanol] Dizziness    Lisinopril Swelling    Meat Extract Other (See Comments)     Alpha-gal positive    Zinc Gelatin [Zinc] Other (See Comments)     Alpha-gal positive    Cephalexin Hives       Current Outpatient Medications on File Prior to Visit   Medication Sig Dispense Refill    amLODIPine (NORVASC) 5 MG tablet Take 1 tablet by mouth Daily. 90 tablet 3    atorvastatin (LIPITOR) 10 MG tablet Take 1 tablet by mouth Daily. 90 tablet 3    cetirizine (zyrTEC) 10 MG tablet Take 1 tablet by mouth Daily.      dicyclomine (BENTYL) 20 MG tablet Take 1 tablet by mouth Every 6 (Six) Hours. 120 tablet 0    EPINEPHrine (EPIPEN) 0.3 MG/0.3ML solution auto-injector injection  "See Admin Instructions. for allergic reaction      fluticasone (Flonase Allergy Relief) 50 MCG/ACT nasal spray Administer 1 spray into the nostril(s) as directed by provider Daily.      metoprolol succinate XL (TOPROL-XL) 25 MG 24 hr tablet Take 1 tablet by mouth Every Morning. 90 tablet 3    omeprazole OTC (PriLOSEC OTC) 20 MG EC tablet Take 1 tablet by mouth Daily. 90 tablet 1    Probiotic Product (Probiotic Advanced) capsule Take 1 capsule by mouth Daily. Specific for vaginal health      promethazine (PHENERGAN) 25 MG tablet Take 1 tablet by mouth Every 8 (Eight) Hours As Needed for Nausea or Vomiting. 20 tablet 0     No current facility-administered medications on file prior to visit.       Objective   Pulse 82   Resp 20   Ht 157.5 cm (62\")   Wt 84.8 kg (187 lb)   BMI 34.20 kg/m²     Foot/Ankle Exam  Physical Exam  GENERAL  Orientation:  AAOx3  Affect:  appropriate     VASCULAR      Right Foot Vascularity   Normal vascular exam    Dorsalis pedis:  2+  Posterior tibial:  2+  Skin temperature:  warm  Edema grading:  None  CFT:  < 3 seconds  Pedal hair growth:  Present  Varicosities:  none      Left Foot Vascularity   Normal vascular exam    Dorsalis pedis:  2+  Posterior tibial:  2+  Skin temperature:  warm  Edema grading:  None  CFT:  < 3 seconds  Pedal hair growth:  Present  Varicosities:  none     NEUROLOGIC      Right Foot Neurologic   Light touch sensation: normal  Hot/Cold sensation: normal  Achilles reflex:  2+      Left Foot Neurologic   Light touch sensation: normal  Hot/Cold sensation:  normal  Achilles reflex:  2+     MUSCULOSKELETAL      Right Foot Musculoskeletal   Arch:  Normal      Left Foot Musculoskeletal   Arch:  Normal     MUSCLE STRENGTH      Right Foot Muscle Strength   Normal strength    Foot dorsiflexion:  5  Foot plantar flexion:  5  Foot inversion:  5  Foot eversion:  5      Left Foot Muscle Strength   Normal strength    Foot dorsiflexion:  5  Foot plantar flexion:  5  Foot inversion:  " 5  Foot eversion:  5     DERMATOLOGIC       Right Foot Dermatologic   Skin  Right foot skin is intact.       Left Foot Dermatologic   Skin  Left foot skin is intact.      TESTS      Right Foot Tests   Anterior drawer: negative  Varus tilt: negative      Left Foot Tests   Anterior drawer: negative  Varus tilt: negative     Right foot additional comments: 04/27/2023: Moderate equinus contracture with knee extended and flexed. Discomfort with palpation involving the plantar aspect of the second metatarsophalangeal joint region. Mild hammer digital deformity and mild bunion deformity with dorsal medial bony prominence.     06/01/2023  Continued discomfort involving the 2nd metatarsophalangeal joint of the right foot. Calf discomfort and significant equinus contracture with knee extended and flexed.     07/13/2023  Significant discomfort with palpation involving the 2nd metatarsophalangeal joint region. Mild progressive hammertoe deformity involving the right 2nd digit.     08/10/2023  Less discomfort with palpation involving the second metatarsophalangeal joint region.     12/07/2023  Continued discomfort with palpation involving the plantar aspect of the second metatarsophalangeal joint region. No significant swelling or inflammation.     02/12/2024  Continued discomfort involving the 2nd metatarsophalangeal joint region.     04/18/2024: Two incision sites are dry and stable with intact sutures in the musculoskeletal system. No evidence of dehiscence or infection. Mild swelling to the foot. No significant swelling. Rectus alignment of the digits is good.     5/2/2024: Incisions are well-healed.  Continued swelling involving the forefoot.  Moderate discomfort with palpation and soft tissue rigidity.  Rectus alignment of the digits.     05/30/2024: Continued improvement with soft tissue rigidity and mild to moderate swelling involving the forefoot in the musculoskeletal system. No significant discomfort with palpation.      7/10/24: Less swelling involving the forefoot.  Continued soft tissue rigidity.  Rectus alignment of the digits.  Mild dorsiflexion noted to the second metatarsal phalangeal joint region.     11/21/24: Significant improvement as compared to previous assessment but continued limitation involving the second metatarsal phalangeal joint region.  Scar tissue formation noted to the dorsal medial aspect of the joint region.  Discomfort with palpation.  Rectus alignment of the digits.  No significant swelling.  Neurovascular status is grossly intact    7/14/25: Discomfort with palpation involving the second metatarsal phalangeal joint region of the right foot.  No progressive deformity or instability.  Pain isolated to the plantar and dorsal aspect of the joint.  Significant soft tissue rigidity consistent with scar tissue.  No other areas of concern      Results  Imaging  X-ray of the right foot shows good joint space, 3rd and 4th toes have the same orientation, and the big toe joint doesn't look remarkably different with maybe a little less joint space to that area.    Assessment & Plan   Diagnoses and all orders for this visit:    1. Right foot pain (Primary)  -     XR Foot 3+ View Right  -     triamcinolone acetonide (KENALOG-40) injection 20 mg    2. Capsulitis of right foot    3. Scar tissue    4. Metatarsalgia, right foot    5. Plantar plate injury, right, sequela      Assessment & Plan    The patient's foot pain is likely due to scar tissue formation and capsulitis, which is causing discomfort and stiffness. The joint space appears normal on x-ray, indicating that the issue is primarily with the soft tissue. The possibility of a PRP injection was discussed, but she opted for a steroid injection today. She was advised to continue using inserts and to engage in low-impact exercises and stretching. A left foot x-ray will be ordered at her next visit. A steroid injection was administered today. If the steroid  injections are not effective, PRP injections may be considered in the future.Reviewed proper basic stretching and manual therapy exercises along with appropriate shoes and activity.  Discussed proper use and/or avoidance of OTC anti-inflammatories.  Patient is to call with any additional issues or concerns.  Greater than 20 minutes was spent before, during, and after evaluation for patient care excluding procedure.    The encounter note is created with the use of AI technology.  I do apologize if there are typos and/or confusion within the note.  Please feel free to contact me or my office with any questions or concerns.     Metatarsophalangeal Joint injection: Second MTPJ, right foot    Consent and time out was performed before proceeding with the procedure.  The dorsal medial aspect of the second metatarsal phalangeal joint on the right foot was cleansed with alcohol.  The skin about the injection site was initially anesthetized with approximately 1mL of 1% lidocaine plain.  A Betadine prep was performed to the joint. Due to the arthritic nature of the joint, ultrasound guidance was used to locate the joint space and avoid bony prominences.  Using aseptic technique, a 1.5 mL solution containing 1 mL of 0.5% Marcaine and 0.5 mL of Kenalog was injected into the joint space.  Mild compression was applied to the injection site and a sterile bandage was placed.  The patient noted immediate pain relief and tolerated the injection well without complication.    Follow-up  A follow-up visit is scheduled in 3 months.                   Patient or patient representative verbalized consent for the use of Ambient Listening during the visit with  HERMES Corbin DPM for chart documentation. 7/15/2025  07:35 EDT    HERMES Corbin DPM

## (undated) DEVICE — PADDING,UNDERCAST,COTTON, 4"X4YD STERILE: Brand: MEDLINE

## (undated) DEVICE — DRP C/ARM MINI

## (undated) DEVICE — SLV SCD CALF HEMOFORCE DVT THERP REPROC MD

## (undated) DEVICE — SUT ETHLN 3/0 FS1 30IN 669H

## (undated) DEVICE — GAUZE,SPONGE,FLUFF,6"X6.75",STRL,5/TRAY: Brand: MEDLINE

## (undated) DEVICE — KT SURG TURNOVER 050

## (undated) DEVICE — DRILL, HAMMERTOE UNIVERSAL SET

## (undated) DEVICE — DISPOSABLE TOURNIQUET CUFF 18"X4", 1-LINE, RED, STERILE, 1EA/PK, 10PK/CS: Brand: ASP MEDICAL

## (undated) DEVICE — PK EXTREM 50

## (undated) DEVICE — SOLUTION,WATER,IRRIGATION,1000ML,STERILE: Brand: MEDLINE

## (undated) DEVICE — MICRO SAGITTAL BLADE (9.5 X 0.4 X 25.5MM)

## (undated) DEVICE — DRAPE,TOWEL,LARGE,INVISISHIELD: Brand: MEDLINE

## (undated) DEVICE — IMPLANTABLE DEVICE
Type: IMPLANTABLE DEVICE | Site: FOOT | Status: NON-FUNCTIONAL
Removed: 2024-04-05

## (undated) DEVICE — CANNULATED DRILL BIT: Brand: MINI

## (undated) DEVICE — GOWN,REINFORCE,POLY,SIRUS,BREATH SLV,XLG: Brand: MEDLINE

## (undated) DEVICE — SUT VIC 2/0 CT2 27IN J269H

## (undated) DEVICE — NON-THREADED KWIRE
Type: IMPLANTABLE DEVICE | Site: FOOT | Status: NON-FUNCTIONAL
Brand: MINI
Removed: 2024-04-05

## (undated) DEVICE — NDL HYPO PRECISIONGLIDE/REG 18G 1IN PNK

## (undated) DEVICE — UNDERGLV SURG BIOGEL INDICAT PI SZ8 BLU

## (undated) DEVICE — DRSNG GZ CURAD XEROFORM PETROLTM OVERWRP 1X8IN STRL

## (undated) DEVICE — ANTIBACTERIAL UNDYED BRAIDED (POLYGLACTIN 910), SYNTHETIC ABSORBABLE SUTURE: Brand: COATED VICRYL

## (undated) DEVICE — INTENDED FOR TISSUE SEPARATION, AND OTHER PROCEDURES THAT REQUIRE A SHARP SURGICAL BLADE TO PUNCTURE OR CUT.: Brand: BARD-PARKER ® CARBON RIB-BACK BLADES

## (undated) DEVICE — PENCL HND ROCKRSWTCH HOLSTR EZ CLEAN TP CRD 10FT

## (undated) DEVICE — VO DRILL: Brand: PRO-TOE

## (undated) DEVICE — GLV SURG BIOGEL M LTX PF 7 1/2

## (undated) DEVICE — C2 DRIVER W/ TROCAR: Brand: PRO-TOE

## (undated) DEVICE — SOL IRRIG NACL 1000ML

## (undated) DEVICE — DRAPE,U/ SHT,SPLIT,PLAS,STERIL: Brand: MEDLINE

## (undated) DEVICE — MICRO SAGITTAL SAW BLADE, 4.5 X 25.4 X 0.6MM 34 TPI: Brand: MICROAIRE®